# Patient Record
Sex: FEMALE | Race: WHITE | NOT HISPANIC OR LATINO | Employment: OTHER | URBAN - METROPOLITAN AREA
[De-identification: names, ages, dates, MRNs, and addresses within clinical notes are randomized per-mention and may not be internally consistent; named-entity substitution may affect disease eponyms.]

---

## 2017-02-20 RX ORDER — ROSUVASTATIN CALCIUM 20 MG/1
20 TABLET, COATED ORAL DAILY
Qty: 90 TABLET | Refills: 1 | Status: SHIPPED | OUTPATIENT
Start: 2017-02-20 | End: 2017-05-30 | Stop reason: SDUPTHER

## 2017-02-21 ENCOUNTER — ANESTHESIA (OUTPATIENT)
Dept: GASTROENTEROLOGY | Facility: HOSPITAL | Age: 77
End: 2017-02-21

## 2017-02-21 ENCOUNTER — ANESTHESIA EVENT (OUTPATIENT)
Dept: GASTROENTEROLOGY | Facility: HOSPITAL | Age: 77
End: 2017-02-21

## 2017-02-21 PROCEDURE — 25010000002 PROPOFOL 10 MG/ML EMULSION: Performed by: ANESTHESIOLOGY

## 2017-02-21 RX ORDER — PROPOFOL 10 MG/ML
VIAL (ML) INTRAVENOUS AS NEEDED
Status: DISCONTINUED | OUTPATIENT
Start: 2017-02-21 | End: 2017-02-21 | Stop reason: SURG

## 2017-02-21 RX ORDER — LIDOCAINE HYDROCHLORIDE 20 MG/ML
INJECTION, SOLUTION INFILTRATION; PERINEURAL AS NEEDED
Status: DISCONTINUED | OUTPATIENT
Start: 2017-02-21 | End: 2017-02-21 | Stop reason: SURG

## 2017-02-21 RX ADMIN — LIDOCAINE HYDROCHLORIDE 100 MG: 20 INJECTION, SOLUTION INFILTRATION; PERINEURAL at 11:29

## 2017-02-21 RX ADMIN — PROPOFOL 200 MG: 10 INJECTION, EMULSION INTRAVENOUS at 11:29

## 2017-02-21 NOTE — ANESTHESIA POSTPROCEDURE EVALUATION
Patient: Cesilia Lutz    Procedure Summary     Date Anesthesia Start Anesthesia Stop Room / Location    02/21/17 1121 1157  [facility]       Procedure Diagnosis Surgeon Provider    COLONOSCOPY TO TRANSVERSE COLON WITH COLD SNARE POLYPECTOMY (N/A ) Encounter for screening for malignant neoplasm of colon  (Encounter for screening for malignant neoplasm of colon [Z12.11]) MD Daysi Doshi MD          Anesthesia Type: MAC  Last vitals  /70 (02/21/17 1213)    Temp      Pulse 59 (02/21/17 1213)   Resp 12 (02/21/17 1213)    SpO2 94 % (02/21/17 1213)      Post Anesthesia Care and Evaluation    Patient location during evaluation: PACU  Patient participation: complete - patient participated  Level of consciousness: awake and alert  Pain score: 0  Pain management: adequate  Airway patency: patent  Anesthetic complications: No anesthetic complications  PONV Status: none  Cardiovascular status: acceptable  Respiratory status: acceptable  Hydration status: acceptable

## 2017-02-21 NOTE — ANESTHESIA PREPROCEDURE EVALUATION
Anesthesia Evaluation     Patient summary reviewed and Nursing notes reviewed   NPO Status: > 8 hours   Airway   Mallampati: II  TM distance: >3 FB  Neck ROM: limited  possible difficult intubation  Dental - normal exam     Pulmonary - normal exam   Cardiovascular - normal exam    Patient on routine beta blocker and Beta blocker not taken-may be given intraoperatively    (+) hypertension well controlled,       Neuro/Psych  GI/Hepatic/Renal/Endo      Musculoskeletal     Abdominal  - normal exam    Bowel sounds: normal.   Substance History      OB/GYN          Other                                    Anesthesia Plan    ASA 3     MAC     Anesthetic plan and risks discussed with patient.

## 2017-03-09 ENCOUNTER — OFFICE VISIT (OUTPATIENT)
Dept: INTERNAL MEDICINE | Facility: CLINIC | Age: 77
End: 2017-03-09

## 2017-03-09 VITALS
BODY MASS INDEX: 29.84 KG/M2 | TEMPERATURE: 97.3 F | RESPIRATION RATE: 16 BRPM | SYSTOLIC BLOOD PRESSURE: 120 MMHG | HEIGHT: 59 IN | WEIGHT: 148 LBS | DIASTOLIC BLOOD PRESSURE: 70 MMHG | HEART RATE: 68 BPM

## 2017-03-09 DIAGNOSIS — I49.3 PVC'S (PREMATURE VENTRICULAR CONTRACTIONS): ICD-10-CM

## 2017-03-09 DIAGNOSIS — I10 ESSENTIAL HYPERTENSION: Primary | ICD-10-CM

## 2017-03-09 DIAGNOSIS — E78.5 HYPERLIPIDEMIA, UNSPECIFIED HYPERLIPIDEMIA TYPE: ICD-10-CM

## 2017-03-09 DIAGNOSIS — R73.02 IMPAIRED GLUCOSE TOLERANCE: ICD-10-CM

## 2017-03-09 LAB
CHOLEST SERPL-MCNC: 185 MG/DL (ref 0–200)
HBA1C MFR BLD: 6 % (ref 4.8–5.6)
HDLC SERPL-MCNC: 48 MG/DL (ref 40–60)
LDLC SERPL CALC-MCNC: 114 MG/DL (ref 0–100)
TRIGL SERPL-MCNC: 117 MG/DL (ref 0–150)
VLDLC SERPL CALC-MCNC: 23.4 MG/DL (ref 5–40)

## 2017-03-09 PROCEDURE — 99214 OFFICE O/P EST MOD 30 MIN: CPT | Performed by: INTERNAL MEDICINE

## 2017-03-09 PROCEDURE — 93000 ELECTROCARDIOGRAM COMPLETE: CPT | Performed by: INTERNAL MEDICINE

## 2017-03-09 NOTE — PROGRESS NOTES
Subjective   Cesilia Lutz is a 76 y.o. female.     Chief Complaint   Patient presents with   • Hypertension   • Hyperlipidemia         HPI Comments: In for recheck of chronic IGT.    Hypertension   This is a chronic problem. The current episode started more than 1 year ago. The problem is unchanged. The problem is controlled. Pertinent negatives include no chest pain, headaches, orthopnea, palpitations, peripheral edema, PND or shortness of breath. There are no associated agents to hypertension. Past treatments include beta blockers and diuretics. The current treatment provides significant improvement. There are no compliance problems.  There is no history of angina, kidney disease, CAD/MI, CVA or heart failure.   Hyperlipidemia   This is a chronic problem. The current episode started more than 1 year ago. The problem is controlled. Recent lipid tests were reviewed and are normal. Factors aggravating her hyperlipidemia include thiazides and beta blockers. Pertinent negatives include no chest pain or shortness of breath. Current antihyperlipidemic treatment includes statins. The current treatment provides significant improvement of lipids. There are no compliance problems.  There are no known risk factors for coronary artery disease.        The following portions of the patient's history were reviewed and updated as appropriate: allergies, current medications, past social history and problem list.    Outpatient Prescriptions Marked as Taking for the 3/9/17 encounter (Office Visit) with Quinn Cabrera MD   Medication Sig Dispense Refill   • KLOR-CON 10 MEQ CR tablet TAKE 2 TABLETS DAILY 180 tablet 0   • metoprolol succinate XL (TOPROL-XL) 50 MG 24 hr tablet TAKE 1 TABLET DAILY 90 tablet 1   • rosuvastatin (CRESTOR) 20 MG tablet Take 1 tablet by mouth Daily. 90 tablet 1   • triamterene-hydrochlorothiazide (DYAZIDE) 37.5-25 MG per capsule TAKE 1 CAPSULE EVERY MORNING 90 capsule 0       Review of Systems    Constitutional: Negative for chills, fatigue and fever.   Respiratory: Negative for cough, shortness of breath and wheezing.    Cardiovascular: Negative for chest pain, palpitations, orthopnea, leg swelling and PND.   Gastrointestinal: Negative for abdominal pain, constipation, diarrhea, nausea and vomiting.   Neurological: Negative for headaches.       Objective   Vitals:    03/09/17 0919   BP: 120/70   Pulse: 68   Resp: 16   Temp: 97.3 °F (36.3 °C)      Last Weight    03/09/17 0919   Weight: 148 lb (67.1 kg)    [unfilled]  Body mass index is 29.89 kg/(m^2).      Physical Exam   Constitutional: She appears well-developed and well-nourished. No distress.   HENT:   Head: Normocephalic and atraumatic.   Neck: Carotid bruit is not present. No thyromegaly present.   Cardiovascular: Normal rate, normal heart sounds and intact distal pulses.  An irregular rhythm present. Frequent extrasystoles are present. Exam reveals no gallop.    No murmur heard.  Bigeminy on exam   Pulmonary/Chest: Effort normal and breath sounds normal. No respiratory distress. She has no wheezes. She has no rales.   Abdominal: Soft. Bowel sounds are normal. She exhibits mass. There is no tenderness. There is no guarding.       Large mass superior and to the left of the umbilicus that is firm and measures about 6-8 cm in diameter.  She states this was previously investigated and found to be a hernia.         Problem List Items Addressed This Visit        Cardiovascular and Mediastinum    Hypertension - Primary    Hyperlipidemia    Relevant Orders    Lipid Panel       Endocrine    Impaired glucose tolerance    Relevant Orders    Hemoglobin A1c      Other Visit Diagnoses     PVC's (premature ventricular contractions)        Relevant Orders    ECG 12 Lead        Assessment/Plan   In for recheck of hypertension, hyperlipidemia, and IGT.  Annual lab work done May 2016.  Gets glucose, A1c, and lipids every 4 months.  Feels great.  Tolerating  medications well.  Very active.  Walks regularly.  Getting ready to retire for the second time from her job at the [employer].  No changes are made today.  She is in bigeminy today.  We will check an EKG.      ECG 12 Lead  Date/Time: 3/9/2017 1:10 PM  Performed by: FRANCO SILVA  Authorized by: FRANCO SILVA   Comparison: compared with previous ECG from 10/23/2012  Similar to previous ECG  Rhythm: sinus rhythm  Rate: normal  Conduction: conduction normal  ST Segments: ST segments normal  T Waves: T waves normal  QRS axis: normal  Other: no other findings  Clinical impression: normal ECG  Comments: Normal EKG.  Unchanged from prior EKG dated 10/23/12                   Dragon disclaimer:   Much of this encounter note is an electronic transcription/translation of spoken language to printed text. The electronic translation of spoken language may permit erroneous, or at times, nonsensical words or phrases to be inadvertently transcribed; Although I have reviewed the note for such errors, some may still exist.

## 2017-03-21 RX ORDER — POTASSIUM CHLORIDE 750 MG/1
TABLET, EXTENDED RELEASE ORAL
Qty: 180 TABLET | Refills: 1 | Status: SHIPPED | OUTPATIENT
Start: 2017-03-21 | End: 2017-10-25 | Stop reason: SDUPTHER

## 2017-03-21 RX ORDER — TRIAMTERENE AND HYDROCHLOROTHIAZIDE 37.5; 25 MG/1; MG/1
CAPSULE ORAL
Qty: 90 CAPSULE | Refills: 1 | Status: SHIPPED | OUTPATIENT
Start: 2017-03-21 | End: 2017-09-28 | Stop reason: SDUPTHER

## 2017-05-30 RX ORDER — ROSUVASTATIN CALCIUM 20 MG/1
20 TABLET, COATED ORAL DAILY
Qty: 90 TABLET | Refills: 0 | Status: SHIPPED | OUTPATIENT
Start: 2017-05-30 | End: 2017-08-21

## 2017-06-05 ENCOUNTER — TELEPHONE (OUTPATIENT)
Dept: INTERNAL MEDICINE | Facility: CLINIC | Age: 77
End: 2017-06-05

## 2017-06-05 RX ORDER — ROSUVASTATIN CALCIUM 20 MG/1
20 TABLET, COATED ORAL DAILY
Qty: 90 TABLET | Refills: 1 | Status: SHIPPED | OUTPATIENT
Start: 2017-06-05 | End: 2017-08-21 | Stop reason: SDUPTHER

## 2017-06-26 RX ORDER — METOPROLOL SUCCINATE 50 MG/1
TABLET, EXTENDED RELEASE ORAL
Qty: 90 TABLET | Refills: 0 | Status: SHIPPED | OUTPATIENT
Start: 2017-06-26 | End: 2017-10-12 | Stop reason: SDUPTHER

## 2017-08-21 ENCOUNTER — OFFICE VISIT (OUTPATIENT)
Dept: INTERNAL MEDICINE | Facility: CLINIC | Age: 77
End: 2017-08-21

## 2017-08-21 VITALS
TEMPERATURE: 97.5 F | RESPIRATION RATE: 16 BRPM | BODY MASS INDEX: 29.56 KG/M2 | WEIGHT: 146.6 LBS | OXYGEN SATURATION: 93 % | SYSTOLIC BLOOD PRESSURE: 118 MMHG | HEIGHT: 59 IN | HEART RATE: 67 BPM | DIASTOLIC BLOOD PRESSURE: 78 MMHG

## 2017-08-21 DIAGNOSIS — Z23 NEED FOR VACCINATION: ICD-10-CM

## 2017-08-21 DIAGNOSIS — R73.02 IMPAIRED GLUCOSE TOLERANCE: Primary | ICD-10-CM

## 2017-08-21 DIAGNOSIS — E78.5 HYPERLIPIDEMIA, UNSPECIFIED HYPERLIPIDEMIA TYPE: ICD-10-CM

## 2017-08-21 DIAGNOSIS — Z79.899 MEDICATION MANAGEMENT: ICD-10-CM

## 2017-08-21 DIAGNOSIS — I10 ESSENTIAL HYPERTENSION: ICD-10-CM

## 2017-08-21 LAB
ALBUMIN SERPL-MCNC: 4.3 G/DL (ref 3.5–5.2)
ALBUMIN/GLOB SERPL: 1.8 G/DL
ALP SERPL-CCNC: 50 U/L (ref 39–117)
ALT SERPL-CCNC: 17 U/L (ref 1–33)
AST SERPL-CCNC: 17 U/L (ref 1–32)
BASOPHILS # BLD AUTO: 0.06 10*3/MM3 (ref 0–0.2)
BASOPHILS NFR BLD AUTO: 1 % (ref 0–1.5)
BILIRUB SERPL-MCNC: 1.1 MG/DL (ref 0.1–1.2)
BUN SERPL-MCNC: 19 MG/DL (ref 8–23)
BUN/CREAT SERPL: 20 (ref 7–25)
CALCIUM SERPL-MCNC: 9.8 MG/DL (ref 8.6–10.5)
CHLORIDE SERPL-SCNC: 102 MMOL/L (ref 98–107)
CHOLEST SERPL-MCNC: 168 MG/DL (ref 0–200)
CO2 SERPL-SCNC: 22.4 MMOL/L (ref 22–29)
CREAT SERPL-MCNC: 0.95 MG/DL (ref 0.57–1)
EOSINOPHIL # BLD AUTO: 0.32 10*3/MM3 (ref 0–0.7)
EOSINOPHIL NFR BLD AUTO: 5.4 % (ref 0.3–6.2)
ERYTHROCYTE [DISTWIDTH] IN BLOOD BY AUTOMATED COUNT: 13.5 % (ref 11.7–13)
GLOBULIN SER CALC-MCNC: 2.4 GM/DL
GLUCOSE BLDC GLUCOMTR-MCNC: 131 MG/DL (ref 70–130)
GLUCOSE SERPL-MCNC: 117 MG/DL (ref 65–99)
HBA1C MFR BLD: 6.3 % (ref 4.8–5.6)
HCT VFR BLD AUTO: 42.5 % (ref 35.6–45.5)
HDLC SERPL-MCNC: 42 MG/DL (ref 40–60)
HGB BLD-MCNC: 13.7 G/DL (ref 11.9–15.5)
IMM GRANULOCYTES # BLD: 0 10*3/MM3 (ref 0–0.03)
IMM GRANULOCYTES NFR BLD: 0 % (ref 0–0.5)
LDLC SERPL CALC-MCNC: 99 MG/DL (ref 0–100)
LYMPHOCYTES # BLD AUTO: 1.28 10*3/MM3 (ref 0.9–4.8)
LYMPHOCYTES NFR BLD AUTO: 21.7 % (ref 19.6–45.3)
MCH RBC QN AUTO: 31.9 PG (ref 26.9–32)
MCHC RBC AUTO-ENTMCNC: 32.2 G/DL (ref 32.4–36.3)
MCV RBC AUTO: 99.1 FL (ref 80.5–98.2)
MONOCYTES # BLD AUTO: 0.58 10*3/MM3 (ref 0.2–1.2)
MONOCYTES NFR BLD AUTO: 9.8 % (ref 5–12)
NEUTROPHILS # BLD AUTO: 3.67 10*3/MM3 (ref 1.9–8.1)
NEUTROPHILS NFR BLD AUTO: 62.1 % (ref 42.7–76)
PLATELET # BLD AUTO: 304 10*3/MM3 (ref 140–500)
POTASSIUM SERPL-SCNC: 4.3 MMOL/L (ref 3.5–5.2)
PROT SERPL-MCNC: 6.7 G/DL (ref 6–8.5)
RBC # BLD AUTO: 4.29 10*6/MM3 (ref 3.9–5.2)
SODIUM SERPL-SCNC: 139 MMOL/L (ref 136–145)
TRIGL SERPL-MCNC: 133 MG/DL (ref 0–150)
VLDLC SERPL CALC-MCNC: 26.6 MG/DL (ref 5–40)
WBC # BLD AUTO: 5.91 10*3/MM3 (ref 4.5–10.7)

## 2017-08-21 PROCEDURE — 99214 OFFICE O/P EST MOD 30 MIN: CPT | Performed by: INTERNAL MEDICINE

## 2017-08-21 PROCEDURE — G0009 ADMIN PNEUMOCOCCAL VACCINE: HCPCS | Performed by: INTERNAL MEDICINE

## 2017-08-21 PROCEDURE — 90670 PCV13 VACCINE IM: CPT | Performed by: INTERNAL MEDICINE

## 2017-08-21 PROCEDURE — G0439 PPPS, SUBSEQ VISIT: HCPCS | Performed by: INTERNAL MEDICINE

## 2017-08-21 PROCEDURE — 82962 GLUCOSE BLOOD TEST: CPT | Performed by: INTERNAL MEDICINE

## 2017-08-21 RX ORDER — ROSUVASTATIN CALCIUM 20 MG/1
20 TABLET, COATED ORAL DAILY
Qty: 90 TABLET | Refills: 1 | Status: SHIPPED | OUTPATIENT
Start: 2017-08-21 | End: 2018-06-20 | Stop reason: SDUPTHER

## 2017-08-21 NOTE — PROGRESS NOTES
QUICK REFERENCE INFORMATION:  The ABCs of the Annual Wellness Visit    Subsequent Medicare Wellness Visit    HEALTH RISK ASSESSMENT    1940    Recent Hospitalizations:  No hospitalization(s) within the last year..        Current Medical Providers:  Patient Care Team:  Quinn Cabrera MD as PCP - Internal Medicine (Internal Medicine)        Smoking Status:  History   Smoking Status   • Never Smoker   Smokeless Tobacco   • Not on file       Alcohol Consumption:  History   Alcohol Use No       Depression Screen:   PHQ-9 Depression Screening 8/21/2017   Little interest or pleasure in doing things 0   Feeling down, depressed, or hopeless 0   Trouble falling or staying asleep, or sleeping too much 0   Feeling tired or having little energy 0   Poor appetite or overeating 0   Feeling bad about yourself - or that you are a failure or have let yourself or your family down 0   Trouble concentrating on things, such as reading the newspaper or watching television 0   Moving or speaking so slowly that other people could have noticed. Or the opposite - being so fidgety or restless that you have been moving around a lot more than usual 0   Thoughts that you would be better off dead, or of hurting yourself in some way 0   PHQ-9 Total Score 0   If you checked off any problems, how difficult have these problems made it for you to do your work, take care of things at home, or get along with other people? Not difficult at all       Health Habits and Functional and Cognitive Screening:  Functional & Cognitive Status 8/21/2017   Do you have difficulty preparing food and eating? No   Do you have difficulty bathing yourself? No   Do you have difficulty getting dressed? No   Do you have difficulty using the toilet? No   Do you have difficulty moving around from place to place? No   In the past year have you fallen or experienced a near fall? No   Do you need help using the phone?  No   Are you deaf or do you have serious difficulty  hearing?  No   Do you need help with transportation? No   Do you need help shopping? No   Do you need help preparing meals?  No   Do you need help with housework?  No   Do you need help with laundry? No   Do you need help taking your medications? No   Do you need help managing money? No   Do you have difficulty concentrating, remembering or making decisions? No       Health Habits  Current Diet: Well Balanced Diet  Dental Exam: Up to date  Eye Exam: Up to date  Exercise (times per week): 1 times per week  Current Exercise Activities Include: Walking      Does the patient have evidence of cognitive impairment? No    Aspirin use counseling: Does not need ASA (and currently is not on it)      Recent Lab Results:  CMP:     Lipid Panel:  Lab Results   Component Value Date    TRIG 117 03/09/2017    HDL 48 03/09/2017    VLDL 23.4 03/09/2017     HbA1c:  Lab Results   Component Value Date    HGBA1C 6.00 (H) 03/09/2017       Visual Acuity:  No exam data present    Age-appropriate Screening Schedule:  Refer to the list below for future screening recommendations based on patient's age, sex and/or medical conditions. Orders for these recommended tests are listed in the plan section. The patient has been provided with a written plan.    Health Maintenance   Topic Date Due   • TDAP/TD VACCINES (1 - Tdap) 04/21/2008   • PNEUMOCOCCAL VACCINES (65+ LOW/MEDIUM RISK) (2 of 2 - PCV13) 05/03/2015   • INFLUENZA VACCINE  09/01/2017   • LIPID PANEL  03/09/2018   • MAMMOGRAM  05/01/2018   • PAP SMEAR  05/01/2019   • COLONOSCOPY  02/21/2027   • DXA SCAN  01/01/2029   • ZOSTER VACCINE  Excluded        Subjective   History of Present Illness    Cesilia Lutz is a 77 y.o. female who presents for an Subsequent Wellness Visit.    The following portions of the patient's history were reviewed and updated as appropriate: allergies, current medications, past family history, past medical history, past social history, past surgical history and problem  "list.    Outpatient Medications Prior to Visit   Medication Sig Dispense Refill   • KLOR-CON 10 MEQ CR tablet TAKE 2 TABLETS DAILY 180 tablet 1   • metoprolol succinate XL (TOPROL-XL) 50 MG 24 hr tablet TAKE 1 TABLET DAILY 90 tablet 0   • triamterene-hydrochlorothiazide (DYAZIDE) 37.5-25 MG per capsule TAKE 1 CAPSULE EVERY MORNING 90 capsule 1   • rosuvastatin (CRESTOR) 20 MG tablet Take 1 tablet by mouth Daily. 90 tablet 1   • rosuvastatin (CRESTOR) 20 MG tablet Take 1 tablet by mouth Daily for 90 days. 90 tablet 0     No facility-administered medications prior to visit.        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 Have asked the patient to send this to us to add to record.    Identification of Risk Factors:  Risk factors include: weight , unhealthy diet, inactivity and incontinence.    Review of Systems    Compared to one year ago, the patient feels her physical health is the same.  Compared to one year ago, the patient feels her mental health is the same.    Objective     Physical Exam    Vitals:    08/21/17 1103   BP: 118/78   BP Location: Left arm   Patient Position: Sitting   Cuff Size: Large Adult   Pulse: 67   Resp: 16   Temp: 97.5 °F (36.4 °C)   TempSrc: Oral   SpO2: 93%   Weight: 146 lb 9.6 oz (66.5 kg)   Height: 59\" (149.9 cm)   PainSc: 0-No pain       Body mass index is 29.61 kg/(m^2).  Discussed the patient's BMI with her. The BMI is in the acceptable range.    Assessment/Plan   Patient Self-Management and Personalized Health Advice  The patient has been provided with information about: diet, exercise, fall prevention and designing advance directives and preventive services including:   · Advance directive, Exercise counseling " provided, Nutrition counseling provided, Pneumococcal vaccine .    Visit Diagnoses:    ICD-10-CM ICD-9-CM   1. Impaired glucose tolerance R73.02 790.22   2. Essential hypertension I10 401.9   3. Hyperlipidemia, unspecified hyperlipidemia type E78.5 272.4   4. Medication management Z79.899 V58.69       Orders Placed This Encounter   Procedures   • Comprehensive Metabolic Panel   • Lipid Panel   • Hemoglobin A1c   • POCT Glucose   • CBC & Differential     Order Specific Question:   Manual Differential     Answer:   No       Outpatient Encounter Prescriptions as of 8/21/2017   Medication Sig Dispense Refill   • KLOR-CON 10 MEQ CR tablet TAKE 2 TABLETS DAILY 180 tablet 1   • metoprolol succinate XL (TOPROL-XL) 50 MG 24 hr tablet TAKE 1 TABLET DAILY 90 tablet 0   • rosuvastatin (CRESTOR) 20 MG tablet Take 1 tablet by mouth Daily. 90 tablet 1   • triamterene-hydrochlorothiazide (DYAZIDE) 37.5-25 MG per capsule TAKE 1 CAPSULE EVERY MORNING 90 capsule 1   • [DISCONTINUED] rosuvastatin (CRESTOR) 20 MG tablet Take 1 tablet by mouth Daily. 90 tablet 1   • [DISCONTINUED] rosuvastatin (CRESTOR) 20 MG tablet Take 1 tablet by mouth Daily for 90 days. 90 tablet 0     No facility-administered encounter medications on file as of 8/21/2017.        Reviewed use of high risk medication in the elderly: yes  Reviewed for potential of harmful drug interactions in the elderly: yes    Follow Up:  Return in about 4 months (around 12/21/2017) for Recheck htn, lipids, igt.     An After Visit Summary and PPPS with all of these plans were given to the patient.

## 2017-08-21 NOTE — PROGRESS NOTES
Subjective   Cesilia Lutz is a 77 y.o. female.     Chief Complaint   Patient presents with   • Hyperlipidemia     IGT   • Hypertension         HPI Comments: In for recheck of chronic IGT.    Hyperlipidemia   This is a chronic problem. The current episode started more than 1 year ago. The problem is controlled. Recent lipid tests were reviewed and are normal. Factors aggravating her hyperlipidemia include thiazides and beta blockers. Pertinent negatives include no chest pain or shortness of breath. Current antihyperlipidemic treatment includes statins. The current treatment provides significant improvement of lipids. There are no compliance problems.  There are no known risk factors for coronary artery disease.   Hypertension   This is a chronic problem. The current episode started more than 1 year ago. The problem is unchanged. The problem is controlled. Pertinent negatives include no chest pain, headaches, orthopnea, palpitations, peripheral edema, PND or shortness of breath. There are no associated agents to hypertension. Past treatments include beta blockers and diuretics. The current treatment provides significant improvement. There are no compliance problems.  There is no history of angina, kidney disease, CAD/MI, CVA or heart failure.        The following portions of the patient's history were reviewed and updated as appropriate: allergies, current medications, past social history and problem list.    Outpatient Prescriptions Marked as Taking for the 8/21/17 encounter (Office Visit) with Quinn Cabrera MD   Medication Sig Dispense Refill   • KLOR-CON 10 MEQ CR tablet TAKE 2 TABLETS DAILY 180 tablet 1   • metoprolol succinate XL (TOPROL-XL) 50 MG 24 hr tablet TAKE 1 TABLET DAILY 90 tablet 0   • rosuvastatin (CRESTOR) 20 MG tablet Take 1 tablet by mouth Daily. 90 tablet 1   • triamterene-hydrochlorothiazide (DYAZIDE) 37.5-25 MG per capsule TAKE 1 CAPSULE EVERY MORNING 90 capsule 1       Review of Systems    Constitutional: Negative for chills, fatigue and fever.   Respiratory: Negative for cough, shortness of breath and wheezing.    Cardiovascular: Negative for chest pain, palpitations, orthopnea, leg swelling and PND.   Gastrointestinal: Negative for abdominal pain, constipation, diarrhea, nausea and vomiting.   Neurological: Negative for headaches.       Objective   Vitals:    08/21/17 1103   BP: 118/78   Pulse: 67   Resp: 16   Temp: 97.5 °F (36.4 °C)   SpO2: 93%      Last Weight    08/21/17  1103   Weight: 146 lb 9.6 oz (66.5 kg)    [unfilled]  Body mass index is 29.61 kg/(m^2).      Physical Exam   Constitutional: She appears well-developed and well-nourished. No distress.   HENT:   Head: Normocephalic and atraumatic.   Neck: Carotid bruit is not present. No thyromegaly present.   Cardiovascular: Normal rate, normal heart sounds and intact distal pulses.  An irregular rhythm present. Frequent extrasystoles are present. Exam reveals no gallop.    No murmur heard.  Bigeminy on exam   Pulmonary/Chest: Effort normal and breath sounds normal. No respiratory distress. She has no wheezes. She has no rales.   Abdominal: Soft. Bowel sounds are normal. She exhibits mass. There is no tenderness. There is no guarding.       Large mass superior and to the left of the umbilicus that is firm and measures about 6-8 cm in diameter.  She states this was previously investigated and found to be a hernia.         Problem List Items Addressed This Visit        Cardiovascular and Mediastinum    Hypertension    Hyperlipidemia       Endocrine    Impaired glucose tolerance - Primary    Relevant Orders    POCT Glucose (Completed)        Assessment/Plan   In for recheck of hypertension, hyperlipidemia, and IGT.  Annual lab work done today August 2017.  Gets glucose, A1c, and lipids every 4 months.  Feels great.  Tolerating medications well.  Very active.  Walks regularly.  No changes are made today.  She is in bigeminy today.  Due for  Prevnar.  Due for annual wellness today.  NIDHI FARR in April 2018.  AWV today.         Dragon disclaimer:   Much of this encounter note is an electronic transcription/translation of spoken language to printed text. The electronic translation of spoken language may permit erroneous, or at times, nonsensical words or phrases to be inadvertently transcribed; Although I have reviewed the note for such errors, some may still exist.

## 2017-08-21 NOTE — PATIENT INSTRUCTIONS
Pneumococcal Conjugate Vaccine (PCV13)   1. Why get vaccinated?  Vaccination can protect both children and adults from pneumococcal disease.  Pneumococcal disease is caused by bacteria that can spread from person to person through close contact. It can cause ear infections, and it can also lead to more serious infections of the:  · Lungs (pneumonia),  · Blood (bacteremia), and  · Covering of the brain and spinal cord (meningitis).  Pneumococcal pneumonia is most common among adults. Pneumococcal meningitis can cause deafness and brain damage, and it kills about 1 child in 10 who get it.  Anyone can get pneumococcal disease, but children under 2 years of age and adults 65 years and older, people with certain medical conditions, and cigarette smokers are at the highest risk.  Before there was a vaccine, the [place] saw:  · more than 700 cases of meningitis,  · about 13,000 blood infections,  · about 5 million ear infections, and  · about 200 deaths  in children under 5 each year from pneumococcal disease. Since vaccine became available, severe pneumococcal disease in these children has fallen by 88%.  About 18,000 older adults die of pneumococcal disease each year in the [place].  Treatment of pneumococcal infections with penicillin and other drugs is not as effective as it used to be, because some strains of the disease have become resistant to these drugs. This makes prevention of the disease, through vaccination, even more important.  2. PCV13 vaccine  Pneumococcal conjugate vaccine (called PCV13) protects against 13 types of pneumococcal bacteria.  PCV13 is routinely given to children at 2, 4, 6, and 12-15 months of age. It is also recommended for children and adults 2 to 64 years of age with certain health conditions, and for all adults 65 years of age and older. Your doctor can give you details.  3. Some people should not get this vaccine  Anyone who has ever had a life-threatening allergic reaction  to a dose of this vaccine, to an earlier pneumococcal vaccine called PCV7, or to any vaccine containing diphtheria toxoid (for example, DTaP), should not get PCV13.  Anyone with a severe allergy to any component of PCV13 should not get the vaccine. Tell your doctor if the person being vaccinated has any severe allergies.  If the person scheduled for vaccination is not feeling well, your healthcare provider might decide to reschedule the shot on another day.  4. Risks of a vaccine reaction  With any medicine, including vaccines, there is a chance of reactions. These are usually mild and go away on their own, but serious reactions are also possible.  Problems reported following PCV13 varied by age and dose in the series. The most common problems reported among children were:  · About half became drowsy after the shot, had a temporary loss of appetite, or had redness or tenderness where the shot was given.  · About 1 out of 3 had swelling where the shot was given.  · About 1 out of 3 had a mild fever, and about 1 in 20 had a fever over 102.2°F.  · Up to about 8 out of 10 became fussy or irritable.  Adults have reported pain, redness, and swelling where the shot was given; also mild fever, fatigue, headache, chills, or muscle pain.  Young children who get PCV13 along with inactivated flu vaccine at the same time may be at increased risk for seizures caused by fever. Ask your doctor for more information.  Problems that could happen after any vaccine:  · People sometimes faint after a medical procedure, including vaccination. Sitting or lying down for about 15 minutes can help prevent fainting, and injuries caused by a fall. Tell your doctor if you feel dizzy, or have vision changes or ringing in the ears.  · Some older children and adults get severe pain in the shoulder and have difficulty moving the arm where a shot was given. This happens very rarely.  · Any medication can cause a severe allergic reaction. Such  reactions from a vaccine are very rare, estimated at about 1 in a million doses, and would happen within a few minutes to a few hours after the vaccination.  As with any medicine, there is a very small chance of a vaccine causing a serious injury or death.  The safety of vaccines is always being monitored. For more information, visit: www.cdc.gov/vaccinesafety/  5. What if there is a serious reaction?  What should I look for?  · Look for anything that concerns you, such as signs of a severe allergic reaction, very high fever, or unusual behavior.  Signs of a severe allergic reaction can include hives, swelling of the face and throat, difficulty breathing, a fast heartbeat, dizziness, and weakness-usually within a few minutes to a few hours after the vaccination.  What should I do?  · If you think it is a severe allergic reaction or other emergency that can't wait, call 9-1-1 or get the person to the nearest hospital. Otherwise, call your doctor.  Reactions should be reported to the Vaccine Adverse Event Reporting System (VAERS). Your doctor should file this report, or you can do it yourself through the VAERS web site at www.vaers.[facility].gov, or by calling 1-162.939.1873.  VAERS does not give medical advice.  6. The National Vaccine Injury Compensation Program  The National Vaccine Injury Compensation Program (VICP) is a federal program that was created to compensate people who may have been injured by certain vaccines.  Persons who believe they may have been injured by a vaccine can learn about the program and about filing a claim by calling 1-309.711.9637 or visiting the VICP website at www.hrsa.gov/vaccinecompensation. There is a time limit to file a claim for compensation.  7. How can I learn more?  · Ask your healthcare provider. He or she can give you the vaccine package insert or suggest other sources of information.  · Call your local or state health department.  · Contact the Centers for Disease Control and  Prevention (CDC):    Call 1-724.476.2929 (1-800-CDC-INFO) or    Visit CDC's website at www.cdc.gov/vaccines  Vaccine Information Statement  PCV13 Vaccine (11/05/2015)     This information is not intended to replace advice given to you by your health care provider. Make sure you discuss any questions you have with your health care provider.     Document Released: 10/15/2007 Document Revised: 01/08/2016 Document Reviewed: 11/12/2015  [employer] Interactive Patient Education ©2017 [employer]  Urinary Incontinence  Urinary incontinence is the involuntary loss of urine from your bladder.  CAUSES   There are many causes of urinary incontinence. They include:  · Medicines.  · Infections.  · Prostatic enlargement, leading to overflow of urine from your bladder.  · Surgery.  · Neurological diseases.  · Emotional factors.  SIGNS AND SYMPTOMS  Urinary Incontinence can be divided into four types:  1. Urge incontinence. Urge incontinence is the involuntary loss of urine before you have the opportunity to go to the bathroom. There is a sudden urge to void but not enough time to reach a bathroom.  2. Stress incontinence. Stress incontinence is the sudden loss of urine with any activity that forces urine to pass. It is commonly caused by anatomical changes to the pelvis and sphincter areas of your body.  3. Overflow incontinence. Overflow incontinence is the loss of urine from an obstructed opening to your bladder. This results in a backup of urine and a resultant buildup of pressure within the bladder. When the pressure within the bladder exceeds the closing pressure of the sphincter, the urine overflows, which causes incontinence, similar to water overflowing a dam.  4. Total incontinence. Total incontinence is the loss of urine as a result of the inability to store urine within your bladder.  DIAGNOSIS   Evaluating the cause of incontinence may require:  · A thorough and complete medical and obstetric history.  · A complete  physical exam.  · Laboratory tests such as a urine culture and sensitivities.  When additional tests are indicated, they can include:  · An ultrasound exam.  · Kidney and bladder X-rays.  · Cystoscopy. This is an exam of the bladder using a narrow scope.  · Urodynamic testing to test the nerve function to the bladder and sphincter areas.  TREATMENT   Treatment for urinary incontinence depends on the cause:  · For urge incontinence caused by a bacterial infection, antibiotics will be prescribed. If the urge incontinence is related to medicines you take, your health care provider may have you change the medicine.  · For stress incontinence, surgery to re-establish anatomical support to the bladder or sphincter, or both, will often correct the condition.  · For overflow incontinence caused by an enlarged prostate, an operation to open the channel through the enlarged prostate will allow the flow of urine out of the bladder. In women with fibroids, a hysterectomy may be recommended.  · For total incontinence, surgery on your urinary sphincter may help. An artificial urinary sphincter (an inflatable cuff placed around the urethra) may be required. In women who have developed a hole-like passage between their bladder and vagina (vesicovaginal fistula), surgery to close the fistula often is required.  HOME CARE INSTRUCTIONS  · Normal daily hygiene and the use of pads or adult diapers that are changed regularly will help prevent odors and skin damage.  · Avoid caffeine. It can overstimulate your bladder.  · Use the bathroom regularly. Try about every 2-3 hours to go to the bathroom, even if you do not feel the need to do so. Take time to empty your bladder completely. After urinating, wait a minute. Then try to urinate again.  · For causes involving nerve dysfunction, keep a log of the medicines you take and a journal of the times you go to the bathroom.  SEEK MEDICAL CARE IF:  · You experience worsening of pain instead of  improvement in pain after your procedure.  · Your incontinence becomes worse instead of better.  SEE IMMEDIATE MEDICAL CARE IF:  · You experience fever or shaking chills.  · You are unable to pass your urine.  · You have redness spreading into your groin or down into your thighs.  MAKE SURE YOU:  · ?Understand these instructions.    · Will watch your condition.  · Will get help right away if you are not doing well or get worse.     This information is not intended to replace advice given to you by your health care provider. Make sure you discuss any questions you have with your health care provider.     Document Released: 01/25/2006 Document Revised: 01/08/2016 Document Reviewed: 05/27/2014  [email or web address] Interactive Patient Education ©2017 [employer] Inc.  Fat and Cholesterol Restricted Diet  High levels of fat and cholesterol in your blood may lead to various health problems, such as diseases of the heart, blood vessels, gallbladder, liver, and pancreas. Fats are concentrated sources of energy that come in various forms. Certain types of fat, including saturated fat, may be harmful in excess. Cholesterol is a substance needed by your body in small amounts. Your body makes all the cholesterol it needs. Excess cholesterol comes from the food you eat.  When you have high levels of cholesterol and saturated fat in your blood, health problems can develop because the excess fat and cholesterol will gather along the walls of your blood vessels, causing them to narrow. Choosing the right foods will help you control your intake of fat and cholesterol. This will help keep the levels of these substances in your blood within normal limits and reduce your risk of disease.  WHAT IS MY PLAN?  Your health care provider recommends that you:  · Get no more than __________ % of the total calories in your daily diet from fat.  · Limit your intake of saturated fat to less than ______% of your total calories each day.  · Limit the amount of  "cholesterol in your diet to less than _________mg per day.  WHAT TYPES OF FAT SHOULD I CHOOSE?  · Choose healthy fats more often. Choose monounsaturated and polyunsaturated fats, such as olive and canola oil, flaxseeds, walnuts, almonds, and seeds.  · Eat more omega-3 fats. Good choices include salmon, mackerel, sardines, tuna, flaxseed oil, and ground flaxseeds. Aim to eat fish at least two times a week.  · Limit saturated fats. Saturated fats are primarily found in animal products, such as meats, butter, and cream. Plant sources of saturated fats include palm oil, palm kernel oil, and coconut oil.  · Avoid foods with partially hydrogenated oils in them. These contain trans fats. Examples of foods that contain trans fats are stick margarine, some tub margarines, cookies, crackers, and other baked goods.  WHAT GENERAL GUIDELINES DO I NEED TO FOLLOW?  These guidelines for healthy eating will help you control your intake of fat and cholesterol:  · Check food labels carefully to identify foods with trans fats or high amounts of saturated fat.  · Fill one half of your plate with vegetables and green salads.  · Fill one fourth of your plate with whole grains. Look for the word \"whole\" as the first word in the ingredient list.  · Fill one fourth of your plate with lean protein foods.  · Limit fruit to two servings a day. Choose fruit instead of juice.  · Eat more foods that contain soluble fiber. Examples of foods that contain this type of fiber are apples, broccoli, carrots, beans, peas, and barley. Aim to get 20-30 g of fiber per day.  · Eat more home-cooked food and less restaurant, buffet, and fast food.  · Limit or avoid alcohol.  · Limit foods high in starch and sugar.  · Limit fried foods.  · Cook foods using methods other than frying. Baking, boiling, grilling, and broiling are all great options.  · Lose weight if you are overweight. Losing just 5-10% of your initial body weight can help your overall health and " prevent diseases such as diabetes and heart disease.  WHAT FOODS CAN I EAT?  Grains  Whole grains, such as whole wheat or whole grain breads, crackers, cereals, and pasta. Unsweetened oatmeal, bulgur, barley, quinoa, or brown rice. Corn or whole wheat flour tortillas.  Vegetables  Fresh or frozen vegetables (raw, steamed, roasted, or grilled). Green salads.  Fruits  All fresh, canned (in natural juice), or frozen fruits.  Meat and Other Protein Products  Ground beef (85% or leaner), grass-fed beef, or beef trimmed of fat. Skinless chicken or turkey. Ground chicken or turkey. Pork trimmed of fat. All fish and seafood. Eggs. Dried beans, peas, or lentils. Unsalted nuts or seeds. Unsalted canned or dry beans.  Dairy  Low-fat dairy products, such as skim or 1% milk, 2% or reduced-fat cheeses, low-fat ricotta or cottage cheese, or plain low-fat yogurt.  Fats and Oils  Tub margarines without trans fats. Light or reduced-fat mayonnaise and salad dressings. Avocado. Olive, canola, sesame, or safflower oils. Natural peanut or almond butter (choose ones without added sugar and oil).  The items listed above may not be a complete list of recommended foods or beverages. Contact your dietitian for more options.  WHAT FOODS ARE NOT RECOMMENDED?  Grains  White bread. White pasta. White rice. Cornbread. Bagels, pastries, and croissants. Crackers that contain trans fat.  Vegetables  White potatoes. Corn. Creamed or fried vegetables. Vegetables in a cheese sauce.  Fruits  Dried fruits. Canned fruit in light or heavy syrup. Fruit juice.  Meat and Other Protein Products  Fatty cuts of meat. Ribs, chicken wings, kennedy, sausage, bologna, salami, chitterlings, fatback, hot dogs, bratwurst, and packaged luncheon meats. Liver and organ meats.  Dairy  Whole or 2% milk, cream, half-and-half, and cream cheese. Whole milk cheeses. Whole-fat or sweetened yogurt. Full-fat cheeses. Nondairy creamers and whipped toppings. Processed cheese, cheese  spreads, or cheese curds.  Sweets and Desserts  Corn syrup, sugars, honey, and molasses. Candy. Jam and jelly. Syrup. Sweetened cereals. Cookies, pies, cakes, donuts, muffins, and ice cream.  Fats and Oils  Butter, stick margarine, lard, shortening, ghee, or kennedy fat. Coconut, palm kernel, or palm oils.  Beverages  Alcohol. Sweetened drinks (such as sodas, lemonade, and fruit drinks or punches).  The items listed above may not be a complete list of foods and beverages to avoid. Contact your dietitian for more information.     This information is not intended to replace advice given to you by your health care provider. Make sure you discuss any questions you have with your health care provider.     Document Released: 12/18/2006 Document Revised: 01/08/2016 Document Reviewed: 03/18/2015  [employer] Interactive Patient Education ©2017 [employer]  Fall Prevention in the Home   Falls can cause injuries and can affect people from all age groups. There are many simple things that you can do to make your home safe and to help prevent falls.  WHAT CAN I DO ON THE OUTSIDE OF MY HOME?  · Regularly repair the edges of walkways and driveways and fix any cracks.  · Remove high doorway thresholds.  · Trim any shrubbery on the main path into your home.  · Use bright outdoor lighting.  · Clear walkways of debris and clutter, including tools and rocks.  · Regularly check that handrails are securely fastened and in good repair. Both sides of any steps should have handrails.  · Install guardrails along the edges of any raised decks or porches.  · Have leaves, snow, and ice cleared regularly.  · Use sand or salt on walkways during winter months.  · In the garage, clean up any spills right away, including grease or oil spills.  WHAT CAN I DO IN THE BATHROOM?  · Use night lights.  · Install grab bars by the toilet and in the tub and shower. Do not use towel bars as grab bars.  · Use non-skid mats or decals on the floor of the tub or  shower.  · If you need to sit down while you are in the shower, use a plastic, non-slip stool.  · Keep the floor dry. Immediately clean up any water that spills on the floor.  · Remove soap buildup in the tub or shower on a regular basis.  · Attach bath mats securely with double-sided non-slip rug tape.  · Remove throw rugs and other tripping hazards from the floor.  WHAT CAN I DO IN THE BEDROOM?  · Use night lights.  · Make sure that a bedside light is easy to reach.  · Do not use oversized bedding that drapes onto the floor.  · Have a firm chair that has side arms to use for getting dressed.  · Remove throw rugs and other tripping hazards from the floor.  WHAT CAN I DO IN THE KITCHEN?   · Clean up any spills right away.  · Avoid walking on wet floors.  · Place frequently used items in easy-to-reach places.  · If you need to reach for something above you, use a sturdy step stool that has a grab bar.  · Keep electrical cables out of the way.  · Do not use floor polish or wax that makes floors slippery. If you have to use wax, make sure that it is non-skid floor wax.  · Remove throw rugs and other tripping hazards from the floor.  WHAT CAN I DO IN THE STAIRWAYS?  · Do not leave any items on the stairs.  · Make sure that there are handrails on both sides of the stairs. Fix handrails that are broken or loose. Make sure that handrails are as long as the stairways.  · Check any carpeting to make sure that it is firmly attached to the stairs. Fix any carpet that is loose or worn.  · Avoid having throw rugs at the top or bottom of stairways, or secure the rugs with carpet tape to prevent them from moving.  · Make sure that you have a light switch at the top of the stairs and the bottom of the stairs. If you do not have them, have them installed.  WHAT ARE SOME OTHER FALL PREVENTION TIPS?  · Wear closed-toe shoes that fit well and support your feet. Wear shoes that have rubber soles or low heels.  · When you use a  stepladder, make sure that it is completely opened and that the sides are firmly locked. Have someone hold the ladder while you are using it. Do not climb a closed stepladder.  · Add color or contrast paint or tape to grab bars and handrails in your home. Place contrasting color strips on the first and last steps.  · Use mobility aids as needed, such as canes, walkers, scooters, and crutches.  · Turn on lights if it is dark. Replace any light bulbs that burn out.  · Set up furniture so that there are clear paths. Keep the furniture in the same spot.  · Fix any uneven floor surfaces.  · Choose a carpet design that does not hide the edge of steps of a stairway.  · Be aware of any and all pets.  · Review your medicines with your healthcare provider. Some medicines can cause dizziness or changes in blood pressure, which increase your risk of falling.  Talk with your health care provider about other ways that you can decrease your risk of falls. This may include working with a physical therapist or  to improve your strength, balance, and endurance.     This information is not intended to replace advice given to you by your health care provider. Make sure you discuss any questions you have with your health care provider.     Document Released: 12/08/2003 Document Revised: 04/10/2017 Document Reviewed: 01/22/2016  [employer] Interactive Patient Education ©2017 [employer]  Exercising to Stay Healthy  Exercising regularly is important. It has many health benefits, such as:  · Improving your overall fitness, flexibility, and endurance.  · Increasing your bone density.  · Helping with weight control.  · Decreasing your body fat.  · Increasing your muscle strength.  · Reducing stress and tension.  · Improving your overall health.  In order to become healthy and stay healthy, it is recommended that you do moderate-intensity and vigorous-intensity exercise. You can tell that you are exercising at a moderate intensity if  you have a higher heart rate and faster breathing, but you are still able to hold a conversation. You can tell that you are exercising at a vigorous intensity if you are breathing much harder and faster and cannot hold a conversation while exercising.  HOW OFTEN SHOULD I EXERCISE?  Choose an activity that you enjoy and set realistic goals. Your health care provider can help you to make an activity plan that works for you. Exercise regularly as directed by your health care provider. This may include:   · Doing resistance training twice each week, such as:    Push-ups.    Sit-ups.    Lifting weights.    Using resistance bands.  · Doing a given intensity of exercise for a given amount of time. Choose from these options:    150 minutes of moderate-intensity exercise every week.    75 minutes of vigorous-intensity exercise every week.    A mix of moderate-intensity and vigorous-intensity exercise every week.  Children, pregnant women, people who are out of shape, people who are overweight, and older adults may need to consult a health care provider for individual recommendations. If you have any sort of medical condition, be sure to consult your health care provider before starting a new exercise program.   WHAT ARE SOME EXERCISE IDEAS?  Some moderate-intensity exercise ideas include:   · Walking at a rate of 1 mile in 15 minutes.  · Biking.  · Hiking.  · Golfing.  · Dancing.  Some vigorous-intensity exercise ideas include:   · Walking at a rate of at least 4.5 miles per hour.  · Jogging or running at a rate of 5 miles per hour.  · Biking at a rate of at least 10 miles per hour.  · Lap swimming.  · Roller-skating or in-line skating.  · Cross-country skiing.  · Vigorous competitive sports, such as football, basketball, and soccer.  · Jumping rope.  · Aerobic dancing.  WHAT ARE SOME EVERYDAY ACTIVITIES THAT CAN HELP ME TO GET EXERCISE?  · Yard work, such as:    Pushing a .    Raking and bagging leaves.  · Washing  and waxing your car.  · Pushing a stroller.  · Shoveling snow.  · Gardening.  · Washing windows or floors.  HOW CAN I BE MORE ACTIVE IN MY DAY-TO-DAY ACTIVITIES?  · Use the stairs instead of the elevator.  · Take a walk during your lunch break.  · If you drive, park your car farther away from work or school.  · If you take public transportation, get off one stop early and walk the rest of the way.  · Make all of your phone calls while standing up and walking around.  · Get up, stretch, and walk around every 30 minutes throughout the day.  WHAT GUIDELINES SHOULD I FOLLOW WHILE EXERCISING?  · Do not exercise so much that you hurt yourself, feel dizzy, or get very short of breath.  · Consult your health care provider before starting a new exercise program.  · Wear comfortable clothes and shoes with good support.  · Drink plenty of water while you exercise to prevent dehydration or heat stroke. Body water is lost during exercise and must be replaced.  · Work out until you breathe faster and your heart beats faster.     This information is not intended to replace advice given to you by your health care provider. Make sure you discuss any questions you have with your health care provider.     Document Released: 01/20/2012 Document Revised: 01/08/2016 Document Reviewed: 05/21/2015  [employer] Interactive Patient Education ©2017 [employer]  Aspirin and Your Heart   Aspirin is a medicine that affects the way blood clots. Aspirin can be used to help reduce the risk of blood clots, heart attacks, and other heart-related problems.   SHOULD I TAKE ASPIRIN?  Your health care provider will help you determine whether it is safe and beneficial for you to take aspirin daily. Taking aspirin daily may be beneficial if you:  · Have had a heart attack or chest pain.  · Have undergone open heart surgery such as coronary artery bypass surgery (CABG).  · Have had coronary angioplasty.  · Have experienced a stroke or transient ischemic  attack (TIA).  · Have peripheral vascular disease (PVD).  · Have chronic heart rhythm problems such as atrial fibrillation.  ARE THERE ANY RISKS OF TAKING ASPIRIN DAILY?  Daily use of aspirin can increase your risk of side effects. Some of these include:  · Bleeding. Bleeding problems can be minor or serious. An example of a minor problem is a cut that does not stop bleeding. An example of a more serious problem is stomach bleeding or bleeding into the brain. Your risk of bleeding is increased if you are also taking non-steroidal anti-inflammatory medicine (NSAIDs).  · Increased bruising.  · Upset stomach.  · An allergic reaction. People who have nasal polyps have an increased risk of developing an aspirin allergy.  WHAT ARE SOME GUIDELINES I SHOULD FOLLOW WHEN TAKING ASPIRIN?   · Take aspirin only as directed by your health care provider. Make sure you understand how much you should take and what form you should take. The two forms of aspirin are:    Non-enteric-coated. This type of aspirin does not have a coating and is absorbed quickly. Non-enteric-coated aspirin is usually recommended for people with chest pain. This type of aspirin also comes in a chewable form.    Enteric-coated. This type of aspirin has a special coating that releases the medicine very slowly. Enteric-coated aspirin causes less stomach upset than non-enteric-coated aspirin. This type of aspirin should not be chewed or crushed.  · Drink alcohol in moderation. Drinking alcohol increases your risk of bleeding.  WHEN SHOULD I SEEK MEDICAL CARE?   · You have unusual bleeding or bruising.  · You have stomach pain.  · You have an allergic reaction. Symptoms of an allergic reaction include:    Hives.    Itchy skin.    Swelling of the lips, tongue, or face.  · You have ringing in your ears.  WHEN SHOULD I SEEK IMMEDIATE MEDICAL CARE?   · Your bowel movements are bloody, dark red, or black in color.  · You vomit or cough up blood.  · You have blood in  your urine.  · You cough, wheeze, or feel short of breath.  If you have any of the following symptoms, this is an emergency. Do not wait to see if the pain will go away. Get medical help at once. Call your local emergency services (911 in the [place] Do not drive yourself to the hospital.  · You have severe chest pain, especially if the pain is crushing or pressure-like and spreads to the arms, back, neck, or jaw.   · You have stroke-like symptoms, such as:      Loss of vision.      Difficulty talking.      Numbness or weakness on one side of your body.      Numbness or weakness in your arm or leg.      Not thinking clearly or feeling confused.       This information is not intended to replace advice given to you by your health care provider. Make sure you discuss any questions you have with your health care provider.     Document Released: 11/30/2009 Document Revised: 01/08/2016 Document Reviewed: 03/25/2015  [employer] Interactive Patient Education ©2017 [employer]  Advance Directive  Advance directives are the legal documents that allow you to make choices about your health care and medical treatment if you cannot speak for yourself. Advance directives are a way for you to communicate your wishes to family, friends, and health care providers. The specified people can then convey your decisions about end-of-life care to avoid confusion if you should become unable to communicate.  Ideally, the process of discussing and writing advance directives should happen over time rather than making decisions all at once. Advance directives can be modified as your situation changes, and you can change your mind at any time, even after you have signed the advance directives. Each state has its own laws regarding advance directives.  You may want to check with your health care provider, , or state representative about the law in your state. Below are some examples of advance directives.  HEALTH CARE PROXY AND DURABLE  POWER OF  FOR HEALTH CARE  A health care proxy is a person (agent) appointed to make medical decisions for you if you cannot. Generally, people choose someone they know well and trust to represent their preferences when they can no longer do so. You should be sure to ask this person for agreement to act as your agent. An agent may have to exercise judgment in the event of a medical decision for which your wishes are not known.  A durable power of  for health care is a legal document that names your health care proxy. Depending on the laws in your state, after the document is written, it may also need to be:  · Signed.  · Notarized.  · Dated.  · Copied.  · Witnessed.  · Incorporated into your medical record.  You may also want to appoint someone to manage your financial affairs if you cannot. This is called a durable power of  for finances. It is a separate legal document from the durable power of  for health care. You may choose the same person or someone different from your health care proxy to act as your agent in financial matters.  LIVING WILL  A living will is a set of instructions documenting your wishes about medical care when you cannot care for yourself. It is used if you become:  · Terminally ill.  · Incapacitated.  · Unable to communicate.  · Unable to make decisions.  Items to consider in your living will include:  · The use or non-use of life-sustaining equipment, such as dialysis machines and breathing machines (ventilators).  · A do not resuscitate (DNR) order, which is the instruction not to use cardiopulmonary resuscitation (CPR) if breathing or heartbeat stops.  · Tube feeding.  · Withholding of food and fluids.  · Comfort (palliative) care when the goal becomes comfort rather than a cure.  · Organ and tissue donation.  A living will does not give instructions about distribution of your money and property if you should pass away. It is advisable to seek the expert  advice of a  in drawing up a will regarding your possessions. Decisions about taxes, beneficiaries, and asset distribution will be legally binding. This process can relieve your family and friends of any burdens surrounding disputes or questions that may come up about the allocation of your assets.  DO NOT RESUSCITATE (DNR)  A do not resuscitate (DNR) order is a request to not have CPR in the event that your heart stops beating or you stop breathing. Unless given other instructions, a health care provider will try to help any patient whose heart has stopped or who has stopped breathing.      This information is not intended to replace advice given to you by your health care provider. Make sure you discuss any questions you have with your health care provider.     Document Released: 03/26/2009 Document Revised: 04/10/2017 Document Reviewed: 05/07/2014  [employer] Interactive Patient Education ©2017 [employer]

## 2017-08-23 ENCOUNTER — APPOINTMENT (OUTPATIENT)
Dept: WOMENS IMAGING | Facility: HOSPITAL | Age: 77
End: 2017-08-23

## 2017-08-23 PROCEDURE — 77063 BREAST TOMOSYNTHESIS BI: CPT | Performed by: RADIOLOGY

## 2017-08-23 PROCEDURE — G0202 SCR MAMMO BI INCL CAD: HCPCS | Performed by: RADIOLOGY

## 2017-09-28 RX ORDER — TRIAMTERENE AND HYDROCHLOROTHIAZIDE 37.5; 25 MG/1; MG/1
CAPSULE ORAL
Qty: 90 CAPSULE | Refills: 1 | Status: SHIPPED | OUTPATIENT
Start: 2017-09-28 | End: 2018-04-09 | Stop reason: SDUPTHER

## 2017-10-12 RX ORDER — METOPROLOL SUCCINATE 50 MG/1
TABLET, EXTENDED RELEASE ORAL
Qty: 90 TABLET | Refills: 0 | Status: SHIPPED | OUTPATIENT
Start: 2017-10-12 | End: 2018-02-07 | Stop reason: SDUPTHER

## 2017-10-25 RX ORDER — POTASSIUM CHLORIDE 750 MG/1
TABLET, EXTENDED RELEASE ORAL
Qty: 180 TABLET | Refills: 1 | Status: SHIPPED | OUTPATIENT
Start: 2017-10-25 | End: 2018-05-20 | Stop reason: SDUPTHER

## 2018-02-07 RX ORDER — METOPROLOL SUCCINATE 50 MG/1
TABLET, EXTENDED RELEASE ORAL
Qty: 90 TABLET | Refills: 0 | Status: SHIPPED | OUTPATIENT
Start: 2018-02-07 | End: 2018-05-20 | Stop reason: SDUPTHER

## 2018-02-13 ENCOUNTER — OFFICE VISIT (OUTPATIENT)
Dept: INTERNAL MEDICINE | Facility: CLINIC | Age: 78
End: 2018-02-13

## 2018-02-13 VITALS
SYSTOLIC BLOOD PRESSURE: 122 MMHG | BODY MASS INDEX: 29.56 KG/M2 | HEIGHT: 59 IN | OXYGEN SATURATION: 92 % | RESPIRATION RATE: 16 BRPM | TEMPERATURE: 97.5 F | WEIGHT: 146.6 LBS | DIASTOLIC BLOOD PRESSURE: 62 MMHG | HEART RATE: 62 BPM

## 2018-02-13 DIAGNOSIS — E78.5 HYPERLIPIDEMIA, UNSPECIFIED HYPERLIPIDEMIA TYPE: ICD-10-CM

## 2018-02-13 DIAGNOSIS — R73.02 IGT (IMPAIRED GLUCOSE TOLERANCE): Primary | ICD-10-CM

## 2018-02-13 DIAGNOSIS — R73.02 IMPAIRED GLUCOSE TOLERANCE: ICD-10-CM

## 2018-02-13 DIAGNOSIS — I10 ESSENTIAL HYPERTENSION: ICD-10-CM

## 2018-02-13 DIAGNOSIS — I49.3 PVC (PREMATURE VENTRICULAR CONTRACTION): ICD-10-CM

## 2018-02-13 LAB
CHOLEST SERPL-MCNC: 187 MG/DL (ref 0–200)
GLUCOSE BLDC GLUCOMTR-MCNC: 104 MG/DL (ref 70–130)
HBA1C MFR BLD: 5.76 % (ref 4.8–5.6)
HDLC SERPL-MCNC: 47 MG/DL (ref 40–60)
LDLC SERPL CALC-MCNC: 114 MG/DL (ref 0–100)
TRIGL SERPL-MCNC: 130 MG/DL (ref 0–150)
VLDLC SERPL CALC-MCNC: 26 MG/DL (ref 5–40)

## 2018-02-13 PROCEDURE — 99214 OFFICE O/P EST MOD 30 MIN: CPT | Performed by: INTERNAL MEDICINE

## 2018-02-13 PROCEDURE — 82962 GLUCOSE BLOOD TEST: CPT | Performed by: INTERNAL MEDICINE

## 2018-02-13 RX ORDER — MEDROXYPROGESTERONE ACETATE 2.5 MG/1
2.5 TABLET ORAL DAILY
COMMUNITY
Start: 2018-01-10 | End: 2022-01-01 | Stop reason: HOSPADM

## 2018-02-13 RX ORDER — ESTRADIOL 0.04 MG/D
1 PATCH TRANSDERMAL WEEKLY
COMMUNITY
Start: 2018-01-26 | End: 2020-01-24

## 2018-02-13 NOTE — PROGRESS NOTES
Subjective   Cesilia Lutz is a 77 y.o. female.     Chief Complaint   Patient presents with   • Hypertension   • Hyperlipidemia         HPI Comments: In for recheck of chronic IGT.    Hypertension   This is a chronic problem. The current episode started more than 1 year ago. The problem is unchanged. The problem is controlled. Pertinent negatives include no chest pain, headaches, orthopnea, palpitations, peripheral edema, PND or shortness of breath. There are no associated agents to hypertension. Past treatments include beta blockers and diuretics. The current treatment provides significant improvement. There are no compliance problems.  There is no history of angina, kidney disease, CAD/MI, CVA or heart failure.   Hyperlipidemia   This is a chronic problem. The current episode started more than 1 year ago. The problem is controlled. Recent lipid tests were reviewed and are normal. Factors aggravating her hyperlipidemia include thiazides and beta blockers. Pertinent negatives include no chest pain or shortness of breath. Current antihyperlipidemic treatment includes statins. The current treatment provides significant improvement of lipids. There are no compliance problems.  There are no known risk factors for coronary artery disease.        The following portions of the patient's history were reviewed and updated as appropriate: allergies, current medications, past social history and problem list.    Outpatient Prescriptions Marked as Taking for the 2/13/18 encounter (Office Visit) with Quinn Cabrera MD   Medication Sig Dispense Refill   • CLIMARA 0.0375 MG/24HR      • medroxyPROGESTERone (PROVERA) 2.5 MG tablet      • metoprolol succinate XL (TOPROL-XL) 50 MG 24 hr tablet TAKE 1 TABLET DAILY 90 tablet 0   • potassium chloride (K-DUR,KLOR-CON) 10 MEQ CR tablet TAKE 2 TABLETS DAILY 180 tablet 1   • rosuvastatin (CRESTOR) 20 MG tablet Take 1 tablet by mouth Daily. 90 tablet 1   • triamterene-hydrochlorothiazide  (DYAZIDE) 37.5-25 MG per capsule TAKE 1 CAPSULE EVERY MORNING 90 capsule 1       Review of Systems   Constitutional: Negative for chills, fatigue and fever.   Respiratory: Negative for cough, shortness of breath and wheezing.    Cardiovascular: Negative for chest pain, palpitations, orthopnea, leg swelling and PND.   Gastrointestinal: Negative for abdominal pain, constipation, diarrhea, nausea and vomiting.   Neurological: Negative for headaches.       Objective   Vitals:    02/13/18 1308   BP: 122/62   Pulse: 62   Resp: 16   Temp: 97.5 °F (36.4 °C)   SpO2: 92%      Last Weight    02/13/18  1308   Weight: 66.5 kg (146 lb 9.6 oz)    [unfilled]  Body mass index is 29.59 kg/(m^2).      Physical Exam   Constitutional: She appears well-developed and well-nourished. No distress.   HENT:   Head: Normocephalic and atraumatic.   Neck: Carotid bruit is not present. No thyromegaly present.   Cardiovascular: Normal rate, normal heart sounds and intact distal pulses.  An irregular rhythm present. Frequent extrasystoles are present. Exam reveals no gallop.    No murmur heard.  Bigeminy on exam   Pulmonary/Chest: Effort normal and breath sounds normal. No respiratory distress. She has no wheezes. She has no rales.   Abdominal: Soft. Bowel sounds are normal. She exhibits mass. There is no tenderness. There is no guarding.       Large mass superior and to the left of the umbilicus that is firm and measures about 6-8 cm in diameter.  She states this was previously investigated and found to be a hernia.         Problem List Items Addressed This Visit        Cardiovascular and Mediastinum    Hypertension    PVC (premature ventricular contraction)    Hyperlipidemia       Endocrine    Impaired glucose tolerance      Other Visit Diagnoses     IGT (impaired glucose tolerance)    -  Primary    Relevant Orders    POC Glucose        Assessment/Plan   In for recheck of hypertension, hyperlipidemia, and IGT.  Annual lab work done August  2017.  Gets glucose, A1c, and lipids every 4 months.  Feels great.  Tolerating medications well.  Very active.  Walks regularly.  No changes are made today.  NIDHI AP in April 2018.           Dragon disclaimer:   Much of this encounter note is an electronic transcription/translation of spoken language to printed text. The electronic translation of spoken language may permit erroneous, or at times, nonsensical words or phrases to be inadvertently transcribed; Although I have reviewed the note for such errors, some may still exist.

## 2018-04-09 RX ORDER — TRIAMTERENE AND HYDROCHLOROTHIAZIDE 37.5; 25 MG/1; MG/1
CAPSULE ORAL
Qty: 90 CAPSULE | Refills: 1 | Status: SHIPPED | OUTPATIENT
Start: 2018-04-09 | End: 2018-10-09 | Stop reason: SDUPTHER

## 2018-05-21 RX ORDER — METOPROLOL SUCCINATE 50 MG/1
TABLET, EXTENDED RELEASE ORAL
Qty: 90 TABLET | Refills: 1 | Status: SHIPPED | OUTPATIENT
Start: 2018-05-21 | End: 2018-12-05 | Stop reason: SDUPTHER

## 2018-05-21 RX ORDER — POTASSIUM CHLORIDE 750 MG/1
TABLET, EXTENDED RELEASE ORAL
Qty: 180 TABLET | Refills: 1 | Status: SHIPPED | OUTPATIENT
Start: 2018-05-21 | End: 2019-08-12 | Stop reason: SDUPTHER

## 2018-06-20 RX ORDER — ROSUVASTATIN CALCIUM 20 MG/1
20 TABLET, COATED ORAL DAILY
Qty: 90 TABLET | Refills: 0 | Status: SHIPPED | OUTPATIENT
Start: 2018-06-20 | End: 2019-02-14 | Stop reason: SDUPTHER

## 2018-06-22 RX ORDER — ROSUVASTATIN CALCIUM 20 MG/1
TABLET, COATED ORAL
Qty: 30 TABLET | Refills: 5 | Status: SHIPPED | OUTPATIENT
Start: 2018-06-22 | End: 2018-07-13 | Stop reason: SDUPTHER

## 2018-07-13 ENCOUNTER — OFFICE VISIT (OUTPATIENT)
Dept: INTERNAL MEDICINE | Facility: CLINIC | Age: 78
End: 2018-07-13

## 2018-07-13 VITALS
HEART RATE: 69 BPM | RESPIRATION RATE: 16 BRPM | BODY MASS INDEX: 30.92 KG/M2 | DIASTOLIC BLOOD PRESSURE: 70 MMHG | SYSTOLIC BLOOD PRESSURE: 138 MMHG | TEMPERATURE: 98.1 F | WEIGHT: 153.4 LBS | OXYGEN SATURATION: 96 % | HEIGHT: 59 IN

## 2018-07-13 DIAGNOSIS — I10 ESSENTIAL HYPERTENSION: Primary | ICD-10-CM

## 2018-07-13 DIAGNOSIS — I49.3 PVC (PREMATURE VENTRICULAR CONTRACTION): ICD-10-CM

## 2018-07-13 DIAGNOSIS — E78.5 HYPERLIPIDEMIA, UNSPECIFIED HYPERLIPIDEMIA TYPE: ICD-10-CM

## 2018-07-13 DIAGNOSIS — R73.02 IMPAIRED GLUCOSE TOLERANCE: ICD-10-CM

## 2018-07-13 DIAGNOSIS — Z79.899 MEDICATION MANAGEMENT: ICD-10-CM

## 2018-07-13 LAB
ALBUMIN SERPL-MCNC: 4.3 G/DL (ref 3.5–5.2)
ALBUMIN/GLOB SERPL: 1.8 G/DL
ALP SERPL-CCNC: 53 U/L (ref 39–117)
ALT SERPL-CCNC: 16 U/L (ref 1–33)
AST SERPL-CCNC: 20 U/L (ref 1–32)
BASOPHILS # BLD AUTO: 0.05 10*3/MM3 (ref 0–0.2)
BASOPHILS NFR BLD AUTO: 0.7 % (ref 0–1.5)
BILIRUB SERPL-MCNC: 1.3 MG/DL (ref 0.1–1.2)
BUN SERPL-MCNC: 21 MG/DL (ref 8–23)
BUN/CREAT SERPL: 21.6 (ref 7–25)
CALCIUM SERPL-MCNC: 9.9 MG/DL (ref 8.6–10.5)
CHLORIDE SERPL-SCNC: 101 MMOL/L (ref 98–107)
CHOLEST SERPL-MCNC: 157 MG/DL (ref 0–200)
CO2 SERPL-SCNC: 23.9 MMOL/L (ref 22–29)
CREAT SERPL-MCNC: 0.97 MG/DL (ref 0.57–1)
EOSINOPHIL # BLD AUTO: 0.4 10*3/MM3 (ref 0–0.7)
EOSINOPHIL NFR BLD AUTO: 5.8 % (ref 0.3–6.2)
ERYTHROCYTE [DISTWIDTH] IN BLOOD BY AUTOMATED COUNT: 13.3 % (ref 11.7–13)
GLOBULIN SER CALC-MCNC: 2.4 GM/DL
GLUCOSE SERPL-MCNC: 110 MG/DL (ref 65–99)
HBA1C MFR BLD: 6.14 % (ref 4.8–5.6)
HCT VFR BLD AUTO: 41.2 % (ref 35.6–45.5)
HDLC SERPL-MCNC: 40 MG/DL (ref 40–60)
HGB BLD-MCNC: 13.3 G/DL (ref 11.9–15.5)
IMM GRANULOCYTES # BLD: 0 10*3/MM3 (ref 0–0.03)
IMM GRANULOCYTES NFR BLD: 0 % (ref 0–0.5)
LDLC SERPL CALC-MCNC: 93 MG/DL (ref 0–100)
LYMPHOCYTES # BLD AUTO: 2.03 10*3/MM3 (ref 0.9–4.8)
LYMPHOCYTES NFR BLD AUTO: 29.4 % (ref 19.6–45.3)
MCH RBC QN AUTO: 32 PG (ref 26.9–32)
MCHC RBC AUTO-ENTMCNC: 32.3 G/DL (ref 32.4–36.3)
MCV RBC AUTO: 99 FL (ref 80.5–98.2)
MONOCYTES # BLD AUTO: 0.52 10*3/MM3 (ref 0.2–1.2)
MONOCYTES NFR BLD AUTO: 7.5 % (ref 5–12)
NEUTROPHILS # BLD AUTO: 3.9 10*3/MM3 (ref 1.9–8.1)
NEUTROPHILS NFR BLD AUTO: 56.6 % (ref 42.7–76)
PLATELET # BLD AUTO: 283 10*3/MM3 (ref 140–500)
POTASSIUM SERPL-SCNC: 3.8 MMOL/L (ref 3.5–5.2)
PROT SERPL-MCNC: 6.7 G/DL (ref 6–8.5)
RBC # BLD AUTO: 4.16 10*6/MM3 (ref 3.9–5.2)
SODIUM SERPL-SCNC: 142 MMOL/L (ref 136–145)
TRIGL SERPL-MCNC: 122 MG/DL (ref 0–150)
VLDLC SERPL CALC-MCNC: 24.4 MG/DL (ref 5–40)
WBC # BLD AUTO: 6.9 10*3/MM3 (ref 4.5–10.7)

## 2018-07-13 PROCEDURE — 90715 TDAP VACCINE 7 YRS/> IM: CPT | Performed by: INTERNAL MEDICINE

## 2018-07-13 PROCEDURE — 99214 OFFICE O/P EST MOD 30 MIN: CPT | Performed by: INTERNAL MEDICINE

## 2018-07-13 PROCEDURE — 90471 IMMUNIZATION ADMIN: CPT | Performed by: INTERNAL MEDICINE

## 2018-07-13 NOTE — PROGRESS NOTES
Subjective   Cesilia Lutz is a 78 y.o. female.     Chief Complaint   Patient presents with   • Hypertension   • Hyperglycemia   • Hyperlipidemia         In for recheck of chronic IGT.      Hypertension   This is a chronic problem. The current episode started more than 1 year ago. The problem is unchanged. The problem is controlled. Pertinent negatives include no chest pain, headaches, orthopnea, palpitations, peripheral edema, PND or shortness of breath. There are no associated agents to hypertension. Current antihypertension treatment includes beta blockers and diuretics. The current treatment provides significant improvement. There are no compliance problems.  There is no history of angina, kidney disease, CAD/MI, CVA or heart failure.   Hyperglycemia   Pertinent negatives include no abdominal pain, chest pain, chills, coughing, fatigue, fever, headaches, nausea or vomiting.   Hyperlipidemia   This is a chronic problem. The current episode started more than 1 year ago. The problem is controlled. Recent lipid tests were reviewed and are normal. Factors aggravating her hyperlipidemia include thiazides and beta blockers. Pertinent negatives include no chest pain or shortness of breath. Current antihyperlipidemic treatment includes statins. The current treatment provides significant improvement of lipids. There are no compliance problems.  There are no known risk factors for coronary artery disease.        The following portions of the patient's history were reviewed and updated as appropriate: allergies, current medications, past social history and problem list.    Outpatient Prescriptions Marked as Taking for the 7/13/18 encounter (Office Visit) with Quinn Cabrera MD   Medication Sig Dispense Refill   • CLIMARA 0.0375 MG/24HR Place 1 patch on the skin 1 (One) Time Per Week.     • medroxyPROGESTERone (PROVERA) 2.5 MG tablet Take 2.5 mg by mouth Daily.     • metoprolol succinate XL (TOPROL-XL) 50 MG 24 hr tablet  TAKE 1 TABLET DAILY 90 tablet 1   • potassium chloride (K-DUR,KLOR-CON) 10 MEQ CR tablet TAKE 2 TABLETS DAILY 180 tablet 1   • rosuvastatin (CRESTOR) 20 MG tablet Take 1 tablet by mouth Daily. 90 tablet 0   • triamterene-hydrochlorothiazide (DYAZIDE) 37.5-25 MG per capsule TAKE 1 CAPSULE EVERY MORNING 90 capsule 1       Review of Systems   Constitutional: Negative for chills, fatigue and fever.   Respiratory: Negative for cough, shortness of breath and wheezing.    Cardiovascular: Negative for chest pain, palpitations, orthopnea, leg swelling and PND.   Gastrointestinal: Negative for abdominal pain, constipation, diarrhea, nausea and vomiting.   Neurological: Negative for headaches.       Objective   Vitals:    07/13/18 1304   BP: 138/70   Pulse: 69   Resp: 16   Temp: 98.1 °F (36.7 °C)   SpO2: 96%      1    07/13/18  1304   Weight: 69.6 kg (153 lb 6.4 oz)    [unfilled]  Body mass index is 30.97 kg/m².      Physical Exam   Constitutional: She appears well-developed and well-nourished. No distress.   HENT:   Head: Normocephalic and atraumatic.   Neck: Carotid bruit is not present. No thyromegaly present.   Cardiovascular: Normal rate, normal heart sounds and intact distal pulses.  An irregular rhythm present. Frequent extrasystoles are present. Exam reveals no gallop.    No murmur heard.  Bigeminy on exam   Pulmonary/Chest: Effort normal and breath sounds normal. No respiratory distress. She has no wheezes. She has no rales.   Abdominal: Soft. Bowel sounds are normal. She exhibits mass. There is no tenderness. There is no guarding.       Large mass superior and to the left of the umbilicus that is firm and measures about 6-8 cm in diameter.  She states this was previously investigated and found to be a hernia.         Problem List Items Addressed This Visit        Cardiovascular and Mediastinum    Hypertension - Primary    PVC (premature ventricular contraction)    Hyperlipidemia       Endocrine    Impaired  glucose tolerance        Assessment/Plan   In for recheck of hypertension, hyperlipidemia, and IGT.  Annual lab work due today July 2018.  Gets glucose, A1c, and lipids every 4 months.  Feels great.  Tolerating medications well.  Very active.  Walks regularly.  No changes are made today.  TD AP due today.          Dragon disclaimer:   Much of this encounter note is an electronic transcription/translation of spoken language to printed text. The electronic translation of spoken language may permit erroneous, or at times, nonsensical words or phrases to be inadvertently transcribed; Although I have reviewed the note for such errors, some may still exist.

## 2018-07-13 NOTE — PATIENT INSTRUCTIONS
Calorie Counting for Weight Loss  Calories are units of energy. Your body needs a certain amount of calories from food to keep you going throughout the day. When you eat more calories than your body needs, your body stores the extra calories as fat. When you eat fewer calories than your body needs, your body burns fat to get the energy it needs.  Calorie counting means keeping track of how many calories you eat and drink each day. Calorie counting can be helpful if you need to lose weight. If you make sure to eat fewer calories than your body needs, you should lose weight. Ask your health care provider what a healthy weight is for you.  For calorie counting to work, you will need to eat the right number of calories in a day in order to lose a healthy amount of weight per week. A dietitian can help you determine how many calories you need in a day and will give you suggestions on how to reach your calorie goal.  · A healthy amount of weight to lose per week is usually 1-2 lb (0.5-0.9 kg). This usually means that your daily calorie intake should be reduced by 500-750 calories.  · Eating 1,200 - 1,500 calories per day can help most women lose weight.  · Eating 1,500 - 1,800 calories per day can help most men lose weight.    What is my plan?  My goal is to have __________ calories per day.  If I have this many calories per day, I should lose around __________ pounds per week.  What do I need to know about calorie counting?  In order to meet your daily calorie goal, you will need to:  · Find out how many calories are in each food you would like to eat. Try to do this before you eat.  · Decide how much of the food you plan to eat.  · Write down what you ate and how many calories it had. Doing this is called keeping a food log.    To successfully lose weight, it is important to balance calorie counting with a healthy lifestyle that includes regular activity. Aim for 150 minutes of moderate exercise (such as walking) or 75  minutes of vigorous exercise (such as running) each week.  Where do I find calorie information?    The number of calories in a food can be found on a Nutrition Facts label. If a food does not have a Nutrition Facts label, try to look up the calories online or ask your dietitian for help.  Remember that calories are listed per serving. If you choose to have more than one serving of a food, you will have to multiply the calories per serving by the amount of servings you plan to eat. For example, the label on a package of bread might say that a serving size is 1 slice and that there are 90 calories in a serving. If you eat 1 slice, you will have eaten 90 calories. If you eat 2 slices, you will have eaten 180 calories.  How do I keep a food log?  Immediately after each meal, record the following information in your food log:  · What you ate. Don't forget to include toppings, sauces, and other extras on the food.  · How much you ate. This can be measured in cups, ounces, or number of items.  · How many calories each food and drink had.  · The total number of calories in the meal.    Keep your food log near you, such as in a small notebook in your pocket, or use a mobile zena or website. Some programs will calculate calories for you and show you how many calories you have left for the day to meet your goal.  What are some calorie counting tips?  · Use your calories on foods and drinks that will fill you up and not leave you hungry:  ? Some examples of foods that fill you up are nuts and nut butters, vegetables, lean proteins, and high-fiber foods like whole grains. High-fiber foods are foods with more than 5 g fiber per serving.  ? Drinks such as sodas, specialty coffee drinks, alcohol, and juices have a lot of calories, yet do not fill you up.  · Eat nutritious foods and avoid empty calories. Empty calories are calories you get from foods or beverages that do not have many vitamins or protein, such as candy, sweets, and  "soda. It is better to have a nutritious high-calorie food (such as an avocado) than a food with few nutrients (such as a bag of chips).  · Know how many calories are in the foods you eat most often. This will help you calculate calorie counts faster.  · Pay attention to calories in drinks. Low-calorie drinks include water and unsweetened drinks.  · Pay attention to nutrition labels for \"low fat\" or \"fat free\" foods. These foods sometimes have the same amount of calories or more calories than the full fat versions. They also often have added sugar, starch, or salt, to make up for flavor that was removed with the fat.  · Find a way of tracking calories that works for you. Get creative. Try different apps or programs if writing down calories does not work for you.  What are some portion control tips?  · Know how many calories are in a serving. This will help you know how many servings of a certain food you can have.  · Use a measuring cup to measure serving sizes. You could also try weighing out portions on a kitchen scale. With time, you will be able to estimate serving sizes for some foods.  · Take some time to put servings of different foods on your favorite plates, bowls, and cups so you know what a serving looks like.  · Try not to eat straight from a bag or box. Doing this can lead to overeating. Put the amount you would like to eat in a cup or on a plate to make sure you are eating the right portion.  · Use smaller plates, glasses, and bowls to prevent overeating.  · Try not to multitask (for example, watch TV or use your computer) while eating. If it is time to eat, sit down at a table and enjoy your food. This will help you to know when you are full. It will also help you to be aware of what you are eating and how much you are eating.  What are tips for following this plan?  Reading food labels  · Check the calorie count compared to the serving size. The serving size may be smaller than what you are used to " eating.  · Check the source of the calories. Make sure the food you are eating is high in vitamins and protein and low in saturated and trans fats.  Shopping  · Read nutrition labels while you shop. This will help you make healthy decisions before you decide to purchase your food.  · Make a grocery list and stick to it.  Cooking  · Try to cook your favorite foods in a healthier way. For example, try baking instead of frying.  · Use low-fat dairy products.  Meal planning  · Use more fruits and vegetables. Half of your plate should be fruits and vegetables.  · Include lean proteins like poultry and fish.  How do I count calories when eating out?  · Ask for smaller portion sizes.  · Consider sharing an entree and sides instead of getting your own entree.  · If you get your own entree, eat only half. Ask for a box at the beginning of your meal and put the rest of your entree in it so you are not tempted to eat it.  · If calories are listed on the menu, choose the lower calorie options.  · Choose dishes that include vegetables, fruits, whole grains, low-fat dairy products, and lean protein.  · Choose items that are boiled, broiled, grilled, or steamed. Stay away from items that are buttered, battered, fried, or served with cream sauce. Items labeled “crispy” are usually fried, unless stated otherwise.  · Choose water, low-fat milk, unsweetened iced tea, or other drinks without added sugar. If you want an alcoholic beverage, choose a lower calorie option such as a glass of wine or light beer.  · Ask for dressings, sauces, and syrups on the side. These are usually high in calories, so you should limit the amount you eat.  · If you want a salad, choose a garden salad and ask for grilled meats. Avoid extra toppings like kennedy, cheese, or fried items. Ask for the dressing on the side, or ask for olive oil and vinegar or lemon to use as dressing.  · Estimate how many servings of a food you are given. For example, a serving of  cooked rice is ½ cup or about the size of half a baseball. Knowing serving sizes will help you be aware of how much food you are eating at restaurants. The list below tells you how big or small some common portion sizes are based on everyday objects:  ? 1 oz--4 stacked dice.  ? 3 oz--1 deck of cards.  ? 1 tsp--1 die.  ? 1 Tbsp--½ a ping-pong ball.  ? 2 Tbsp--1 ping-pong ball.  ? ½ cup--½ baseball.  ? 1 cup--1 baseball.  Summary  · Calorie counting means keeping track of how many calories you eat and drink each day. If you eat fewer calories than your body needs, you should lose weight.  · A healthy amount of weight to lose per week is usually 1-2 lb (0.5-0.9 kg). This usually means reducing your daily calorie intake by 500-750 calories.  · The number of calories in a food can be found on a Nutrition Facts label. If a food does not have a Nutrition Facts label, try to look up the calories online or ask your dietitian for help.  · Use your calories on foods and drinks that will fill you up, and not on foods and drinks that will leave you hungry.  · Use smaller plates, glasses, and bowls to prevent overeating.  This information is not intended to replace advice given to you by your health care provider. Make sure you discuss any questions you have with your health care provider.  Document Released: 12/18/2006 Document Revised: 11/17/2017 Document Reviewed: 11/17/2017  [employer] Interactive Patient Education © 2018 [employer]  Exercising to Lose Weight  Exercising can help you to lose weight. In order to lose weight through exercise, you need to do vigorous-intensity exercise. You can tell that you are exercising with vigorous intensity if you are breathing very hard and fast and cannot hold a conversation while exercising.  Moderate-intensity exercise helps to maintain your current weight. You can tell that you are exercising at a moderate level if you have a higher heart rate and faster breathing, but you are still  able to hold a conversation.  How often should I exercise?  Choose an activity that you enjoy and set realistic goals. Your health care provider can help you to make an activity plan that works for you. Exercise regularly as directed by your health care provider. This may include:  · Doing resistance training twice each week, such as:  ? Push-ups.  ? Sit-ups.  ? Lifting weights.  ? Using resistance bands.  · Doing a given intensity of exercise for a given amount of time. Choose from these options:  ? 150 minutes of moderate-intensity exercise every week.  ? 75 minutes of vigorous-intensity exercise every week.  ? A mix of moderate-intensity and vigorous-intensity exercise every week.    Children, pregnant women, people who are out of shape, people who are overweight, and older adults may need to consult a health care provider for individual recommendations. If you have any sort of medical condition, be sure to consult your health care provider before starting a new exercise program.  What are some activities that can help me to lose weight?  · Walking at a rate of at least 4.5 miles an hour.  · Jogging or running at a rate of 5 miles per hour.  · Biking at a rate of at least 10 miles per hour.  · Lap swimming.  · Roller-skating or in-line skating.  · Cross-country skiing.  · Vigorous competitive sports, such as football, basketball, and soccer.  · Jumping rope.  · Aerobic dancing.  How can I be more active in my day-to-day activities?  · Use the stairs instead of the elevator.  · Take a walk during your lunch break.  · If you drive, park your car farther away from work or school.  · If you take public transportation, get off one stop early and walk the rest of the way.  · Make all of your phone calls while standing up and walking around.  · Get up, stretch, and walk around every 30 minutes throughout the day.  What guidelines should I follow while exercising?  · Do not exercise so much that you hurt yourself, feel  dizzy, or get very short of breath.  · Consult your health care provider prior to starting a new exercise program.  · Wear comfortable clothes and shoes with good support.  · Drink plenty of water while you exercise to prevent dehydration or heat stroke. Body water is lost during exercise and must be replaced.  · Work out until you breathe faster and your heart beats faster.  This information is not intended to replace advice given to you by your health care provider. Make sure you discuss any questions you have with your health care provider.  Document Released: 01/20/2012 Document Revised: 05/25/2017 Document Reviewed: 05/21/2015  [employer] Interactive Patient Education © 2018 [employer]  BMI for Adults  Body mass index (BMI) is a number that is calculated from a person's weight and height. In most adults, the number is used to find how much of an adult's weight is made up of fat. BMI is not as accurate as a direct measure of body fat.  How is BMI calculated?  BMI is calculated by dividing weight in kilograms by height in meters squared. It can also be calculated by dividing weight in pounds by height in inches squared, then multiplying the resulting number by 703. Charts are available to help you find your BMI quickly and easily without doing this calculation.  How is BMI interpreted?  Health care professionals use BMI charts to identify whether an adult is underweight, at a normal weight, or overweight based on the following guidelines:  · Underweight: BMI less than 18.5.  · Normal weight: BMI between 18.5 and 24.9.  · Overweight: BMI between 25 and 29.9.  · Obese: BMI of 30 and above.    BMI is usually interpreted the same for males and females.  Weight includes both fat and muscle, so someone with a muscular build, such as an athlete, may have a BMI that is higher than 24.9. In cases like these, BMI may not accurately depict body fat. To determine if excess body fat is the cause of a BMI of 25 or higher,  further assessments may need to be done by a health care provider.  Why is BMI a useful tool?  BMI is used to identify a possible weight problem that may be related to a medical problem or may increase the risk for medical problems. BMI can also be used to promote changes to reach a healthy weight.  This information is not intended to replace advice given to you by your health care provider. Make sure you discuss any questions you have with your health care provider.  Document Released: 2005 Document Revised: 2017 Document Reviewed: 05/15/2015  [employer] Interactive Patient Education © 2018 [employer]  Tdap Vaccine (Tetanus, Diphtheria and Pertussis): What You Need to Know  1. Why get vaccinated?  Tetanus, diphtheria and pertussis are very serious diseases. Tdap vaccine can protect us from these diseases. And, Tdap vaccine given to pregnant women can protect  babies against pertussis.  TETANUS (Lockjaw) is rare in the [place] today. It causes painful muscle tightening and stiffness, usually all over the body.  · It can lead to tightening of muscles in the head and neck so you can't open your mouth, swallow, or sometimes even breathe. Tetanus kills about 1 out of 10 people who are infected even after receiving the best medical care.    DIPHTHERIA is also rare in the [place] today. It can cause a thick coating to form in the back of the throat.  · It can lead to breathing problems, heart failure, paralysis, and death.    PERTUSSIS (Whooping Cough) causes severe coughing spells, which can cause difficulty breathing, vomiting and disturbed sleep.  · It can also lead to weight loss, incontinence, and rib fractures. Up to 2 in 100 adolescents and 5 in 100 adults with pertussis are hospitalized or have complications, which could include pneumonia or death.    These diseases are caused by bacteria. Diphtheria and pertussis are spread from person to person through secretions from coughing or  sneezing. Tetanus enters the body through cuts, scratches, or wounds.  Before vaccines, as many as 200,000 cases of diphtheria, 200,000 cases of pertussis, and hundreds of cases of tetanus, were reported in the [place] each year. Since vaccination began, reports of cases for tetanus and diphtheria have dropped by about 99% and for pertussis by about 80%.  2. Tdap vaccine  Tdap vaccine can protect adolescents and adults from tetanus, diphtheria, and pertussis. One dose of Tdap is routinely given at age 11 or 12. People who did not get Tdap at that age should get it as soon as possible.  Tdap is especially important for healthcare professionals and anyone having close contact with a baby younger than 12 months.  Pregnant women should get a dose of Tdap during every pregnancy, to protect the  from pertussis. Infants are most at risk for severe, life-threatening complications from pertussis.  Another vaccine, called Td, protects against tetanus and diphtheria, but not pertussis. A Td booster should be given every 10 years. Tdap may be given as one of these boosters if you have never gotten Tdap before. Tdap may also be given after a severe cut or burn to prevent tetanus infection.  Your doctor or the person giving you the vaccine can give you more information.  Tdap may safely be given at the same time as other vaccines.  3. Some people should not get this vaccine  · A person who has ever had a life-threatening allergic reaction after a previous dose of any diphtheria, tetanus or pertussis containing vaccine, OR has a severe allergy to any part of this vaccine, should not get Tdap vaccine. Tell the person giving the vaccine about any severe allergies.  · Anyone who had coma or long repeated seizures within 7 days after a childhood dose of DTP or DTaP, or a previous dose of Tdap, should not get Tdap, unless a cause other than the vaccine was found. They can still get Td.  · Talk to your doctor if  you:  ? have seizures or another nervous system problem,  ? had severe pain or swelling after any vaccine containing diphtheria, tetanus or pertussis,  ? ever had a condition called Guillain-Barré Syndrome (GBS),  ? aren't feeling well on the day the shot is scheduled.  4. Risks  With any medicine, including vaccines, there is a chance of side effects. These are usually mild and go away on their own. Serious reactions are also possible but are rare.  Most people who get Tdap vaccine do not have any problems with it.  Mild problems following Tdap:  (Did not interfere with activities)  · Pain where the shot was given (about 3 in 4 adolescents or 2 in 3 adults)  · Redness or swelling where the shot was given (about 1 person in 5)  · Mild fever of at least 100.4°F (up to about 1 in 25 adolescents or 1 in 100 adults)  · Headache (about 3 or 4 people in 10)  · Tiredness (about 1 person in 3 or 4)  · Nausea, vomiting, diarrhea, stomach ache (up to 1 in 4 adolescents or 1 in 10 adults)  · Chills, sore joints (about 1 person in 10)  · Body aches (about 1 person in 3 or 4)  · Rash, swollen glands (uncommon)    Moderate problems following Tdap:  (Interfered with activities, but did not require medical attention)  · Pain where the shot was given (up to 1 in 5 or 6)  · Redness or swelling where the shot was given (up to about 1 in 16 adolescents or 1 in 12 adults)  · Fever over 102°F (about 1 in 100 adolescents or 1 in 250 adults)  · Headache (about 1 in 7 adolescents or 1 in 10 adults)  · Nausea, vomiting, diarrhea, stomach ache (up to 1 or 3 people in 100)  · Swelling of the entire arm where the shot was given (up to about 1 in 500).    Severe problems following Tdap:  (Unable to perform usual activities; required medical attention)  · Swelling, severe pain, bleeding and redness in the arm where the shot was given (rare).    Problems that could happen after any vaccine:  · People sometimes faint after a medical procedure,  including vaccination. Sitting or lying down for about 15 minutes can help prevent fainting, and injuries caused by a fall. Tell your doctor if you feel dizzy, or have vision changes or ringing in the ears.  · Some people get severe pain in the shoulder and have difficulty moving the arm where a shot was given. This happens very rarely.  · Any medication can cause a severe allergic reaction. Such reactions from a vaccine are very rare, estimated at fewer than 1 in a million doses, and would happen within a few minutes to a few hours after the vaccination.  As with any medicine, there is a very remote chance of a vaccine causing a serious injury or death.  The safety of vaccines is always being monitored. For more information, visit: www.cdc.gov/vaccinesafety/  5. What if there is a serious problem?  What should I look for?  Look for anything that concerns you, such as signs of a severe allergic reaction, very high fever, or unusual behavior.  Signs of a severe allergic reaction can include hives, swelling of the face and throat, difficulty breathing, a fast heartbeat, dizziness, and weakness. These would usually start a few minutes to a few hours after the vaccination.  What should I do?  · If you think it is a severe allergic reaction or other emergency that can’t wait, call 9-1-1 or get the person to the nearest hospital. Otherwise, call your doctor.  · Afterward, the reaction should be reported to the Vaccine Adverse Event Reporting System (VAERS). Your doctor might file this report, or you can do it yourself through the VAERS web site at www.vaers.hhs.gov, or by calling 1-918.477.5451.  ? VAERS does not give medical advice.  6. The National Vaccine Injury Compensation Program  The National Vaccine Injury Compensation Program (VICP) is a federal program that was created to compensate people who may have been injured by certain vaccines.  Persons who believe they may have been injured by a vaccine can learn about  the program and about filing a claim by calling 1-120.328.9333 or visiting the VICP website at www.[facility].gov/vaccinecompensation. There is a time limit to file a claim for compensation.  7. How can I learn more?  · Ask your doctor. He or she can give you the vaccine package insert or suggest other sources of information.  · Call your local or state health department.  · Contact the Centers for Disease Control and Prevention (CDC):  ? Call 1-110.338.7503 (9-137-HWF-INFO) or  ? Visit CDC’s website at www.cdc.gov/vaccines  CDC Tdap Vaccine VIS (2/24/15)  This information is not intended to replace advice given to you by your health care provider. Make sure you discuss any questions you have with your health care provider.  Document Released: 2013 Document Revised: 2017 Document Reviewed: 2017  [employer] Interactive Patient Education © 2017 [employer]  Tdap Vaccine (Tetanus, Diphtheria and Pertussis): What You Need to Know  1. Why get vaccinated?  Tetanus, diphtheria and pertussis are very serious diseases. Tdap vaccine can protect us from these diseases. And, Tdap vaccine given to pregnant women can protect  babies against pertussis.  TETANUS (Lockjaw) is rare in the [place] today. It causes painful muscle tightening and stiffness, usually all over the body.  · It can lead to tightening of muscles in the head and neck so you can't open your mouth, swallow, or sometimes even breathe. Tetanus kills about 1 out of 10 people who are infected even after receiving the best medical care.    DIPHTHERIA is also rare in the [place] today. It can cause a thick coating to form in the back of the throat.  · It can lead to breathing problems, heart failure, paralysis, and death.    PERTUSSIS (Whooping Cough) causes severe coughing spells, which can cause difficulty breathing, vomiting and disturbed sleep.  · It can also lead to weight loss, incontinence, and rib fractures. Up to 2 in 100  adolescents and 5 in 100 adults with pertussis are hospitalized or have complications, which could include pneumonia or death.    These diseases are caused by bacteria. Diphtheria and pertussis are spread from person to person through secretions from coughing or sneezing. Tetanus enters the body through cuts, scratches, or wounds.  Before vaccines, as many as 200,000 cases of diphtheria, 200,000 cases of pertussis, and hundreds of cases of tetanus, were reported in the [place] each year. Since vaccination began, reports of cases for tetanus and diphtheria have dropped by about 99% and for pertussis by about 80%.  2. Tdap vaccine  Tdap vaccine can protect adolescents and adults from tetanus, diphtheria, and pertussis. One dose of Tdap is routinely given at age 11 or 12. People who did not get Tdap at that age should get it as soon as possible.  Tdap is especially important for healthcare professionals and anyone having close contact with a baby younger than 12 months.  Pregnant women should get a dose of Tdap during every pregnancy, to protect the  from pertussis. Infants are most at risk for severe, life-threatening complications from pertussis.  Another vaccine, called Td, protects against tetanus and diphtheria, but not pertussis. A Td booster should be given every 10 years. Tdap may be given as one of these boosters if you have never gotten Tdap before. Tdap may also be given after a severe cut or burn to prevent tetanus infection.  Your doctor or the person giving you the vaccine can give you more information.  Tdap may safely be given at the same time as other vaccines.  3. Some people should not get this vaccine  · A person who has ever had a life-threatening allergic reaction after a previous dose of any diphtheria, tetanus or pertussis containing vaccine, OR has a severe allergy to any part of this vaccine, should not get Tdap vaccine. Tell the person giving the vaccine about any severe  allergies.  · Anyone who had coma or long repeated seizures within 7 days after a childhood dose of DTP or DTaP, or a previous dose of Tdap, should not get Tdap, unless a cause other than the vaccine was found. They can still get Td.  · Talk to your doctor if you:  ? have seizures or another nervous system problem,  ? had severe pain or swelling after any vaccine containing diphtheria, tetanus or pertussis,  ? ever had a condition called Guillain-Barré Syndrome (GBS),  ? aren't feeling well on the day the shot is scheduled.  4. Risks  With any medicine, including vaccines, there is a chance of side effects. These are usually mild and go away on their own. Serious reactions are also possible but are rare.  Most people who get Tdap vaccine do not have any problems with it.  Mild problems following Tdap:  (Did not interfere with activities)  · Pain where the shot was given (about 3 in 4 adolescents or 2 in 3 adults)  · Redness or swelling where the shot was given (about 1 person in 5)  · Mild fever of at least 100.4°F (up to about 1 in 25 adolescents or 1 in 100 adults)  · Headache (about 3 or 4 people in 10)  · Tiredness (about 1 person in 3 or 4)  · Nausea, vomiting, diarrhea, stomach ache (up to 1 in 4 adolescents or 1 in 10 adults)  · Chills, sore joints (about 1 person in 10)  · Body aches (about 1 person in 3 or 4)  · Rash, swollen glands (uncommon)    Moderate problems following Tdap:  (Interfered with activities, but did not require medical attention)  · Pain where the shot was given (up to 1 in 5 or 6)  · Redness or swelling where the shot was given (up to about 1 in 16 adolescents or 1 in 12 adults)  · Fever over 102°F (about 1 in 100 adolescents or 1 in 250 adults)  · Headache (about 1 in 7 adolescents or 1 in 10 adults)  · Nausea, vomiting, diarrhea, stomach ache (up to 1 or 3 people in 100)  · Swelling of the entire arm where the shot was given (up to about 1 in 500).    Severe problems following  Tdap:  (Unable to perform usual activities; required medical attention)  · Swelling, severe pain, bleeding and redness in the arm where the shot was given (rare).    Problems that could happen after any vaccine:  · People sometimes faint after a medical procedure, including vaccination. Sitting or lying down for about 15 minutes can help prevent fainting, and injuries caused by a fall. Tell your doctor if you feel dizzy, or have vision changes or ringing in the ears.  · Some people get severe pain in the shoulder and have difficulty moving the arm where a shot was given. This happens very rarely.  · Any medication can cause a severe allergic reaction. Such reactions from a vaccine are very rare, estimated at fewer than 1 in a million doses, and would happen within a few minutes to a few hours after the vaccination.  As with any medicine, there is a very remote chance of a vaccine causing a serious injury or death.  The safety of vaccines is always being monitored. For more information, visit: www.cdc.gov/vaccinesafety/  5. What if there is a serious problem?  What should I look for?  Look for anything that concerns you, such as signs of a severe allergic reaction, very high fever, or unusual behavior.  Signs of a severe allergic reaction can include hives, swelling of the face and throat, difficulty breathing, a fast heartbeat, dizziness, and weakness. These would usually start a few minutes to a few hours after the vaccination.  What should I do?  · If you think it is a severe allergic reaction or other emergency that can’t wait, call 9-1-1 or get the person to the nearest hospital. Otherwise, call your doctor.  · Afterward, the reaction should be reported to the Vaccine Adverse Event Reporting System (VAERS). Your doctor might file this report, or you can do it yourself through the VAERS web site at www.vaers.hhs.gov, or by calling 1-277.822.2476.  ? VAERS does not give medical advice.  6. The National Vaccine  Injury Compensation Program  The National Vaccine Injury Compensation Program (VICP) is a federal program that was created to compensate people who may have been injured by certain vaccines.  Persons who believe they may have been injured by a vaccine can learn about the program and about filing a claim by calling 1-394.665.6422 or visiting the VICP website at www.[facility].gov/vaccinecompensation. There is a time limit to file a claim for compensation.  7. How can I learn more?  · Ask your doctor. He or she can give you the vaccine package insert or suggest other sources of information.  · Call your local or state health department.  · Contact the Centers for Disease Control and Prevention (CDC):  ? Call 1-775.432.9628 (2-799-RSA-INFO) or  ? Visit CDC’s website at www.cdc.gov/vaccines  CDC Tdap Vaccine VIS (2/24/15)  This information is not intended to replace advice given to you by your health care provider. Make sure you discuss any questions you have with your health care provider.  Document Released: 06/18/2013 Document Revised: 09/07/2017 Document Reviewed: 09/07/2017  Elsevier Interactive Patient Education © 2017 [employer].

## 2018-10-09 RX ORDER — TRIAMTERENE AND HYDROCHLOROTHIAZIDE 37.5; 25 MG/1; MG/1
CAPSULE ORAL
Qty: 90 CAPSULE | Refills: 1 | Status: SHIPPED | OUTPATIENT
Start: 2018-10-09 | End: 2019-04-17 | Stop reason: SDUPTHER

## 2018-10-10 ENCOUNTER — APPOINTMENT (OUTPATIENT)
Dept: WOMENS IMAGING | Facility: HOSPITAL | Age: 78
End: 2018-10-10

## 2018-10-10 PROCEDURE — 77067 SCR MAMMO BI INCL CAD: CPT | Performed by: RADIOLOGY

## 2018-10-10 PROCEDURE — 77063 BREAST TOMOSYNTHESIS BI: CPT | Performed by: RADIOLOGY

## 2018-10-18 ENCOUNTER — APPOINTMENT (OUTPATIENT)
Dept: WOMENS IMAGING | Facility: HOSPITAL | Age: 78
End: 2018-10-18

## 2018-10-18 PROCEDURE — 77065 DX MAMMO INCL CAD UNI: CPT | Performed by: RADIOLOGY

## 2018-10-30 ENCOUNTER — OFFICE VISIT (OUTPATIENT)
Dept: SURGERY | Facility: CLINIC | Age: 78
End: 2018-10-30

## 2018-10-30 VITALS
HEART RATE: 65 BPM | DIASTOLIC BLOOD PRESSURE: 74 MMHG | BODY MASS INDEX: 30.84 KG/M2 | OXYGEN SATURATION: 98 % | WEIGHT: 153 LBS | SYSTOLIC BLOOD PRESSURE: 122 MMHG | HEIGHT: 59 IN

## 2018-10-30 DIAGNOSIS — K43.2 INCISIONAL HERNIA, WITHOUT OBSTRUCTION OR GANGRENE: Primary | ICD-10-CM

## 2018-10-30 PROCEDURE — 99203 OFFICE O/P NEW LOW 30 MIN: CPT | Performed by: SURGERY

## 2018-10-30 RX ORDER — ASPIRIN 81 MG/1
81 TABLET, CHEWABLE ORAL DAILY
COMMUNITY
End: 2019-07-01

## 2018-10-30 NOTE — PROGRESS NOTES
"Chief complaint: Abdominal mass     Patient is a 78 y.o. female referred by Xiomara Díaz M.D. for evaluation of abdominal mass.  Patient reports she noticed this mass several years ago but has gotten larger or over the last few years.  Patient reports when she is up and doing things it gets larger and can be uncomfortable but is not painful.  Patient denies nausea or vomiting, fever or chills.  Activities make it worse rest makes it better.    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   • Hyperlipidemia    • Hypertension    • IGT (impaired glucose tolerance)    • PVC (premature ventricular contraction)      Past Surgical History:   Procedure Laterality Date   • ANKLE ARTHROPLASTY Right    • CARPAL TUNNEL RELEASE     • CHOLECYSTECTOMY     • COLONOSCOPY N/A 2/21/2017    Procedure: COLONOSCOPY TO TRANSVERSE COLON WITH COLD SNARE POLYPECTOMY;  Surgeon: Carlos Payton MD;  Location: [facility] ENDOSCOPY;  Service:    • GANGLION CYST EXCISION Left     x 2     Family History   Problem Relation Age of Onset   • COPD Brother      Social History   Substance Use Topics   • Smoking status: Never Smoker   • Smokeless tobacco: Never Used   • Alcohol use No     Allergies   Allergen Reactions   • Fluvirin [Influenza Virus Vaccine Split] Anaphylaxis       (Not in a hospital admission)    Review of Systems   Gastrointestinal: Positive for abdominal distention, abdominal pain and constipation.   Musculoskeletal: Positive for arthralgias, back pain and joint swelling.   Hematological: Bruises/bleeds easily.   All other systems reviewed and are negative.  .  Vitals:    10/30/18 1119   BP: 122/74   Pulse: 65   SpO2: 98%   Weight: 69.4 kg (153 lb)   Height: 149.9 cm (59\")       Physical Exam  General/physcological:   Alert and oriented x3, in no acute distress  HEENT: Normal cephalic, atraumatic, PERRLA, EOMI, sclera anicteric, moist mucous membranes, neck " is supple, no JVD, no carotid bruits, no thyromegaly no adenopathy  Respiratory: CTA and percussion  CVA: RRR, normal S1-S2, no murmurs, no gallops or rubs  GI: Positive BS, soft, nondistended, nontender, no rebound, no guarding, patient has an incisional hernia that is nontender and nonreducible, no organomegaly and no masses  Musculoskeletal: Full range of motion, no clubbing, no cyanosis or edema  Neurovascular: Grossly intact  Debilities: none  Emotional behavior: appropriate     Assessment:  Non-reducible incisional hernia  Plan:  I advised patient that this should be repaired to prevent incarceration strangulation.  I have given her patient teaching pamphlet on hernias.  I have discussed the surgery with the patient in detail.  I have discussed the risks, benefits and alternatives.  I have discussed the risk of anesthesia, bleeding, infection, bowel injuries and recurrence.  At this time the patient wants to think about it and get back with my office.    Shyla Bartholomew MD  General, Minimally Invasive and Endoscopic Surgery  [facility]    [address], [address]  [place], [place], [address]  P: [phone]  F: [phone]    Cc:  Quinn Cabrera MD

## 2018-11-05 ENCOUNTER — APPOINTMENT (OUTPATIENT)
Dept: WOMENS IMAGING | Facility: HOSPITAL | Age: 78
End: 2018-11-05

## 2018-11-05 PROCEDURE — 19081 BX BREAST 1ST LESION STRTCTC: CPT | Performed by: RADIOLOGY

## 2018-12-05 RX ORDER — METOPROLOL SUCCINATE 50 MG/1
TABLET, EXTENDED RELEASE ORAL
Qty: 90 TABLET | Refills: 1 | Status: SHIPPED | OUTPATIENT
Start: 2018-12-05 | End: 2019-06-13 | Stop reason: SDUPTHER

## 2019-02-14 RX ORDER — ROSUVASTATIN CALCIUM 20 MG/1
TABLET, COATED ORAL
Qty: 90 TABLET | Refills: 0 | Status: SHIPPED | OUTPATIENT
Start: 2019-02-14 | End: 2019-06-25 | Stop reason: SDUPTHER

## 2019-04-10 ENCOUNTER — TRANSCRIBE ORDERS (OUTPATIENT)
Dept: ADMINISTRATIVE | Facility: HOSPITAL | Age: 79
End: 2019-04-10

## 2019-04-10 ENCOUNTER — LAB (OUTPATIENT)
Dept: LAB | Facility: HOSPITAL | Age: 79
End: 2019-04-10

## 2019-04-10 DIAGNOSIS — Z01.818 PRE-OP TESTING: ICD-10-CM

## 2019-04-10 DIAGNOSIS — Z01.818 PRE-OP TESTING: Primary | ICD-10-CM

## 2019-04-10 LAB
ALBUMIN SERPL-MCNC: 4.1 G/DL (ref 3.5–5.2)
ALBUMIN/GLOB SERPL: 1.2 G/DL
ALP SERPL-CCNC: 59 U/L (ref 39–117)
ALT SERPL W P-5'-P-CCNC: 16 U/L (ref 1–33)
ANION GAP SERPL CALCULATED.3IONS-SCNC: 14.5 MMOL/L
AST SERPL-CCNC: 19 U/L (ref 1–32)
BILIRUB SERPL-MCNC: 1.7 MG/DL (ref 0.1–1.2)
BUN BLD-MCNC: 17 MG/DL (ref 8–23)
BUN/CREAT SERPL: 17 (ref 7–25)
CALCIUM SPEC-SCNC: 10.2 MG/DL (ref 8.6–10.5)
CHLORIDE SERPL-SCNC: 99 MMOL/L (ref 98–107)
CO2 SERPL-SCNC: 26.5 MMOL/L (ref 22–29)
CREAT BLD-MCNC: 1 MG/DL (ref 0.57–1)
DEPRECATED RDW RBC AUTO: 43.2 FL (ref 37–54)
ERYTHROCYTE [DISTWIDTH] IN BLOOD BY AUTOMATED COUNT: 12.4 % (ref 12.3–15.4)
GFR SERPL CREATININE-BSD FRML MDRD: 53 ML/MIN/1.73
GLOBULIN UR ELPH-MCNC: 3.4 GM/DL
GLUCOSE BLD-MCNC: 134 MG/DL (ref 65–99)
HCT VFR BLD AUTO: 42.6 % (ref 34–46.6)
HGB BLD-MCNC: 14.7 G/DL (ref 12–15.9)
MCH RBC QN AUTO: 32.6 PG (ref 26.6–33)
MCHC RBC AUTO-ENTMCNC: 34.5 G/DL (ref 31.5–35.7)
MCV RBC AUTO: 94.5 FL (ref 79–97)
PLATELET # BLD AUTO: 328 10*3/MM3 (ref 140–450)
PMV BLD AUTO: 8.9 FL (ref 6–12)
POTASSIUM BLD-SCNC: 3.7 MMOL/L (ref 3.5–5.2)
PROT SERPL-MCNC: 7.5 G/DL (ref 6–8.5)
RBC # BLD AUTO: 4.51 10*6/MM3 (ref 3.77–5.28)
SODIUM BLD-SCNC: 140 MMOL/L (ref 136–145)
WBC NRBC COR # BLD: 7.62 10*3/MM3 (ref 3.4–10.8)

## 2019-04-10 PROCEDURE — 80053 COMPREHEN METABOLIC PANEL: CPT

## 2019-04-10 PROCEDURE — 36415 COLL VENOUS BLD VENIPUNCTURE: CPT

## 2019-04-10 PROCEDURE — 85027 COMPLETE CBC AUTOMATED: CPT

## 2019-04-15 ENCOUNTER — OUTSIDE FACILITY SERVICE (OUTPATIENT)
Dept: SURGERY | Facility: CLINIC | Age: 79
End: 2019-04-15

## 2019-04-15 PROCEDURE — 49561 PR REPAIR INCISIONAL HERNIA,STRANG: CPT | Performed by: SURGERY

## 2019-04-15 PROCEDURE — 49568 PR IMPLANT MESH HERNIA REPAIR/DEBRIDEMENT CLOSURE: CPT | Performed by: SURGERY

## 2019-04-17 RX ORDER — TRIAMTERENE AND HYDROCHLOROTHIAZIDE 37.5; 25 MG/1; MG/1
1 CAPSULE ORAL EVERY MORNING
Qty: 90 CAPSULE | Refills: 1 | Status: SHIPPED | OUTPATIENT
Start: 2019-04-17 | End: 2019-10-30 | Stop reason: SDUPTHER

## 2019-04-30 ENCOUNTER — OFFICE VISIT (OUTPATIENT)
Dept: SURGERY | Facility: CLINIC | Age: 79
End: 2019-04-30

## 2019-04-30 VITALS
HEIGHT: 59 IN | BODY MASS INDEX: 29.6 KG/M2 | SYSTOLIC BLOOD PRESSURE: 108 MMHG | HEART RATE: 82 BPM | DIASTOLIC BLOOD PRESSURE: 72 MMHG | WEIGHT: 146.8 LBS | OXYGEN SATURATION: 98 %

## 2019-04-30 DIAGNOSIS — Z09 FOLLOW UP: Primary | ICD-10-CM

## 2019-04-30 PROCEDURE — 99024 POSTOP FOLLOW-UP VISIT: CPT | Performed by: SURGERY

## 2019-04-30 NOTE — PROGRESS NOTES
"Chief complaint:  Post-op  Follow up    History of Present Illness    This is Cesilia Lutz 79 y.o. status post ventral hernia repair and is doing very well.  Patient denies fever, chills, nausea or vomiting.  Patient's pain is well-controlled.      The following portions of the patient's history were reviewed and updated as appropriate: allergies, current medications, past family history, past medical history, past social history, past surgical history and problem list.    Vitals:    04/30/19 1046   BP: 108/72   Pulse: 82   SpO2: 98%   Weight: 66.6 kg (146 lb 12.8 oz)   Height: 149.9 cm (59\")       Physical Exam  Gen.: Patient is alert and oriented ×3, no acute distress  HEENT: Normal cephalic, atraumatic, moist mucous membranes, anicteric  Incision is well-healed without evidence of infection.    Assessment/plan:    This is Cesilia Lutz 79 y.o. status post ventral hernia repair and is doing very well.  I have instructed the patient not lift greater than 10 pounds for total of 6 weeks from the time of surgery. I have instructed the patient follow-up as needed.    Shyla Bartholomew MD  General, Minimally Invasive and Endoscopic Surgery  [facility]    [address]   [address]    [address]  [address] [address]    P: [phone]  F: [phone]    Cc:  Quinn Cabrera MD  "

## 2019-05-14 ENCOUNTER — OFFICE VISIT (OUTPATIENT)
Dept: SURGERY | Facility: CLINIC | Age: 79
End: 2019-05-14

## 2019-05-14 VITALS
DIASTOLIC BLOOD PRESSURE: 80 MMHG | WEIGHT: 144.9 LBS | SYSTOLIC BLOOD PRESSURE: 132 MMHG | HEART RATE: 73 BPM | BODY MASS INDEX: 29.21 KG/M2 | OXYGEN SATURATION: 95 % | HEIGHT: 59 IN

## 2019-05-14 DIAGNOSIS — Z09 FOLLOW UP: Primary | ICD-10-CM

## 2019-05-14 PROCEDURE — 99024 POSTOP FOLLOW-UP VISIT: CPT | Performed by: SURGERY

## 2019-05-14 NOTE — PROGRESS NOTES
"Chief complaint:  Post-op  Follow up    History of Present Illness    This is Cesilia Lutz 79 y.o. status post ventral hernia repair with mesh and is doing very well.  Patient denies fever, chills, nausea or vomiting.  Patient's pain is well-controlled.      The following portions of the patient's history were reviewed and updated as appropriate: allergies, current medications, past family history, past medical history, past social history, past surgical history and problem list.    Vitals:    05/14/19 1135   BP: 132/80   Pulse: 73   SpO2: 95%   Weight: 65.7 kg (144 lb 14.4 oz)   Height: 149.9 cm (59\")       Physical Exam  Gen.: Patient is alert and oriented ×3, no acute distress  HEENT: Normal cephalic, atraumatic, moist mucous membranes, anicteric  Incision is well-healed without evidence of infection, herniation or seroma.    Assessment/plan:    This is Cesilia Lutz 79 y.o. status post ventral hernia repair with mesh and is doing very well.  I have instructed the patient not lift greater than 10 pounds for total of 6 weeks from the time of surgery. I have instructed the patient follow-up as needed.    Shyla Bartholomew MD  General, Minimally Invasive and Endoscopic Surgery  [facility]    [address]    [address]  [place], [place] [address] [place] [address]    P: [phone]  F: [phone]    Cc:  Quinn Cabrera MD  "

## 2019-06-13 RX ORDER — METOPROLOL SUCCINATE 50 MG/1
TABLET, EXTENDED RELEASE ORAL
Qty: 90 TABLET | Refills: 1 | Status: SHIPPED | OUTPATIENT
Start: 2019-06-13 | End: 2019-12-17 | Stop reason: SDUPTHER

## 2019-06-25 RX ORDER — ROSUVASTATIN CALCIUM 20 MG/1
TABLET, COATED ORAL
Qty: 90 TABLET | Refills: 1 | Status: SHIPPED | OUTPATIENT
Start: 2019-06-25 | End: 2020-01-02

## 2019-07-01 ENCOUNTER — OFFICE VISIT (OUTPATIENT)
Dept: INTERNAL MEDICINE | Facility: CLINIC | Age: 79
End: 2019-07-01

## 2019-07-01 VITALS
HEIGHT: 59 IN | SYSTOLIC BLOOD PRESSURE: 118 MMHG | TEMPERATURE: 98.6 F | OXYGEN SATURATION: 96 % | RESPIRATION RATE: 16 BRPM | DIASTOLIC BLOOD PRESSURE: 70 MMHG | BODY MASS INDEX: 30.84 KG/M2 | WEIGHT: 153 LBS | HEART RATE: 69 BPM

## 2019-07-01 DIAGNOSIS — I10 ESSENTIAL HYPERTENSION: Primary | ICD-10-CM

## 2019-07-01 DIAGNOSIS — Z79.899 MEDICATION MANAGEMENT: ICD-10-CM

## 2019-07-01 DIAGNOSIS — R73.02 IMPAIRED GLUCOSE TOLERANCE: ICD-10-CM

## 2019-07-01 DIAGNOSIS — E78.5 HYPERLIPIDEMIA, UNSPECIFIED HYPERLIPIDEMIA TYPE: ICD-10-CM

## 2019-07-01 PROCEDURE — G0439 PPPS, SUBSEQ VISIT: HCPCS | Performed by: INTERNAL MEDICINE

## 2019-07-01 PROCEDURE — 99214 OFFICE O/P EST MOD 30 MIN: CPT | Performed by: INTERNAL MEDICINE

## 2019-07-01 NOTE — PROGRESS NOTES
Subjective   Cesilia Lutz is a 79 y.o. female.     Chief Complaint   Patient presents with   • Hypertension   • Hyperlipidemia         In for recheck of chronic IGT.      Hypertension   This is a chronic problem. The current episode started more than 1 year ago. The problem is unchanged. The problem is controlled. Pertinent negatives include no chest pain, headaches, orthopnea, palpitations, peripheral edema, PND or shortness of breath. There are no associated agents to hypertension. Current antihypertension treatment includes beta blockers and diuretics. The current treatment provides significant improvement. There are no compliance problems.  There is no history of angina, kidney disease, CAD/MI, CVA or heart failure.   Hyperlipidemia   This is a chronic problem. The current episode started more than 1 year ago. The problem is controlled. Recent lipid tests were reviewed and are normal. Factors aggravating her hyperlipidemia include thiazides and beta blockers. Pertinent negatives include no chest pain or shortness of breath. Current antihyperlipidemic treatment includes statins. The current treatment provides significant improvement of lipids. There are no compliance problems.  There are no known risk factors for coronary artery disease.   Hyperglycemia   Pertinent negatives include no abdominal pain, chest pain, chills, coughing, fatigue, fever, headaches, nausea or vomiting.        The following portions of the patient's history were reviewed and updated as appropriate: allergies, current medications, past social history and problem list.    Outpatient Medications Marked as Taking for the 7/1/19 encounter (Office Visit) with Quinn Cabrera MD   Medication Sig Dispense Refill   • aspirin 81 MG chewable tablet Chew 81 mg Daily.     • CLIMARA 0.0375 MG/24HR Place 1 patch on the skin 1 (One) Time Per Week.     • Cyanocobalamin (VITAMIN B 12 PO) Take  by mouth.     • medroxyPROGESTERone (PROVERA) 2.5 MG tablet  Take 2.5 mg by mouth Daily.     • metoprolol succinate XL (TOPROL-XL) 50 MG 24 hr tablet TAKE 1 TABLET DAILY 90 tablet 1   • potassium chloride (K-DUR,KLOR-CON) 10 MEQ CR tablet TAKE 2 TABLETS DAILY 180 tablet 1   • rosuvastatin (CRESTOR) 20 MG tablet TAKE 1 TABLET BY MOUTH ONCE DAILY 90 tablet 1   • triamterene-hydrochlorothiazide (DYAZIDE) 37.5-25 MG per capsule Take 1 capsule by mouth Every Morning. 90 capsule 1       Review of Systems   Constitutional: Negative for chills, fatigue and fever.   Respiratory: Negative for cough, shortness of breath and wheezing.    Cardiovascular: Negative for chest pain, palpitations, orthopnea, leg swelling and PND.   Gastrointestinal: Negative for abdominal pain, constipation, diarrhea, nausea and vomiting.   Neurological: Negative for headaches.       Objective   Vitals:    07/01/19 1349   BP: 118/70   Pulse: 69   Resp: 16   Temp: 98.6 °F (37 °C)   SpO2: 96%          07/01/19  1349   Weight: 69.4 kg (153 lb)    [unfilled]  Body mass index is 30.9 kg/m².      Physical Exam   Constitutional: She appears well-developed and well-nourished. No distress.   HENT:   Head: Normocephalic and atraumatic.   Neck: Carotid bruit is not present. No thyromegaly present.   Cardiovascular: Normal rate and intact distal pulses. An irregular rhythm present.  No extrasystoles are present. Exam reveals no gallop.   Murmur heard.   Crescendo decrescendo systolic murmur is present with a grade of 2/6.  Aortic area   Pulmonary/Chest: Effort normal and breath sounds normal. No respiratory distress. She has no wheezes. She has no rales.   Abdominal: Soft. Bowel sounds are normal. She exhibits no mass. There is no tenderness. There is no guarding.         Problem List Items Addressed This Visit        Cardiovascular and Mediastinum    Hyperlipidemia    Hypertension - Primary       Endocrine    Impaired glucose tolerance        Assessment/Plan   In for recheck of hypertension, hyperlipidemia, and IGT.   Annual lab work due today July 2019.  Gets glucose, A1c, and lipids every 6 months.  Feels great.  Tolerating medications well.  Very active.  Walks regularly.  No changes are made today.  AWV today.         Dragon disclaimer:   Much of this encounter note is an electronic transcription/translation of spoken language to printed text. The electronic translation of spoken language may permit erroneous, or at times, nonsensical words or phrases to be inadvertently transcribed; Although I have reviewed the note for such errors, some may still exist.

## 2019-07-01 NOTE — PROGRESS NOTES
QUICK REFERENCE INFORMATION:  The ABCs of the Annual Wellness Visit    Subsequent Medicare Wellness Visit     HEALTH RISK ASSESSMENT    : 1940    Recent Hospitalizations:  No hospitalization(s) within the last year..  ccc      Current Medical Providers:  Patient Care Team:  Quinn Cabrera MD as PCP - General (Internal Medicine)  Quinn Cabrera MD as PCP - Internal Medicine (Internal Medicine)  Xiomara Díaz MD as Consulting Physician (Obstetrics and Gynecology)        Smoking Status:  Social History     Tobacco Use   Smoking Status Never Smoker   Smokeless Tobacco Never Used       Alcohol Consumption:  Social History     Substance and Sexual Activity   Alcohol Use No       Depression Screen:   PHQ-2/PHQ-9 Depression Screening 2019   Little interest or pleasure in doing things 0   Feeling down, depressed, or hopeless 0   Trouble falling or staying asleep, or sleeping too much 0   Feeling tired or having little energy 0   Poor appetite or overeating 1   Feeling bad about yourself - or that you are a failure or have let yourself or your family down 0   Trouble concentrating on things, such as reading the newspaper or watching television 0   Moving or speaking so slowly that other people could have noticed. Or the opposite - being so fidgety or restless that you have been moving around a lot more than usual 0   Thoughts that you would be better off dead, or of hurting yourself in some way 0   Total Score 1   If you checked off any problems, how difficult have these problems made it for you to do your work, take care of things at home, or get along with other people? Not difficult at all       Health Habits and Functional and Cognitive Screening:  Functional & Cognitive Status 2019   Do you have difficulty preparing food and eating? No   Do you have difficulty bathing yourself, getting dressed or grooming yourself? No   Do you have difficulty using the toilet? No   Do you have difficulty moving  around from place to place? No   Do you have trouble with steps or getting out of a bed or a chair? Yes   In the past year have you fallen or experienced a near fall? No   Current Diet Well Balanced Diet   Dental Exam Up to date   Eye Exam Up to date   Exercise (times per week) 2 times per week   Current Exercise Activities Include Walking   Do you need help using the phone?  No   Are you deaf or do you have serious difficulty hearing?  No   Do you need help with transportation? No   Do you need help shopping? No   Do you need help preparing meals?  No   Do you need help with housework?  No   Do you need help with laundry? No   Do you need help taking your medications? No   Do you need help managing money? No   Do you ever drive or ride in a car without wearing a seat belt? No   Have you felt unusual stress, anger or loneliness in the last month? No   Who do you live with? Alone   If you need help, do you have trouble finding someone available to you? No   Have you been bothered in the last four weeks by sexual problems? No   Do you have difficulty concentrating, remembering or making decisions? No           Does the patient have evidence of cognitive impairment? No    Asiprin use counseling: Taking ASA appropriately as indicated      Recent Lab Results:    Lab Results   Component Value Date     (H) 07/13/2018     Lab Results   Component Value Date    HGBA1C 6.14 (H) 07/13/2018     Lab Results   Component Value Date    TRIG 122 07/13/2018    HDL 40 07/13/2018    VLDL 24.4 07/13/2018           Age-appropriate Screening Schedule:  Refer to the list below for future screening recommendations based on patient's age, sex and/or medical conditions. Orders for these recommended tests are listed in the plan section. The patient has been provided with a written plan.    Health Maintenance   Topic Date Due   • PAP SMEAR  06/21/2019   • LIPID PANEL  07/13/2019   • MAMMOGRAM  07/30/2019   • COLONOSCOPY  02/21/2027   •  TDAP/TD VACCINES (2 - Td) 07/13/2028   • DXA SCAN  08/23/2032   • PNEUMOCOCCAL VACCINES (65+ LOW/MEDIUM RISK)  Completed   • INFLUENZA VACCINE  Discontinued   • ZOSTER VACCINE  Discontinued        Subjective   History of Present Illness    Cesilia Lutz is a 79 y.o. female who presents for an Annual Wellness Visit.    The following portions of the patient's history were reviewed and updated as appropriate: allergies, current medications, past family history, past medical history, past social history, past surgical history and problem list.    Outpatient Medications Prior to Visit   Medication Sig Dispense Refill   • aspirin 81 MG chewable tablet Chew 81 mg Daily.     • CLIMARA 0.0375 MG/24HR Place 1 patch on the skin 1 (One) Time Per Week.     • Cyanocobalamin (VITAMIN B 12 PO) Take  by mouth.     • medroxyPROGESTERone (PROVERA) 2.5 MG tablet Take 2.5 mg by mouth Daily.     • metoprolol succinate XL (TOPROL-XL) 50 MG 24 hr tablet TAKE 1 TABLET DAILY 90 tablet 1   • potassium chloride (K-DUR,KLOR-CON) 10 MEQ CR tablet TAKE 2 TABLETS DAILY 180 tablet 1   • rosuvastatin (CRESTOR) 20 MG tablet TAKE 1 TABLET BY MOUTH ONCE DAILY 90 tablet 1   • triamterene-hydrochlorothiazide (DYAZIDE) 37.5-25 MG per capsule Take 1 capsule by mouth Every Morning. 90 capsule 1     No facility-administered medications prior to visit.        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    Review of Systems    Compared to one year ago, the patient feels her physical health is the same.  Compared to one year ago, the patient feels  "her mental health is the same.    Objective     Physical Exam    Vitals:    07/01/19 1349   Resp: 16   Weight: 69.4 kg (153 lb)   Height: 149.9 cm (59\")       Patient's Body mass index is 30.9 kg/m². BMI is above normal parameters. Recommendations include: exercise counseling and nutrition counseling.      Assessment/Plan   Patient Self-Management and Personalized Health Advice  The patient has been provided with information about: diet, exercise, weight management, fall prevention and designing advance directives and preventive services including:   · Advance directive, Exercise counseling provided, Fall Risk assessment done, Nutrition counseling provided.    Visit Diagnoses:  No diagnosis found.    No orders of the defined types were placed in this encounter.      Outpatient Encounter Medications as of 7/1/2019   Medication Sig Dispense Refill   • aspirin 81 MG chewable tablet Chew 81 mg Daily.     • CLIMARA 0.0375 MG/24HR Place 1 patch on the skin 1 (One) Time Per Week.     • Cyanocobalamin (VITAMIN B 12 PO) Take  by mouth.     • medroxyPROGESTERone (PROVERA) 2.5 MG tablet Take 2.5 mg by mouth Daily.     • metoprolol succinate XL (TOPROL-XL) 50 MG 24 hr tablet TAKE 1 TABLET DAILY 90 tablet 1   • potassium chloride (K-DUR,KLOR-CON) 10 MEQ CR tablet TAKE 2 TABLETS DAILY 180 tablet 1   • rosuvastatin (CRESTOR) 20 MG tablet TAKE 1 TABLET BY MOUTH ONCE DAILY 90 tablet 1   • triamterene-hydrochlorothiazide (DYAZIDE) 37.5-25 MG per capsule Take 1 capsule by mouth Every Morning. 90 capsule 1     No facility-administered encounter medications on file as of 7/1/2019.        Reviewed use of high risk medication in the elderly: yes  Reviewed for potential of harmful drug interactions in the elderly: yes    Follow Up:  No Follow-up on file.     An After Visit Summary and PPPS with all of these plans were given to the patient.               "

## 2019-07-02 LAB
ALBUMIN SERPL-MCNC: 4.4 G/DL (ref 3.5–5.2)
ALBUMIN/GLOB SERPL: 2 G/DL
ALP SERPL-CCNC: 57 U/L (ref 39–117)
ALT SERPL-CCNC: 13 U/L (ref 1–33)
AST SERPL-CCNC: 18 U/L (ref 1–32)
BASOPHILS # BLD AUTO: 0.08 10*3/MM3 (ref 0–0.2)
BASOPHILS NFR BLD AUTO: 1.2 % (ref 0–1.5)
BILIRUB SERPL-MCNC: 1.3 MG/DL (ref 0.2–1.2)
BUN SERPL-MCNC: 17 MG/DL (ref 8–23)
BUN/CREAT SERPL: 18.1 (ref 7–25)
CALCIUM SERPL-MCNC: 9.6 MG/DL (ref 8.6–10.5)
CHLORIDE SERPL-SCNC: 103 MMOL/L (ref 98–107)
CHOLEST SERPL-MCNC: 179 MG/DL (ref 0–200)
CO2 SERPL-SCNC: 25.1 MMOL/L (ref 22–29)
CREAT SERPL-MCNC: 0.94 MG/DL (ref 0.57–1)
EOSINOPHIL # BLD AUTO: 0.31 10*3/MM3 (ref 0–0.4)
EOSINOPHIL NFR BLD AUTO: 4.8 % (ref 0.3–6.2)
ERYTHROCYTE [DISTWIDTH] IN BLOOD BY AUTOMATED COUNT: 13.3 % (ref 12.3–15.4)
GLOBULIN SER CALC-MCNC: 2.2 GM/DL
GLUCOSE SERPL-MCNC: 115 MG/DL (ref 65–99)
HBA1C MFR BLD: 6.6 % (ref 4.8–5.6)
HCT VFR BLD AUTO: 40.6 % (ref 34–46.6)
HDLC SERPL-MCNC: 38 MG/DL (ref 40–60)
HGB BLD-MCNC: 13 G/DL (ref 12–15.9)
IMM GRANULOCYTES # BLD AUTO: 0.01 10*3/MM3 (ref 0–0.05)
IMM GRANULOCYTES NFR BLD AUTO: 0.2 % (ref 0–0.5)
LDLC SERPL CALC-MCNC: 93 MG/DL (ref 0–100)
LYMPHOCYTES # BLD AUTO: 1.59 10*3/MM3 (ref 0.7–3.1)
LYMPHOCYTES NFR BLD AUTO: 24.5 % (ref 19.6–45.3)
MCH RBC QN AUTO: 31.7 PG (ref 26.6–33)
MCHC RBC AUTO-ENTMCNC: 32 G/DL (ref 31.5–35.7)
MCV RBC AUTO: 99 FL (ref 79–97)
MONOCYTES # BLD AUTO: 0.62 10*3/MM3 (ref 0.1–0.9)
MONOCYTES NFR BLD AUTO: 9.6 % (ref 5–12)
NEUTROPHILS # BLD AUTO: 3.87 10*3/MM3 (ref 1.7–7)
NEUTROPHILS NFR BLD AUTO: 59.7 % (ref 42.7–76)
NRBC BLD AUTO-RTO: 0 /100 WBC (ref 0–0.2)
PLATELET # BLD AUTO: 310 10*3/MM3 (ref 140–450)
POTASSIUM SERPL-SCNC: 3.6 MMOL/L (ref 3.5–5.2)
PROT SERPL-MCNC: 6.6 G/DL (ref 6–8.5)
RBC # BLD AUTO: 4.1 10*6/MM3 (ref 3.77–5.28)
SODIUM SERPL-SCNC: 140 MMOL/L (ref 136–145)
TRIGL SERPL-MCNC: 242 MG/DL (ref 0–150)
VLDLC SERPL CALC-MCNC: 48.4 MG/DL
WBC # BLD AUTO: 6.48 10*3/MM3 (ref 3.4–10.8)

## 2019-08-12 RX ORDER — POTASSIUM CHLORIDE 750 MG/1
TABLET, EXTENDED RELEASE ORAL
Qty: 180 TABLET | Refills: 1 | Status: SHIPPED | OUTPATIENT
Start: 2019-08-12 | End: 2020-08-11 | Stop reason: SDUPTHER

## 2019-10-15 ENCOUNTER — APPOINTMENT (OUTPATIENT)
Dept: WOMENS IMAGING | Facility: HOSPITAL | Age: 79
End: 2019-10-15

## 2019-10-15 PROCEDURE — 77063 BREAST TOMOSYNTHESIS BI: CPT | Performed by: RADIOLOGY

## 2019-10-15 PROCEDURE — 77067 SCR MAMMO BI INCL CAD: CPT | Performed by: RADIOLOGY

## 2019-10-30 RX ORDER — TRIAMTERENE AND HYDROCHLOROTHIAZIDE 37.5; 25 MG/1; MG/1
CAPSULE ORAL
Qty: 90 CAPSULE | Refills: 1 | Status: SHIPPED | OUTPATIENT
Start: 2019-10-30 | End: 2020-05-19 | Stop reason: SDUPTHER

## 2019-12-17 RX ORDER — METOPROLOL SUCCINATE 50 MG/1
TABLET, EXTENDED RELEASE ORAL
Qty: 90 TABLET | Refills: 4 | Status: SHIPPED | OUTPATIENT
Start: 2019-12-17 | End: 2020-04-03 | Stop reason: SDUPTHER

## 2020-01-02 RX ORDER — ROSUVASTATIN CALCIUM 20 MG/1
TABLET, COATED ORAL
Qty: 90 TABLET | Refills: 3 | Status: SHIPPED | OUTPATIENT
Start: 2020-01-02 | End: 2021-05-06

## 2020-01-24 ENCOUNTER — OFFICE VISIT (OUTPATIENT)
Dept: INTERNAL MEDICINE | Facility: CLINIC | Age: 80
End: 2020-01-24

## 2020-01-24 VITALS
HEART RATE: 79 BPM | RESPIRATION RATE: 16 BRPM | HEIGHT: 59 IN | TEMPERATURE: 98.4 F | OXYGEN SATURATION: 96 % | WEIGHT: 151 LBS | DIASTOLIC BLOOD PRESSURE: 68 MMHG | BODY MASS INDEX: 30.44 KG/M2 | SYSTOLIC BLOOD PRESSURE: 120 MMHG

## 2020-01-24 DIAGNOSIS — R73.02 IMPAIRED GLUCOSE TOLERANCE: ICD-10-CM

## 2020-01-24 DIAGNOSIS — I10 ESSENTIAL HYPERTENSION: Primary | ICD-10-CM

## 2020-01-24 DIAGNOSIS — E78.5 HYPERLIPIDEMIA, UNSPECIFIED HYPERLIPIDEMIA TYPE: ICD-10-CM

## 2020-01-24 PROCEDURE — 99214 OFFICE O/P EST MOD 30 MIN: CPT | Performed by: INTERNAL MEDICINE

## 2020-01-24 RX ORDER — ESTRADIOL 0.25 MG/.25G
GEL TOPICAL DAILY
COMMUNITY
End: 2022-01-01 | Stop reason: HOSPADM

## 2020-01-24 NOTE — PROGRESS NOTES
Subjective   Cesilia Lutz is a 79 y.o. female.     Chief Complaint   Patient presents with   • Hypertension   • Hyperlipidemia   • Hyperglycemia         In for recheck of chronic IGT.    Hypertension   This is a chronic problem. The current episode started more than 1 year ago. The problem is unchanged. The problem is controlled. Pertinent negatives include no chest pain, headaches, orthopnea, palpitations, peripheral edema, PND or shortness of breath. There are no associated agents to hypertension. Current antihypertension treatment includes beta blockers and diuretics. The current treatment provides significant improvement. There are no compliance problems.  There is no history of angina, kidney disease, CAD/MI, CVA or heart failure.   Hyperlipidemia   This is a chronic problem. The current episode started more than 1 year ago. The problem is controlled. Recent lipid tests were reviewed and are normal. Factors aggravating her hyperlipidemia include thiazides and beta blockers. Pertinent negatives include no chest pain or shortness of breath. Current antihyperlipidemic treatment includes statins. The current treatment provides significant improvement of lipids. There are no compliance problems.  There are no known risk factors for coronary artery disease.   Hyperglycemia   Pertinent negatives include no abdominal pain, chest pain, chills, coughing, fatigue, fever, headaches, nausea or vomiting.        The following portions of the patient's history were reviewed and updated as appropriate: allergies, current medications, past social history and problem list.    Outpatient Medications Marked as Taking for the 1/24/20 encounter (Office Visit) with Quinn Cabrera MD   Medication Sig Dispense Refill   • Cyanocobalamin (VITAMIN B 12 PO) Take 1 tablet by mouth Daily.     • estradiol 0.25 MG/0.25GM gel Place  on the skin as directed by provider Daily.     • medroxyPROGESTERone (PROVERA) 2.5 MG tablet Take 2.5 mg by  mouth Daily.     • metoprolol succinate XL (TOPROL-XL) 50 MG 24 hr tablet TAKE 1 TABLET DAILY 90 tablet 4   • potassium chloride (K-DUR,KLOR-CON) 10 MEQ CR tablet TAKE 2 TABLETS DAILY 180 tablet 1   • rosuvastatin (CRESTOR) 20 MG tablet TAKE 1 TABLET BY MOUTH ONCE DAILY 90 tablet 3   • triamterene-hydrochlorothiazide (DYAZIDE) 37.5-25 MG per capsule TAKE 1 CAPSULE BY MOUTH ONCE DAILY IN THE MORNING 90 capsule 1       Review of Systems   Constitutional: Negative for chills, fatigue and fever.   Respiratory: Negative for cough, shortness of breath and wheezing.    Cardiovascular: Negative for chest pain, palpitations, orthopnea, leg swelling and PND.   Gastrointestinal: Negative for abdominal pain, constipation, diarrhea, nausea and vomiting.   Neurological: Negative for headaches.       Objective   Vitals:    01/24/20 1240   BP: 120/68   Pulse: 79   Resp: 16   Temp: 98.4 °F (36.9 °C)   SpO2: 96%          01/24/20  1240   Weight: 68.5 kg (151 lb)    [unfilled]  Body mass index is 30.48 kg/m².      Physical Exam   Constitutional: She appears well-developed and well-nourished. No distress.   HENT:   Head: Normocephalic and atraumatic.   Neck: Carotid bruit is not present. No thyromegaly present.   Cardiovascular: Normal rate, regular rhythm and intact distal pulses.  No extrasystoles are present. Exam reveals no gallop.   Murmur heard.   Crescendo decrescendo systolic murmur is present with a grade of 2/6.  Aortic area   Pulmonary/Chest: Effort normal and breath sounds normal. No respiratory distress. She has no wheezes. She has no rales.   Abdominal: Soft. Bowel sounds are normal. She exhibits no mass. There is no tenderness. There is no guarding.         Problem List Items Addressed This Visit        Cardiovascular and Mediastinum    Hyperlipidemia    Hypertension - Primary       Endocrine    Impaired glucose tolerance        Assessment/Plan   In for recheck of hypertension, hyperlipidemia, and IGT.  Annual lab  work done July 2019.  Gets glucose, A1c, and lipids every 6 months.  Feels great.  Tolerating medications well.  Very active.  Walks regularly.  No changes are made today.  AWV July 2020.  She is 6.5 HPP today.         Dragon disclaimer:   Much of this encounter note is an electronic transcription/translation of spoken language to printed text. The electronic translation of spoken language may permit erroneous, or at times, nonsensical words or phrases to be inadvertently transcribed; Although I have reviewed the note for such errors, some may still exist.

## 2020-01-25 LAB
CHOLEST SERPL-MCNC: 203 MG/DL (ref 0–200)
GLUCOSE P FAST SERPL-MCNC: 102 MG/DL (ref 65–99)
HBA1C MFR BLD: 6.9 % (ref 4.8–5.6)
HDLC SERPL-MCNC: 38 MG/DL (ref 40–60)
LDLC SERPL CALC-MCNC: 107 MG/DL (ref 0–100)
TRIGL SERPL-MCNC: 292 MG/DL (ref 0–150)
VLDLC SERPL CALC-MCNC: 58.4 MG/DL

## 2020-04-03 RX ORDER — METOPROLOL SUCCINATE 50 MG/1
50 TABLET, EXTENDED RELEASE ORAL DAILY
Qty: 90 TABLET | Refills: 4 | Status: SHIPPED | OUTPATIENT
Start: 2020-04-03 | End: 2021-07-15

## 2020-05-19 ENCOUNTER — TELEPHONE (OUTPATIENT)
Dept: INTERNAL MEDICINE | Facility: CLINIC | Age: 80
End: 2020-05-19

## 2020-05-19 RX ORDER — TRIAMTERENE AND HYDROCHLOROTHIAZIDE 37.5; 25 MG/1; MG/1
1 CAPSULE ORAL EVERY MORNING
Qty: 90 CAPSULE | Refills: 1 | Status: SHIPPED | OUTPATIENT
Start: 2020-05-19 | End: 2020-12-11

## 2020-05-19 NOTE — TELEPHONE ENCOUNTER
Requesting refill for: Triamterene-Hctz 37.5-25 MG capsule, #90 to [facility] [phone], out of refills.

## 2020-08-10 ENCOUNTER — OFFICE VISIT (OUTPATIENT)
Dept: INTERNAL MEDICINE | Facility: CLINIC | Age: 80
End: 2020-08-10

## 2020-08-10 VITALS
RESPIRATION RATE: 16 BRPM | DIASTOLIC BLOOD PRESSURE: 70 MMHG | TEMPERATURE: 98.6 F | BODY MASS INDEX: 28.63 KG/M2 | HEIGHT: 59 IN | SYSTOLIC BLOOD PRESSURE: 130 MMHG | WEIGHT: 142 LBS | HEART RATE: 71 BPM | OXYGEN SATURATION: 98 %

## 2020-08-10 DIAGNOSIS — I10 ESSENTIAL HYPERTENSION: Primary | ICD-10-CM

## 2020-08-10 DIAGNOSIS — E78.5 HYPERLIPIDEMIA, UNSPECIFIED HYPERLIPIDEMIA TYPE: ICD-10-CM

## 2020-08-10 DIAGNOSIS — R73.02 IMPAIRED GLUCOSE TOLERANCE: ICD-10-CM

## 2020-08-10 DIAGNOSIS — Z79.899 MEDICATION MANAGEMENT: ICD-10-CM

## 2020-08-10 PROCEDURE — 99214 OFFICE O/P EST MOD 30 MIN: CPT | Performed by: INTERNAL MEDICINE

## 2020-08-10 PROCEDURE — G0439 PPPS, SUBSEQ VISIT: HCPCS | Performed by: INTERNAL MEDICINE

## 2020-08-10 NOTE — PROGRESS NOTES
The ABCs of the Annual Wellness Visit  Subsequent Medicare Wellness Visit    Chief Complaint   Patient presents with   • Hypertension   • Hyperlipidemia   • Hyperglycemia   • Medicare Wellness-subsequent       Subjective   History of Present Illness:  Cesilia Lutz is a 80 y.o. female who presents for a Subsequent Medicare Wellness Visit.    HEALTH RISK ASSESSMENT    Recent Hospitalizations:  No hospitalization(s) within the last year.    Current Medical Providers:  Patient Care Team:  Quinn Cabrera MD as PCP - General (Internal Medicine)  Quinn Cabrera MD as PCP - Internal Medicine (Internal Medicine)  Xiomara Díaz MD as Consulting Physician (Obstetrics and Gynecology)    Smoking Status:  Social History     Tobacco Use   Smoking Status Never Smoker   Smokeless Tobacco Never Used       Alcohol Consumption:  Social History     Substance and Sexual Activity   Alcohol Use No       Depression Screen:   PHQ-2/PHQ-9 Depression Screening 8/10/2020   Little interest or pleasure in doing things 0   Feeling down, depressed, or hopeless 0   Trouble falling or staying asleep, or sleeping too much 0   Feeling tired or having little energy 0   Poor appetite or overeating 0   Feeling bad about yourself - or that you are a failure or have let yourself or your family down 0   Trouble concentrating on things, such as reading the newspaper or watching television 0   Moving or speaking so slowly that other people could have noticed. Or the opposite - being so fidgety or restless that you have been moving around a lot more than usual 0   Thoughts that you would be better off dead, or of hurting yourself in some way 0   Total Score 0   If you checked off any problems, how difficult have these problems made it for you to do your work, take care of things at home, or get along with other people? Not difficult at all       Fall Risk Screen:  STEADI Fall Risk Assessment was completed, and patient is at MODERATE risk for falls.  Assessment completed on:8/10/2020    Health Habits and Functional and Cognitive Screening:  Functional & Cognitive Status 8/10/2020   Do you have difficulty preparing food and eating? No   Do you have difficulty bathing yourself, getting dressed or grooming yourself? No   Do you have difficulty using the toilet? No   Do you have difficulty moving around from place to place? No   Do you have trouble with steps or getting out of a bed or a chair? No   Current Diet Unhealthy Diet   Dental Exam Up to date   Eye Exam Up to date   Exercise (times per week) 0 times per week   Current Exercise Activities Include None   Do you need help using the phone?  No   Are you deaf or do you have serious difficulty hearing?  No   Do you need help with transportation? No   Do you need help shopping? No   Do you need help preparing meals?  No   Do you need help with housework?  No   Do you need help with laundry? No   Do you need help taking your medications? No   Do you need help managing money? No   Do you ever drive or ride in a car without wearing a seat belt? No   Have you felt unusual stress, anger or loneliness in the last month? No   Who do you live with? Alone   If you need help, do you have trouble finding someone available to you? No   Have you been bothered in the last four weeks by sexual problems? No   Do you have difficulty concentrating, remembering or making decisions? No         Does the patient have evidence of cognitive impairment? No    Asprin use counseling:Does not need ASA (and currently is not on it)    Age-appropriate Screening Schedule:  Refer to the list below for future screening recommendations based on patient's age, sex and/or medical conditions. Orders for these recommended tests are listed in the plan section. The patient has been provided with a written plan.    Health Maintenance   Topic Date Due   • LIPID PANEL  01/24/2021   • MAMMOGRAM  10/27/2021   • PAP SMEAR  12/01/2021   • COLONOSCOPY   02/21/2027   • TDAP/TD VACCINES (2 - Td) 07/13/2028   • DXA SCAN  08/23/2032   • INFLUENZA VACCINE  Discontinued   • ZOSTER VACCINE  Discontinued          The following portions of the patient's history were reviewed and updated as appropriate: allergies, current medications, past family history, past medical history, past social history, past surgical history and problem list.    Outpatient Medications Prior to Visit   Medication Sig Dispense Refill   • Cyanocobalamin (VITAMIN B 12 PO) Take 1 tablet by mouth Daily.     • estradiol 0.25 MG/0.25GM gel Place  on the skin as directed by provider Daily.     • medroxyPROGESTERone (PROVERA) 2.5 MG tablet Take 2.5 mg by mouth Daily.     • metoprolol succinate XL (TOPROL-XL) 50 MG 24 hr tablet Take 1 tablet by mouth Daily. 90 tablet 4   • potassium chloride (K-DUR,KLOR-CON) 10 MEQ CR tablet TAKE 2 TABLETS DAILY 180 tablet 1   • rosuvastatin (CRESTOR) 20 MG tablet TAKE 1 TABLET BY MOUTH ONCE DAILY 90 tablet 3   • triamterene-hydrochlorothiazide (DYAZIDE) 37.5-25 MG per capsule Take 1 capsule by mouth Every Morning. 90 capsule 1     No facility-administered medications prior to visit.        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 Patient does not have an advance directive, information provided.    Review of Systems    Compared to one year ago, the patient feels her physical health is the same.  Compared to one year ago, the patient feels her mental health is the same.    Reviewed chart for potential of high risk medication in the elderly: yes  Reviewed chart for potential of harmful drug interactions in the elderly:yes    Objective         Vitals:    08/10/20 1358   BP: 130/70   Pulse: 71   Resp: 16  "  Temp: 98.6 °F (37 °C)   TempSrc: Infrared   SpO2: 98%   Weight: 64.4 kg (142 lb)   Height: 149.9 cm (59.02\")   PainSc: 0-No pain       Body mass index is 28.66 kg/m².  Discussed the patient's BMI with her. The BMI is above average; BMI management plan is completed.    Physical Exam          Assessment/Plan   Medicare Risks and Personalized Health Plan  CMS Preventative Services Quick Reference  Advance Directive Discussion  Fall Risk  Inactivity/Sedentary  Obesity/Overweight     The above risks/problems have been discussed with the patient.  Pertinent information has been shared with the patient in the After Visit Summary.  Follow up plans and orders are seen below in the Assessment/Plan Section.    There are no diagnoses linked to this encounter.  Follow Up:  No follow-ups on file.     An After Visit Summary and PPPS were given to the patient.             "

## 2020-08-10 NOTE — PATIENT INSTRUCTIONS
Fall Prevention in the Home, Adult  Falls can cause injuries and can affect people from all age groups. There are many simple things that you can do to make your home safe and to help prevent falls. Ask for help when making these changes, if needed.  What actions can I take to prevent falls?  General instructions  · Use good lighting in all rooms. Replace any light bulbs that burn out.  · Turn on lights if it is dark. Use night-lights.  · Place frequently used items in easy-to-reach places. Lower the shelves around your home if necessary.  · Set up furniture so that there are clear paths around it. Avoid moving your furniture around.  · Remove throw rugs and other tripping hazards from the floor.  · Avoid walking on wet floors.  · Fix any uneven floor surfaces.  · Add color or contrast paint or tape to grab bars and handrails in your home. Place contrasting color strips on the first and last steps of stairways.  · When you use a stepladder, make sure that it is completely opened and that the sides are firmly locked. Have someone hold the ladder while you are using it. Do not climb a closed stepladder.  · Be aware of any and all pets.  What can I do in the bathroom?         · Keep the floor dry. Immediately clean up any water that spills onto the floor.  · Remove soap buildup in the tub or shower on a regular basis.  · Use non-skid mats or decals on the floor of the tub or shower.  · Attach bath mats securely with double-sided, non-slip rug tape.  · If you need to sit down while you are in the shower, use a plastic, non-slip stool.  · Install grab bars by the toilet and in the tub and shower. Do not use towel bars as grab bars.  What can I do in the bedroom?  · Make sure that a bedside light is easy to reach.  · Do not use oversized bedding that drapes onto the floor.  · Have a firm chair that has side arms to use for getting dressed.  What can I do in the kitchen?  · Clean up any spills right away.  · If you need to  reach for something above you, use a sturdy step stool that has a grab bar.  · Keep electrical cables out of the way.  · Do not use floor polish or wax that makes floors slippery. If you must use wax, make sure that it is non-skid floor wax.  What can I do in the stairways?  · Do not leave any items on the stairs.  · Make sure that you have a light switch at the top of the stairs and the bottom of the stairs. Have them installed if you do not have them.  · Make sure that there are handrails on both sides of the stairs. Fix handrails that are broken or loose. Make sure that handrails are as long as the stairways.  · Install non-slip stair treads on all stairs in your home.  · Avoid having throw rugs at the top or bottom of stairways, or secure the rugs with carpet tape to prevent them from moving.  · Choose a carpet design that does not hide the edge of steps on the stairway.  · Check any carpeting to make sure that it is firmly attached to the stairs. Fix any carpet that is loose or worn.  What can I do on the outside of my home?  · Use bright outdoor lighting.  · Regularly repair the edges of walkways and driveways and fix any cracks.  · Remove high doorway thresholds.  · Trim any shrubbery on the main path into your home.  · Regularly check that handrails are securely fastened and in good repair. Both sides of any steps should have handrails.  · Install guardrails along the edges of any raised decks or porches.  · Clear walkways of debris and clutter, including tools and rocks.  · Have leaves, snow, and ice cleared regularly.  · Use sand or salt on walkways during winter months.  · In the garage, clean up any spills right away, including grease or oil spills.  What other actions can I take?  · Wear closed-toe shoes that fit well and support your feet. Wear shoes that have rubber soles or low heels.  · Use mobility aids as needed, such as canes, walkers, scooters, and crutches.  · Review your medicines with your  health care provider. Some medicines can cause dizziness or changes in blood pressure, which increase your risk of falling.  Talk with your health care provider about other ways that you can decrease your risk of falls. This may include working with a physical therapist or  to improve your strength, balance, and endurance.  Where to find more information  · Centers for Disease Control and Prevention, [place]: https://www.cdc.gov  · National [place] [employer] Aging: https://bo5kojs.namita.nih.gov  Contact a health care provider if:  · You are afraid of falling at home.  · You feel weak, drowsy, or dizzy at home.  · You fall at home.  Summary  · There are many simple things that you can do to make your home safe and to help prevent falls.  · Ways to make your home safe include removing tripping hazards and installing grab bars in the bathroom.  · Ask for help when making these changes in your home.  This information is not intended to replace advice given to you by your health care provider. Make sure you discuss any questions you have with your health care provider.  Document Released: 12/08/2003 Document Revised: 11/30/2018 Document Reviewed: 08/02/2018  [employer] Patient Education © 2020 [employer]  Exercising to Stay Healthy  To become healthy and stay healthy, it is recommended that you do moderate-intensity and vigorous-intensity exercise. You can tell that you are exercising at a moderate intensity if your heart starts beating faster and you start breathing faster but can still hold a conversation. You can tell that you are exercising at a vigorous intensity if you are breathing much harder and faster and cannot hold a conversation while exercising.  Exercising regularly is important. It has many health benefits, such as:  · Improving overall fitness, flexibility, and endurance.  · Increasing bone density.  · Helping with weight control.  · Decreasing body fat.  · Increasing muscle strength.  · Reducing stress  and tension.  · Improving overall health.  How often should I exercise?  Choose an activity that you enjoy, and set realistic goals. Your health care provider can help you make an activity plan that works for you.  Exercise regularly as told by your health care provider. This may include:  · Doing strength training two times a week, such as:  ? Lifting weights.  ? Using resistance bands.  ? Push-ups.  ? Sit-ups.  ? Yoga.  · Doing a certain intensity of exercise for a given amount of time. Choose from these options:  ? A total of 150 minutes of moderate-intensity exercise every week.  ? A total of 75 minutes of vigorous-intensity exercise every week.  ? A mix of moderate-intensity and vigorous-intensity exercise every week.  Children, pregnant women, people who have not exercised regularly, people who are overweight, and older adults may need to talk with a health care provider about what activities are safe to do. If you have a medical condition, be sure to talk with your health care provider before you start a new exercise program.  What are some exercise ideas?  Moderate-intensity exercise ideas include:  · Walking 1 mile (1.6 km) in about 15 minutes.  · Biking.  · Hiking.  · Golfing.  · Dancing.  · Water aerobics.  Vigorous-intensity exercise ideas include:  · Walking 4.5 miles (7.2 km) or more in about 1 hour.  · Jogging or running 5 miles (8 km) in about 1 hour.  · Biking 10 miles (16.1 km) or more in about 1 hour.  · Lap swimming.  · Roller-skating or in-line skating.  · Cross-country skiing.  · Vigorous competitive sports, such as football, basketball, and soccer.  · Jumping rope.  · Aerobic dancing.  What are some everyday activities that can help me to get exercise?  · Yard work, such as:  ? Pushing a .  ? Raking and bagging leaves.  · Washing your car.  · Pushing a stroller.  · Shoveling snow.  · Gardening.  · Washing windows or floors.  How can I be more active in my day-to-day  activities?  · Use stairs instead of an elevator.  · Take a walk during your lunch break.  · If you drive, park your car farther away from your work or school.  · If you take public transportation, get off one stop early and walk the rest of the way.  · Stand up or walk around during all of your indoor phone calls.  · Get up, stretch, and walk around every 30 minutes throughout the day.  · Enjoy exercise with a friend. Support to continue exercising will help you keep a regular routine of activity.  What guidelines can I follow while exercising?  · Before you start a new exercise program, talk with your health care provider.  · Do not exercise so much that you hurt yourself, feel dizzy, or get very short of breath.  · Wear comfortable clothes and wear shoes with good support.  · Drink plenty of water while you exercise to prevent dehydration or heat stroke.  · Work out until your breathing and your heartbeat get faster.  Where to find more information  · [place] Department of Health and Human Services: www.hhs.gov  · Centers for Disease Control and Prevention (CDC): www.cdc.gov  Summary  · Exercising regularly is important. It will improve your overall fitness, flexibility, and endurance.  · Regular exercise also will improve your overall health. It can help you control your weight, reduce stress, and improve your bone density.  · Do not exercise so much that you hurt yourself, feel dizzy, or get very short of breath.  · Before you start a new exercise program, talk with your health care provider.  This information is not intended to replace advice given to you by your health care provider. Make sure you discuss any questions you have with your health care provider.  Document Released: 01/20/2012 Document Revised: 11/30/2018 Document Reviewed: 11/08/2018  [employer] Patient Education © 2020 [employer]  Calorie Counting for Weight Loss  Calories are units of energy. Your body needs a certain amount of calories from  food to keep you going throughout the day. When you eat more calories than your body needs, your body stores the extra calories as fat. When you eat fewer calories than your body needs, your body burns fat to get the energy it needs.  Calorie counting means keeping track of how many calories you eat and drink each day. Calorie counting can be helpful if you need to lose weight. If you make sure to eat fewer calories than your body needs, you should lose weight. Ask your health care provider what a healthy weight is for you.  For calorie counting to work, you will need to eat the right number of calories in a day in order to lose a healthy amount of weight per week. A dietitian can help you determine how many calories you need in a day and will give you suggestions on how to reach your calorie goal.  · A healthy amount of weight to lose per week is usually 1-2 lb (0.5-0.9 kg). This usually means that your daily calorie intake should be reduced by 500-750 calories.  · Eating 1,200 - 1,500 calories per day can help most women lose weight.  · Eating 1,500 - 1,800 calories per day can help most men lose weight.  What is my plan?  My goal is to have __________ calories per day.  If I have this many calories per day, I should lose around __________ pounds per week.  What do I need to know about calorie counting?  In order to meet your daily calorie goal, you will need to:  · Find out how many calories are in each food you would like to eat. Try to do this before you eat.  · Decide how much of the food you plan to eat.  · Write down what you ate and how many calories it had. Doing this is called keeping a food log.  To successfully lose weight, it is important to balance calorie counting with a healthy lifestyle that includes regular activity. Aim for 150 minutes of moderate exercise (such as walking) or 75 minutes of vigorous exercise (such as running) each week.  Where do I find calorie information?    The number of  calories in a food can be found on a Nutrition Facts label. If a food does not have a Nutrition Facts label, try to look up the calories online or ask your dietitian for help.  Remember that calories are listed per serving. If you choose to have more than one serving of a food, you will have to multiply the calories per serving by the amount of servings you plan to eat. For example, the label on a package of bread might say that a serving size is 1 slice and that there are 90 calories in a serving. If you eat 1 slice, you will have eaten 90 calories. If you eat 2 slices, you will have eaten 180 calories.  How do I keep a food log?  Immediately after each meal, record the following information in your food log:  · What you ate. Don't forget to include toppings, sauces, and other extras on the food.  · How much you ate. This can be measured in cups, ounces, or number of items.  · How many calories each food and drink had.  · The total number of calories in the meal.  Keep your food log near you, such as in a small notebook in your pocket, or use a mobile zena or website. Some programs will calculate calories for you and show you how many calories you have left for the day to meet your goal.  What are some calorie counting tips?    · Use your calories on foods and drinks that will fill you up and not leave you hungry:  ? Some examples of foods that fill you up are nuts and nut butters, vegetables, lean proteins, and high-fiber foods like whole grains. High-fiber foods are foods with more than 5 g fiber per serving.  ? Drinks such as sodas, specialty coffee drinks, alcohol, and juices have a lot of calories, yet do not fill you up.  · Eat nutritious foods and avoid empty calories. Empty calories are calories you get from foods or beverages that do not have many vitamins or protein, such as candy, sweets, and soda. It is better to have a nutritious high-calorie food (such as an avocado) than a food with few nutrients  "(such as a bag of chips).  · Know how many calories are in the foods you eat most often. This will help you calculate calorie counts faster.  · Pay attention to calories in drinks. Low-calorie drinks include water and unsweetened drinks.  · Pay attention to nutrition labels for \"low fat\" or \"fat free\" foods. These foods sometimes have the same amount of calories or more calories than the full fat versions. They also often have added sugar, starch, or salt, to make up for flavor that was removed with the fat.  · Find a way of tracking calories that works for you. Get creative. Try different apps or programs if writing down calories does not work for you.  What are some portion control tips?  · Know how many calories are in a serving. This will help you know how many servings of a certain food you can have.  · Use a measuring cup to measure serving sizes. You could also try weighing out portions on a kitchen scale. With time, you will be able to estimate serving sizes for some foods.  · Take some time to put servings of different foods on your favorite plates, bowls, and cups so you know what a serving looks like.  · Try not to eat straight from a bag or box. Doing this can lead to overeating. Put the amount you would like to eat in a cup or on a plate to make sure you are eating the right portion.  · Use smaller plates, glasses, and bowls to prevent overeating.  · Try not to multitask (for example, watch TV or use your computer) while eating. If it is time to eat, sit down at a table and enjoy your food. This will help you to know when you are full. It will also help you to be aware of what you are eating and how much you are eating.  What are tips for following this plan?  Reading food labels  · Check the calorie count compared to the serving size. The serving size may be smaller than what you are used to eating.  · Check the source of the calories. Make sure the food you are eating is high in vitamins and protein " "and low in saturated and trans fats.  Shopping  · Read nutrition labels while you shop. This will help you make healthy decisions before you decide to purchase your food.  · Make a grocery list and stick to it.  Cooking  · Try to cook your favorite foods in a healthier way. For example, try baking instead of frying.  · Use low-fat dairy products.  Meal planning  · Use more fruits and vegetables. Half of your plate should be fruits and vegetables.  · Include lean proteins like poultry and fish.  How do I count calories when eating out?  · Ask for smaller portion sizes.  · Consider sharing an entree and sides instead of getting your own entree.  · If you get your own entree, eat only half. Ask for a box at the beginning of your meal and put the rest of your entree in it so you are not tempted to eat it.  · If calories are listed on the menu, choose the lower calorie options.  · Choose dishes that include vegetables, fruits, whole grains, low-fat dairy products, and lean protein.  · Choose items that are boiled, broiled, grilled, or steamed. Stay away from items that are buttered, battered, fried, or served with cream sauce. Items labeled \"crispy\" are usually fried, unless stated otherwise.  · Choose water, low-fat milk, unsweetened iced tea, or other drinks without added sugar. If you want an alcoholic beverage, choose a lower calorie option such as a glass of wine or light beer.  · Ask for dressings, sauces, and syrups on the side. These are usually high in calories, so you should limit the amount you eat.  · If you want a salad, choose a garden salad and ask for grilled meats. Avoid extra toppings like kennedy, cheese, or fried items. Ask for the dressing on the side, or ask for olive oil and vinegar or lemon to use as dressing.  · Estimate how many servings of a food you are given. For example, a serving of cooked rice is ½ cup or about the size of half a baseball. Knowing serving sizes will help you be aware of how " much food you are eating at restaurants. The list below tells you how big or small some common portion sizes are based on everyday objects:  ? 1 oz--4 stacked dice.  ? 3 oz--1 deck of cards.  ? 1 tsp--1 die.  ? 1 Tbsp--½ a ping-pong ball.  ? 2 Tbsp--1 ping-pong ball.  ? ½ cup--½ baseball.  ? 1 cup--1 baseball.  Summary  · Calorie counting means keeping track of how many calories you eat and drink each day. If you eat fewer calories than your body needs, you should lose weight.  · A healthy amount of weight to lose per week is usually 1-2 lb (0.5-0.9 kg). This usually means reducing your daily calorie intake by 500-750 calories.  · The number of calories in a food can be found on a Nutrition Facts label. If a food does not have a Nutrition Facts label, try to look up the calories online or ask your dietitian for help.  · Use your calories on foods and drinks that will fill you up, and not on foods and drinks that will leave you hungry.  · Use smaller plates, glasses, and bowls to prevent overeating.  This information is not intended to replace advice given to you by your health care provider. Make sure you discuss any questions you have with your health care provider.  Document Released: 12/18/2006 Document Revised: 09/06/2019 Document Reviewed: 11/17/2017  [employer] Patient Education © 2020 [employer]  BMI for Adults    Body mass index (BMI) is a number that is calculated from a person's weight and height. BMI may help to estimate how much of a person's weight is composed of fat. BMI can help identify those who may be at higher risk for certain medical problems.  How is BMI used with adults?  BMI is used as a screening tool to identify possible weight problems. It is used to check whether a person is obese, overweight, healthy weight, or underweight.  How is BMI calculated?  BMI measures your weight and compares it to your height. This can be done either in English ([place] or metric measurements. Note that charts  "are available to help you find your BMI quickly and easily without having to do these calculations yourself.  To calculate your BMI in English ([place] measurements, your health care provider will:  1. Measure your weight in pounds (lb).  2. Multiply the number of pounds by 703.  ? For example, for a person who weighs 180 lb, multiply that number by 703, which equals 126,540.  3. Measure your height in inches (in). Then multiply that number by itself to get a measurement called \"inches squared.\"  ? For example, for a person who is 70 in tall, the \"inches squared\" measurement is 70 in x 70 in, which equals 4900 inches squared.  4. Divide the total from Step 2 (number of lb x 703) by the total from Step 3 (inches squared): 126,540 ÷ 4900 = 25.8. This is your BMI.  To calculate your BMI in metric measurements, your health care provider will:  1. Measure your weight in kilograms (kg).  2. Measure your height in meters (m). Then multiply that number by itself to get a measurement called \"meters squared.\"  ? For example, for a person who is 1.75 m tall, the \"meters squared\" measurement is 1.75 m x 1.75 m, which is equal to 3.1 meters squared.  3. Divide the number of kilograms (your weight) by the meters squared number. In this example: 70 ÷ 3.1 = 22.6. This is your BMI.  How is BMI interpreted?  To interpret your results, your health care provider will use BMI charts to identify whether you are underweight, normal weight, overweight, or obese. The following guidelines will be used:  · Underweight: BMI less than 18.5.  · Normal weight: BMI between 18.5 and 24.9.  · Overweight: BMI between 25 and 29.9.  · Obese: BMI of 30 and above.  Please note:  · Weight includes both fat and muscle, so someone with a muscular build, such as an athlete, may have a BMI that is higher than 24.9. In cases like these, BMI is not an accurate measure of body fat.  · To determine if excess body fat is the cause of a BMI of 25 or higher, further " assessments may need to be done by a health care provider.  · BMI is usually interpreted in the same way for men and women.  Why is BMI a useful tool?  BMI is useful in two ways:  · Identifying a weight problem that may be related to a medical condition, or that may increase the risk for medical problems.  · Promoting lifestyle and diet changes in order to reach a healthy weight.  Summary  · Body mass index (BMI) is a number that is calculated from a person's weight and height.  · BMI may help to estimate how much of a person's weight is composed of fat. BMI can help identify those who may be at higher risk for certain medical problems.  · BMI can be measured using English measurements or metric measurements.  · To interpret your results, your health care provider will use BMI charts to identify whether you are underweight, normal weight, overweight, or obese.  This information is not intended to replace advice given to you by your health care provider. Make sure you discuss any questions you have with your health care provider.  Document Released: 08/29/2005 Document Revised: 11/30/2018 Document Reviewed: 10/31/2018  [employer] Patient Education © 2020 [employer]  Advance Directive    Advance directives are legal documents that let you make choices ahead of time about your health care and medical treatment in case you become unable to communicate for yourself. Advance directives are a way for you to communicate your wishes to family, friends, and health care providers. This can help convey your decisions about end-of-life care if you become unable to communicate.  Discussing and writing advance directives should happen over time rather than all at once. Advance directives can be changed depending on your situation and what you want, even after you have signed the advance directives.  If you do not have an advance directive, some states assign family decision makers to act on your behalf based on how closely you  are related to them. Each state has its own laws regarding advance directives. You may want to check with your health care provider, , or state representative about the laws in your state. There are different types of advance directives, such as:  · Medical power of .  · Living will.  · Do not resuscitate (DNR) or do not attempt resuscitation (DNAR) order.  Health care proxy and medical power of   A health care proxy, also called a health care agent, is a person who is appointed to make medical decisions for you in cases in which you are unable to make the decisions yourself. Generally, people choose someone they know well and trust to represent their preferences. Make sure to ask this person for an agreement to act as your proxy. A proxy may have to exercise judgment in the event of a medical decision for which your wishes are not known.  A medical power of  is a legal document that names your health care proxy. Depending on the laws in your state, after the document is written, it may also need to be:  · Signed.  · Notarized.  · Dated.  · Copied.  · Witnessed.  · Incorporated into your medical record.  You may also want to appoint someone to manage your financial affairs in a situation in which you are unable to do so. This is called a durable power of  for finances. It is a separate legal document from the durable power of  for health care. You may choose the same person or someone different from your health care proxy to act as your agent in financial matters.  If you do not appoint a proxy, or if there is a concern that the proxy is not acting in your best interests, a court-appointed guardian may be designated to act on your behalf.  Living will  A living will is a set of instructions documenting your wishes about medical care when you cannot express them yourself. Health care providers should keep a copy of your living will in your medical record. You may want  to give a copy to family members or friends. To alert caregivers in case of an emergency, you can place a card in your wallet to let them know that you have a living will and where they can find it. A living will is used if you become:  · Terminally ill.  · Incapacitated.  · Unable to communicate or make decisions.  Items to consider in your living will include:  · The use or non-use of life-sustaining equipment, such as dialysis machines and breathing machines (ventilators).  · A DNR or DNAR order, which is the instruction not to use cardiopulmonary resuscitation (CPR) if breathing or heartbeat stops.  · The use or non-use of tube feeding.  · Withholding of food and fluids.  · Comfort (palliative) care when the goal becomes comfort rather than a cure.  · Organ and tissue donation.  A living will does not give instructions for distributing your money and property if you should pass away. It is recommended that you seek the advice of a  when writing a will. Decisions about taxes, beneficiaries, and asset distribution will be legally binding. This process can relieve your family and friends of any concerns surrounding disputes or questions that may come up about the distribution of your assets.  DNR or DNAR  A DNR or DNAR order is a request not to have CPR in the event that your heart stops beating or you stop breathing. If a DNR or DNAR order has not been made and shared, a health care provider will try to help any patient whose heart has stopped or who has stopped breathing. If you plan to have surgery, talk with your health care provider about how your DNR or DNAR order will be followed if problems occur.  Summary  · Advance directives are the legal documents that allow you to make choices ahead of time about your health care and medical treatment in case you become unable to communicate for yourself.  · The process of discussing and writing advance directives should happen over time. You can change the  advance directives, even after you have signed them.  · Advance directives include DNR or DNAR orders, living sanchez, and designating an agent as your medical power of .  This information is not intended to replace advice given to you by your health care provider. Make sure you discuss any questions you have with your health care provider.  Document Released: 03/26/2009 Document Revised: 01/22/2020 Document Reviewed: 11/06/2017  [employer] Patient Education © 2020 [employer]

## 2020-08-10 NOTE — PROGRESS NOTES
Subjective   Cesilia Lutz is a 80 y.o. female.     Chief Complaint   Patient presents with   • Hypertension   • Hyperlipidemia   • Hyperglycemia   • Medicare Wellness-subsequent         In for recheck of chronic IGT.    Hypertension   This is a chronic problem. The current episode started more than 1 year ago. The problem is unchanged. The problem is controlled. Pertinent negatives include no chest pain, headaches, orthopnea, palpitations, peripheral edema, PND or shortness of breath. There are no associated agents to hypertension. Current antihypertension treatment includes beta blockers and diuretics. The current treatment provides significant improvement. There are no compliance problems.  There is no history of angina, kidney disease, CAD/MI, CVA or heart failure.   Hyperlipidemia   This is a chronic problem. The current episode started more than 1 year ago. The problem is controlled. Recent lipid tests were reviewed and are normal. Factors aggravating her hyperlipidemia include thiazides and beta blockers. Pertinent negatives include no chest pain or shortness of breath. Current antihyperlipidemic treatment includes statins. The current treatment provides significant improvement of lipids. There are no compliance problems.  There are no known risk factors for coronary artery disease.   Hyperglycemia   Pertinent negatives include no abdominal pain, chest pain, chills, coughing, fatigue, fever, headaches, nausea or vomiting.        The following portions of the patient's history were reviewed and updated as appropriate: allergies, current medications, past social history and problem list.    Outpatient Medications Marked as Taking for the 8/10/20 encounter (Office Visit) with Quinn Cabrera MD   Medication Sig Dispense Refill   • Cyanocobalamin (VITAMIN B 12 PO) Take 1 tablet by mouth Daily.     • estradiol 0.25 MG/0.25GM gel Place  on the skin as directed by provider Daily.     • medroxyPROGESTERone (PROVERA)  2.5 MG tablet Take 2.5 mg by mouth Daily.     • metoprolol succinate XL (TOPROL-XL) 50 MG 24 hr tablet Take 1 tablet by mouth Daily. 90 tablet 4   • potassium chloride (K-DUR,KLOR-CON) 10 MEQ CR tablet TAKE 2 TABLETS DAILY 180 tablet 1   • rosuvastatin (CRESTOR) 20 MG tablet TAKE 1 TABLET BY MOUTH ONCE DAILY 90 tablet 3   • triamterene-hydrochlorothiazide (DYAZIDE) 37.5-25 MG per capsule Take 1 capsule by mouth Every Morning. 90 capsule 1       Review of Systems   Constitutional: Negative for chills, fatigue and fever.   Respiratory: Negative for cough, shortness of breath and wheezing.    Cardiovascular: Negative for chest pain, palpitations, orthopnea, leg swelling and PND.   Gastrointestinal: Negative for abdominal pain, constipation, diarrhea, nausea and vomiting.   Neurological: Negative for headaches.       Objective   Vitals:    08/10/20 1358   BP: 130/70   Pulse: 71   Resp: 16   Temp: 98.6 °F (37 °C)   SpO2: 98%          08/10/20  1358   Weight: 64.4 kg (142 lb)    [unfilled]  Body mass index is 28.66 kg/m².      Physical Exam   Constitutional: She appears well-developed and well-nourished. No distress.   HENT:   Head: Normocephalic and atraumatic.   Neck: Carotid bruit is not present. No thyromegaly present.   Cardiovascular: Normal rate, regular rhythm and intact distal pulses.  No extrasystoles are present. Exam reveals no gallop.   Murmur heard.   Crescendo decrescendo systolic murmur is present with a grade of 2/6.  Aortic area   Pulmonary/Chest: Effort normal and breath sounds normal. No respiratory distress. She has no wheezes. She has no rales.   Abdominal: Soft. Bowel sounds are normal. She exhibits no mass. There is no tenderness. There is no guarding.         Problem List Items Addressed This Visit        Cardiovascular and Mediastinum    Hyperlipidemia    Hypertension - Primary       Endocrine    Impaired glucose tolerance        Assessment/Plan   In for recheck of hypertension,  hyperlipidemia, and IGT.  Annual lab work today August 2020 including CBC, CMP, lipids, A1c, UA.  Gets glucose, A1c, and lipids every 6 months.  Feels great.  Tolerating medications well.  Very active.  Walks regularly.  No changes are made today.  AWV today August 2020.  She is 2.5H PP glass of milk.         Dragon disclaimer:   Much of this encounter note is an electronic transcription/translation of spoken language to printed text. The electronic translation of spoken language may permit erroneous, or at times, nonsensical words or phrases to be inadvertently transcribed; Although I have reviewed the note for such errors, some may still exist.

## 2020-08-11 LAB
ALBUMIN SERPL-MCNC: 4.1 G/DL (ref 3.6–4.6)
ALBUMIN/GLOB SERPL: 1.5 {RATIO} (ref 1.2–2.2)
ALP SERPL-CCNC: 62 IU/L (ref 39–117)
ALT SERPL-CCNC: 16 IU/L (ref 0–32)
AST SERPL-CCNC: 25 IU/L (ref 0–40)
BASOPHILS # BLD AUTO: 0.1 X10E3/UL (ref 0–0.2)
BASOPHILS NFR BLD AUTO: 1 %
BILIRUB SERPL-MCNC: 1.2 MG/DL (ref 0–1.2)
BUN SERPL-MCNC: 21 MG/DL (ref 8–27)
BUN/CREAT SERPL: 18 (ref 12–28)
CALCIUM SERPL-MCNC: 10.8 MG/DL (ref 8.7–10.3)
CHLORIDE SERPL-SCNC: 98 MMOL/L (ref 96–106)
CHOLEST SERPL-MCNC: 177 MG/DL (ref 100–199)
CHOLEST/HDLC SERPL: 4.5 RATIO (ref 0–4.4)
CO2 SERPL-SCNC: 25 MMOL/L (ref 20–29)
CREAT SERPL-MCNC: 1.14 MG/DL (ref 0.57–1)
EOSINOPHIL # BLD AUTO: 0.3 X10E3/UL (ref 0–0.4)
EOSINOPHIL NFR BLD AUTO: 3 %
ERYTHROCYTE [DISTWIDTH] IN BLOOD BY AUTOMATED COUNT: 12.4 % (ref 11.7–15.4)
GLOBULIN SER CALC-MCNC: 2.8 G/DL (ref 1.5–4.5)
GLUCOSE SERPL-MCNC: 133 MG/DL (ref 65–99)
HBA1C MFR BLD: 6.7 % (ref 4.8–5.6)
HCT VFR BLD AUTO: 40.8 % (ref 34–46.6)
HDLC SERPL-MCNC: 39 MG/DL
HGB BLD-MCNC: 13.8 G/DL (ref 11.1–15.9)
IMM GRANULOCYTES # BLD AUTO: 0 X10E3/UL (ref 0–0.1)
IMM GRANULOCYTES NFR BLD AUTO: 0 %
LDLC SERPL CALC-MCNC: 89 MG/DL (ref 0–99)
LYMPHOCYTES # BLD AUTO: 1.8 X10E3/UL (ref 0.7–3.1)
LYMPHOCYTES NFR BLD AUTO: 21 %
MCH RBC QN AUTO: 31.8 PG (ref 26.6–33)
MCHC RBC AUTO-ENTMCNC: 33.8 G/DL (ref 31.5–35.7)
MCV RBC AUTO: 94 FL (ref 79–97)
MONOCYTES # BLD AUTO: 0.6 X10E3/UL (ref 0.1–0.9)
MONOCYTES NFR BLD AUTO: 7 %
NEUTROPHILS # BLD AUTO: 5.8 X10E3/UL (ref 1.4–7)
NEUTROPHILS NFR BLD AUTO: 68 %
PLATELET # BLD AUTO: 332 X10E3/UL (ref 150–450)
POTASSIUM SERPL-SCNC: 3.4 MMOL/L (ref 3.5–5.2)
PROT SERPL-MCNC: 6.9 G/DL (ref 6–8.5)
RBC # BLD AUTO: 4.34 X10E6/UL (ref 3.77–5.28)
SODIUM SERPL-SCNC: 137 MMOL/L (ref 134–144)
TRIGL SERPL-MCNC: 246 MG/DL (ref 0–149)
VLDLC SERPL CALC-MCNC: 49 MG/DL (ref 5–40)
WBC # BLD AUTO: 8.6 X10E3/UL (ref 3.4–10.8)

## 2020-08-11 RX ORDER — POTASSIUM CHLORIDE 750 MG/1
20 TABLET, EXTENDED RELEASE ORAL DAILY
Qty: 180 TABLET | Refills: 1 | Status: SHIPPED | OUTPATIENT
Start: 2020-08-11 | End: 2021-07-15

## 2020-12-11 RX ORDER — TRIAMTERENE AND HYDROCHLOROTHIAZIDE 37.5; 25 MG/1; MG/1
CAPSULE ORAL
Qty: 90 CAPSULE | Refills: 1 | Status: SHIPPED | OUTPATIENT
Start: 2020-12-11 | End: 2021-09-28

## 2021-05-06 RX ORDER — ROSUVASTATIN CALCIUM 20 MG/1
TABLET, COATED ORAL
Qty: 90 TABLET | Refills: 1 | Status: SHIPPED | OUTPATIENT
Start: 2021-05-06

## 2021-07-02 ENCOUNTER — OFFICE VISIT (OUTPATIENT)
Dept: INTERNAL MEDICINE | Facility: CLINIC | Age: 81
End: 2021-07-02

## 2021-07-02 VITALS
TEMPERATURE: 96.6 F | HEART RATE: 78 BPM | BODY MASS INDEX: 27.42 KG/M2 | RESPIRATION RATE: 18 BRPM | SYSTOLIC BLOOD PRESSURE: 124 MMHG | OXYGEN SATURATION: 98 % | HEIGHT: 59 IN | WEIGHT: 136 LBS | DIASTOLIC BLOOD PRESSURE: 70 MMHG

## 2021-07-02 DIAGNOSIS — E78.5 HYPERLIPIDEMIA, UNSPECIFIED HYPERLIPIDEMIA TYPE: ICD-10-CM

## 2021-07-02 DIAGNOSIS — I10 ESSENTIAL HYPERTENSION: Primary | ICD-10-CM

## 2021-07-02 DIAGNOSIS — R73.02 IMPAIRED GLUCOSE TOLERANCE: ICD-10-CM

## 2021-07-02 PROCEDURE — 99214 OFFICE O/P EST MOD 30 MIN: CPT | Performed by: INTERNAL MEDICINE

## 2021-07-02 NOTE — PROGRESS NOTES
Subjective   Cesilia Lutz is a 81 y.o. female.     Chief Complaint   Patient presents with   • Hypertension   • Hyperlipidemia         In for recheck of chronic IGT.    Hypertension  This is a chronic problem. The current episode started more than 1 year ago. The problem is unchanged. The problem is controlled. Pertinent negatives include no chest pain, headaches, orthopnea, palpitations, peripheral edema, PND or shortness of breath. There are no associated agents to hypertension. Current antihypertension treatment includes beta blockers and diuretics. The current treatment provides significant improvement. There are no compliance problems.  There is no history of angina, kidney disease, CAD/MI, CVA or heart failure.   Hyperlipidemia  This is a chronic problem. The current episode started more than 1 year ago. The problem is controlled. Recent lipid tests were reviewed and are normal. Factors aggravating her hyperlipidemia include thiazides and beta blockers. Pertinent negatives include no chest pain or shortness of breath. Current antihyperlipidemic treatment includes statins. The current treatment provides significant improvement of lipids. There are no compliance problems.  There are no known risk factors for coronary artery disease.   Hyperglycemia  This is a chronic problem. The current episode started more than 1 year ago. Pertinent negatives include no abdominal pain, chest pain, coughing or headaches.        The following portions of the patient's history were reviewed and updated as appropriate: allergies, current medications, past social history and problem list.    Outpatient Medications Marked as Taking for the 7/2/21 encounter (Office Visit) with Quinn Cabrera MD   Medication Sig Dispense Refill   • Cyanocobalamin (VITAMIN B 12 PO) Take 1 tablet by mouth Daily.     • estradiol 0.25 MG/0.25GM gel Place  on the skin as directed by provider Daily.     • medroxyPROGESTERone (PROVERA) 2.5 MG tablet Take  2.5 mg by mouth Daily.     • metoprolol succinate XL (TOPROL-XL) 50 MG 24 hr tablet Take 1 tablet by mouth Daily. 90 tablet 4   • potassium chloride (K-DUR,KLOR-CON) 10 MEQ CR tablet Take 2 tablets by mouth Daily. 180 tablet 1   • rosuvastatin (CRESTOR) 20 MG tablet Take 1 tablet by mouth once daily 90 tablet 1   • triamterene-hydrochlorothiazide (DYAZIDE) 37.5-25 MG per capsule Take 1 capsule by mouth once daily in the morning 90 capsule 1       Review of Systems   Respiratory: Negative for cough, shortness of breath and wheezing.    Cardiovascular: Negative for chest pain, palpitations, orthopnea, leg swelling and PND.   Gastrointestinal: Negative for abdominal pain, constipation and diarrhea.   Neurological: Negative for headaches.       Objective   Vitals:    07/02/21 1036   BP: 124/70   Pulse: 78   Resp: 18   Temp: 96.6 °F (35.9 °C)   SpO2: 98%          07/02/21  1036   Weight: 61.7 kg (136 lb)    [unfilled]  Body mass index is 27.47 kg/m².      Physical Exam   Constitutional: She appears well-developed.   Neck: No thyromegaly present.   Cardiovascular: Normal rate and regular rhythm.  No extrasystoles are present. Exam reveals no gallop.   Murmur heard.   Crescendo decrescendo systolic murmur is present with a grade of 2/6.  Aortic area   Pulmonary/Chest: Effort normal and breath sounds normal. No respiratory distress. She has no wheezes. She has no rales.   Abdominal: Soft. Normal appearance and bowel sounds are normal. She exhibits no mass. There is no abdominal tenderness. There is no guarding.   Neurological: She is alert.         Problems Addressed this Visit        Cardiac and Vasculature    Hypertension - Primary    Hyperlipidemia    Relevant Orders    Lipid Panel With / Chol / HDL Ratio       Endocrine and Metabolic    Impaired glucose tolerance    Relevant Orders    Hemoglobin A1c    Glucose, Random      Diagnoses       Codes Comments    Essential hypertension    -  Primary ICD-10-CM:  I10  ICD-9-CM: 401.9     Hyperlipidemia, unspecified hyperlipidemia type     ICD-10-CM: E78.5  ICD-9-CM: 272.4     Impaired glucose tolerance     ICD-10-CM: R73.02  ICD-9-CM: 790.22         Assessment/Plan   In for recheck of hypertension, hyperlipidemia, and IGT.  Annual lab work today August 2020 including CBC, CMP, lipids, A1c, UA.  Gets glucose, A1c, and lipids every 6 months.  Next visit will be 3 months since she missed her last visit.  Annual preventive exam and annual wellness visit October 2021.  Feels great.  Tolerating medications well.  Very active.  Walks regularly.  No changes are made today.  She is fasting today.    The above information was reviewed again today 07/02/21.  It continues to be accurate as reflected above and is unchanged.  History, physical and review of systems all reviewed and are unchanged.  Medications were reviewed today and continue the current dosing.    PPE today includes face mask and eye shield.           Dragon disclaimer:   Much of this encounter note is an electronic transcription/translation of spoken language to printed text. The electronic translation of spoken language may permit erroneous, or at times, nonsensical words or phrases to be inadvertently transcribed; Although I have reviewed the note for such errors, some may still exist.

## 2021-07-03 LAB
CHOLEST SERPL-MCNC: 209 MG/DL (ref 0–200)
CHOLEST/HDLC SERPL: 5.5 {RATIO}
GLUCOSE SERPL-MCNC: 127 MG/DL (ref 65–99)
HBA1C MFR BLD: 6.3 % (ref 4.8–5.6)
HDLC SERPL-MCNC: 38 MG/DL (ref 40–60)
LDLC SERPL CALC-MCNC: 138 MG/DL (ref 0–100)
TRIGL SERPL-MCNC: 185 MG/DL (ref 0–150)
VLDLC SERPL CALC-MCNC: 33 MG/DL (ref 5–40)

## 2021-07-13 ENCOUNTER — IMMUNIZATION (OUTPATIENT)
Dept: VACCINE CLINIC | Facility: HOSPITAL | Age: 81
End: 2021-07-13

## 2021-07-13 PROCEDURE — 91300 HC SARSCOV02 VAC 30MCG/0.3ML IM: CPT | Performed by: INTERNAL MEDICINE

## 2021-07-13 PROCEDURE — 0001A: CPT | Performed by: INTERNAL MEDICINE

## 2021-07-15 RX ORDER — POTASSIUM CHLORIDE 750 MG/1
TABLET, EXTENDED RELEASE ORAL
Qty: 180 TABLET | Refills: 0 | Status: SHIPPED | OUTPATIENT
Start: 2021-07-15

## 2021-07-15 RX ORDER — METOPROLOL SUCCINATE 50 MG/1
TABLET, EXTENDED RELEASE ORAL
Qty: 90 TABLET | Refills: 0 | Status: SHIPPED | OUTPATIENT
Start: 2021-07-15

## 2021-08-03 ENCOUNTER — IMMUNIZATION (OUTPATIENT)
Dept: VACCINE CLINIC | Facility: HOSPITAL | Age: 81
End: 2021-08-03

## 2021-08-03 PROCEDURE — 0002A: CPT | Performed by: INTERNAL MEDICINE

## 2021-08-03 PROCEDURE — 91300 HC SARSCOV02 VAC 30MCG/0.3ML IM: CPT | Performed by: INTERNAL MEDICINE

## 2021-09-28 RX ORDER — TRIAMTERENE AND HYDROCHLOROTHIAZIDE 37.5; 25 MG/1; MG/1
CAPSULE ORAL
Qty: 90 CAPSULE | Refills: 0 | Status: SHIPPED | OUTPATIENT
Start: 2021-09-28

## 2022-01-01 ENCOUNTER — OFFICE VISIT (OUTPATIENT)
Dept: INTERNAL MEDICINE | Facility: CLINIC | Age: 82
End: 2022-01-01

## 2022-01-01 ENCOUNTER — PATIENT ROUNDING (BHMG ONLY) (OUTPATIENT)
Dept: INTERNAL MEDICINE | Facility: CLINIC | Age: 82
End: 2022-01-01

## 2022-01-01 ENCOUNTER — APPOINTMENT (OUTPATIENT)
Dept: MRI IMAGING | Facility: HOSPITAL | Age: 82
End: 2022-01-01

## 2022-01-01 ENCOUNTER — APPOINTMENT (OUTPATIENT)
Dept: CARDIOLOGY | Facility: HOSPITAL | Age: 82
End: 2022-01-01

## 2022-01-01 ENCOUNTER — TELEPHONE (OUTPATIENT)
Dept: INTERNAL MEDICINE | Facility: CLINIC | Age: 82
End: 2022-01-01

## 2022-01-01 ENCOUNTER — READMISSION MANAGEMENT (OUTPATIENT)
Dept: CALL CENTER | Facility: HOSPITAL | Age: 82
End: 2022-01-01

## 2022-01-01 ENCOUNTER — HOSPITAL ENCOUNTER (OUTPATIENT)
Dept: CARDIOLOGY | Facility: HOSPITAL | Age: 82
Discharge: HOME | End: 2022-10-14
Admitting: INTERNAL MEDICINE

## 2022-01-01 ENCOUNTER — APPOINTMENT (OUTPATIENT)
Dept: CT IMAGING | Facility: HOSPITAL | Age: 82
End: 2022-01-01

## 2022-01-01 ENCOUNTER — APPOINTMENT (OUTPATIENT)
Dept: GENERAL RADIOLOGY | Facility: HOSPITAL | Age: 82
End: 2022-01-01

## 2022-01-01 ENCOUNTER — HOSPITAL ENCOUNTER (INPATIENT)
Facility: HOSPITAL | Age: 82
LOS: 4 days | Discharge: NURSING, REHAB OR LONG-TERM CARE | End: 2022-11-11
Attending: EMERGENCY MEDICINE | Admitting: INTERNAL MEDICINE

## 2022-01-01 ENCOUNTER — APPOINTMENT (OUTPATIENT)
Dept: NEUROLOGY | Facility: HOSPITAL | Age: 82
End: 2022-01-01

## 2022-01-01 VITALS
TEMPERATURE: 97.2 F | RESPIRATION RATE: 18 BRPM | HEART RATE: 67 BPM | BODY MASS INDEX: 22.98 KG/M2 | HEIGHT: 59 IN | OXYGEN SATURATION: 94 % | WEIGHT: 113.98 LBS | SYSTOLIC BLOOD PRESSURE: 101 MMHG | DIASTOLIC BLOOD PRESSURE: 56 MMHG

## 2022-01-01 VITALS
DIASTOLIC BLOOD PRESSURE: 81 MMHG | HEIGHT: 59 IN | SYSTOLIC BLOOD PRESSURE: 170 MMHG | WEIGHT: 114 LBS | BODY MASS INDEX: 22.98 KG/M2

## 2022-01-01 VITALS
TEMPERATURE: 97.9 F | RESPIRATION RATE: 16 BRPM | OXYGEN SATURATION: 98 % | DIASTOLIC BLOOD PRESSURE: 64 MMHG | SYSTOLIC BLOOD PRESSURE: 110 MMHG | WEIGHT: 114 LBS | BODY MASS INDEX: 22.98 KG/M2 | HEIGHT: 59 IN | HEART RATE: 83 BPM

## 2022-01-01 VITALS
SYSTOLIC BLOOD PRESSURE: 120 MMHG | DIASTOLIC BLOOD PRESSURE: 82 MMHG | WEIGHT: 117.5 LBS | TEMPERATURE: 96.3 F | BODY MASS INDEX: 23.69 KG/M2 | OXYGEN SATURATION: 99 % | RESPIRATION RATE: 16 BRPM | HEIGHT: 59 IN | HEART RATE: 69 BPM

## 2022-01-01 DIAGNOSIS — Z12.31 SCREENING MAMMOGRAM, ENCOUNTER FOR: ICD-10-CM

## 2022-01-01 DIAGNOSIS — I35.8 HEART MURMUR, AORTIC: ICD-10-CM

## 2022-01-01 DIAGNOSIS — R73.02 IMPAIRED GLUCOSE TOLERANCE: ICD-10-CM

## 2022-01-01 DIAGNOSIS — E78.5 HYPERLIPIDEMIA, UNSPECIFIED HYPERLIPIDEMIA TYPE: ICD-10-CM

## 2022-01-01 DIAGNOSIS — R44.1 VISUAL HALLUCINATION: Primary | ICD-10-CM

## 2022-01-01 DIAGNOSIS — N39.0 ACUTE UTI: ICD-10-CM

## 2022-01-01 DIAGNOSIS — I10 PRIMARY HYPERTENSION: Primary | ICD-10-CM

## 2022-01-01 DIAGNOSIS — R93.0 ABNORMAL HEAD CT: ICD-10-CM

## 2022-01-01 DIAGNOSIS — Z79.899 MEDICATION MANAGEMENT: ICD-10-CM

## 2022-01-01 LAB
25(OH)D3 SERPL-MCNC: 18.2 NG/ML (ref 30–100)
ALBUMIN SERPL-MCNC: 3.8 G/DL (ref 3.5–5.2)
ALBUMIN SERPL-MCNC: 3.9 G/DL (ref 3.5–5.2)
ALBUMIN SERPL-MCNC: 4.1 G/DL (ref 3.5–5.2)
ALBUMIN SERPL-MCNC: 4.1 G/DL (ref 3.6–4.6)
ALBUMIN/GLOB SERPL: 1.4 G/DL
ALBUMIN/GLOB SERPL: 1.4 {RATIO} (ref 1.2–2.2)
ALBUMIN/GLOB SERPL: 1.5 G/DL
ALBUMIN/GLOB SERPL: 1.6 G/DL
ALP SERPL-CCNC: 108 U/L (ref 39–117)
ALP SERPL-CCNC: 87 U/L (ref 39–117)
ALP SERPL-CCNC: 89 U/L (ref 39–117)
ALP SERPL-CCNC: 98 IU/L (ref 44–121)
ALT SERPL W P-5'-P-CCNC: 13 U/L (ref 1–33)
ALT SERPL W P-5'-P-CCNC: 18 U/L (ref 1–33)
ALT SERPL W P-5'-P-CCNC: 19 U/L (ref 1–33)
ALT SERPL-CCNC: 12 IU/L (ref 0–32)
ANION GAP SERPL CALCULATED.3IONS-SCNC: 13.6 MMOL/L (ref 5–15)
ANION GAP SERPL CALCULATED.3IONS-SCNC: 16 MMOL/L (ref 5–15)
ANION GAP SERPL CALCULATED.3IONS-SCNC: 16.4 MMOL/L (ref 5–15)
AORTIC DIMENSIONLESS INDEX: 0.3 (DI)
AST SERPL-CCNC: 15 IU/L (ref 0–40)
AST SERPL-CCNC: 23 U/L (ref 1–32)
AST SERPL-CCNC: 35 U/L (ref 1–32)
AST SERPL-CCNC: 36 U/L (ref 1–32)
BACTERIA SPEC AEROBE CULT: NORMAL
BACTERIA UR QL AUTO: ABNORMAL /HPF
BASOPHILS # BLD AUTO: 0.04 10*3/MM3 (ref 0–0.2)
BASOPHILS # BLD AUTO: 0.07 10*3/MM3 (ref 0–0.2)
BASOPHILS # BLD AUTO: 0.08 10*3/MM3 (ref 0–0.2)
BASOPHILS # BLD AUTO: 0.1 X10E3/UL (ref 0–0.2)
BASOPHILS NFR BLD AUTO: 0.4 % (ref 0–1.5)
BASOPHILS NFR BLD AUTO: 0.8 % (ref 0–1.5)
BASOPHILS NFR BLD AUTO: 1 %
BASOPHILS NFR BLD AUTO: 1.3 % (ref 0–1.5)
BH CV ECHO MEAS - AO MAX PG: 32.5 MMHG
BH CV ECHO MEAS - AO MEAN PG: 17.9 MMHG
BH CV ECHO MEAS - AO ROOT DIAM: 2.48 CM
BH CV ECHO MEAS - AO V2 MAX: 285.3 CM/SEC
BH CV ECHO MEAS - AO V2 VTI: 58.8 CM
BH CV ECHO MEAS - AVA(I,D): 0.91 CM2
BH CV ECHO MEAS - EDV(CUBED): 62.7 ML
BH CV ECHO MEAS - EDV(MOD-SP2): 58 ML
BH CV ECHO MEAS - EDV(MOD-SP4): 55 ML
BH CV ECHO MEAS - EF(MOD-BP): 62 %
BH CV ECHO MEAS - EF(MOD-SP2): 63.8 %
BH CV ECHO MEAS - EF(MOD-SP4): 60 %
BH CV ECHO MEAS - ESV(CUBED): 6.5 ML
BH CV ECHO MEAS - ESV(MOD-SP2): 21 ML
BH CV ECHO MEAS - ESV(MOD-SP4): 22 ML
BH CV ECHO MEAS - FS: 53.1 %
BH CV ECHO MEAS - IVS/LVPW: 0.92 CM
BH CV ECHO MEAS - IVSD: 0.87 CM
BH CV ECHO MEAS - LAT PEAK E' VEL: 6.4 CM/SEC
BH CV ECHO MEAS - LV DIASTOLIC VOL/BSA (35-75): 37.9 CM2
BH CV ECHO MEAS - LV MASS(C)D: 109.8 GRAMS
BH CV ECHO MEAS - LV MAX PG: 3.5 MMHG
BH CV ECHO MEAS - LV MEAN PG: 1.79 MMHG
BH CV ECHO MEAS - LV SYSTOLIC VOL/BSA (12-30): 15.2 CM2
BH CV ECHO MEAS - LV V1 MAX: 93 CM/SEC
BH CV ECHO MEAS - LV V1 VTI: 20.5 CM
BH CV ECHO MEAS - LVIDD: 4 CM
BH CV ECHO MEAS - LVIDS: 1.86 CM
BH CV ECHO MEAS - LVOT AREA: 2.6 CM2
BH CV ECHO MEAS - LVOT DIAM: 1.82 CM
BH CV ECHO MEAS - LVPWD: 0.94 CM
BH CV ECHO MEAS - MED PEAK E' VEL: 4.9 CM/SEC
BH CV ECHO MEAS - MR MAX PG: 54.2 MMHG
BH CV ECHO MEAS - MR MAX VEL: 368 CM/SEC
BH CV ECHO MEAS - MV A DUR: 0.09 SEC
BH CV ECHO MEAS - MV A MAX VEL: 129.6 CM/SEC
BH CV ECHO MEAS - MV DEC SLOPE: 310.7 CM/SEC2
BH CV ECHO MEAS - MV DEC TIME: 0.3 MSEC
BH CV ECHO MEAS - MV E MAX VEL: 92.4 CM/SEC
BH CV ECHO MEAS - MV E/A: 0.71
BH CV ECHO MEAS - MV MAX PG: 6.6 MMHG
BH CV ECHO MEAS - MV MEAN PG: 2.25 MMHG
BH CV ECHO MEAS - MV P1/2T: 89.6 MSEC
BH CV ECHO MEAS - MV V2 VTI: 31.6 CM
BH CV ECHO MEAS - MVA(P1/2T): 2.46 CM2
BH CV ECHO MEAS - MVA(VTI): 1.7 CM2
BH CV ECHO MEAS - PA ACC TIME: 0.16 SEC
BH CV ECHO MEAS - PA PR(ACCEL): 5.3 MMHG
BH CV ECHO MEAS - PA V2 MAX: 93.5 CM/SEC
BH CV ECHO MEAS - PI END-D VEL: 70 CM/SEC
BH CV ECHO MEAS - PULM A REVS DUR: 0.09 SEC
BH CV ECHO MEAS - PULM A REVS VEL: 29.5 CM/SEC
BH CV ECHO MEAS - PULM DIAS VEL: 58 CM/SEC
BH CV ECHO MEAS - PULM S/D: 0.99
BH CV ECHO MEAS - PULM SYS VEL: 57.4 CM/SEC
BH CV ECHO MEAS - RAP SYSTOLE: 3 MMHG
BH CV ECHO MEAS - RVSP: 28.7 MMHG
BH CV ECHO MEAS - SI(MOD-SP2): 25.5 ML/M2
BH CV ECHO MEAS - SI(MOD-SP4): 22.7 ML/M2
BH CV ECHO MEAS - SV(LVOT): 53.5 ML
BH CV ECHO MEAS - SV(MOD-SP2): 37 ML
BH CV ECHO MEAS - SV(MOD-SP4): 33 ML
BH CV ECHO MEAS - TAPSE (>1.6): 2.3 CM
BH CV ECHO MEAS - TR MAX PG: 25.7 MMHG
BH CV ECHO MEAS - TR MAX VEL: 253.4 CM/SEC
BH CV ECHO MEASUREMENTS AVERAGE E/E' RATIO: 16.35
BH CV XLRA - RV BASE: 3.3 CM
BH CV XLRA - RV LENGTH: 6.1 CM
BH CV XLRA - RV MID: 2.7 CM
BH CV XLRA - TDI S': 11 CM/SEC
BH CV XLRA MEAS LEFT DIST CCA EDV: -14.7 CM/SEC
BH CV XLRA MEAS LEFT DIST CCA PSV: -59.2 CM/SEC
BH CV XLRA MEAS LEFT DIST ICA EDV: -22.4 CM/SEC
BH CV XLRA MEAS LEFT DIST ICA PSV: -66.7 CM/SEC
BH CV XLRA MEAS LEFT ICA/CCA RATIO: 1.13
BH CV XLRA MEAS LEFT MID ICA EDV: -18.1 CM/SEC
BH CV XLRA MEAS LEFT MID ICA PSV: -59.5 CM/SEC
BH CV XLRA MEAS LEFT PROX CCA EDV: 13.8 CM/SEC
BH CV XLRA MEAS LEFT PROX CCA PSV: 68.3 CM/SEC
BH CV XLRA MEAS LEFT PROX ECA EDV: -4.2 CM/SEC
BH CV XLRA MEAS LEFT PROX ECA PSV: -57.1 CM/SEC
BH CV XLRA MEAS LEFT PROX ICA EDV: 15.1 CM/SEC
BH CV XLRA MEAS LEFT PROX ICA PSV: 56 CM/SEC
BH CV XLRA MEAS LEFT PROX SCLA PSV: 147.3 CM/SEC
BH CV XLRA MEAS LEFT VERTEBRAL A EDV: -6.3 CM/SEC
BH CV XLRA MEAS LEFT VERTEBRAL A PSV: -39.2 CM/SEC
BH CV XLRA MEAS RIGHT DIST CCA EDV: 11.3 CM/SEC
BH CV XLRA MEAS RIGHT DIST CCA PSV: 49.5 CM/SEC
BH CV XLRA MEAS RIGHT DIST ICA EDV: -20.1 CM/SEC
BH CV XLRA MEAS RIGHT DIST ICA PSV: -80.6 CM/SEC
BH CV XLRA MEAS RIGHT ICA/CCA RATIO: 1.63
BH CV XLRA MEAS RIGHT MID ICA EDV: -19.2 CM/SEC
BH CV XLRA MEAS RIGHT MID ICA PSV: -65.9 CM/SEC
BH CV XLRA MEAS RIGHT PROX CCA EDV: -10 CM/SEC
BH CV XLRA MEAS RIGHT PROX CCA PSV: -47.7 CM/SEC
BH CV XLRA MEAS RIGHT PROX ECA EDV: -4 CM/SEC
BH CV XLRA MEAS RIGHT PROX ECA PSV: -72.4 CM/SEC
BH CV XLRA MEAS RIGHT PROX ICA EDV: 11 CM/SEC
BH CV XLRA MEAS RIGHT PROX ICA PSV: 49 CM/SEC
BH CV XLRA MEAS RIGHT PROX SCLA PSV: 70.1 CM/SEC
BH CV XLRA MEAS RIGHT VERTEBRAL A EDV: -14.8 CM/SEC
BH CV XLRA MEAS RIGHT VERTEBRAL A PSV: -73 CM/SEC
BILIRUB SERPL-MCNC: 0.7 MG/DL (ref 0–1.2)
BILIRUB SERPL-MCNC: 0.8 MG/DL (ref 0–1.2)
BILIRUB SERPL-MCNC: 1.2 MG/DL (ref 0–1.2)
BILIRUB SERPL-MCNC: 1.2 MG/DL (ref 0–1.2)
BILIRUB UR QL STRIP: NEGATIVE
BUN SERPL-MCNC: 20 MG/DL (ref 8–23)
BUN SERPL-MCNC: 23 MG/DL (ref 8–27)
BUN SERPL-MCNC: 28 MG/DL (ref 8–23)
BUN SERPL-MCNC: 29 MG/DL (ref 8–23)
BUN/CREAT SERPL: 22 (ref 7–25)
BUN/CREAT SERPL: 23 (ref 12–28)
BUN/CREAT SERPL: 29.2 (ref 7–25)
BUN/CREAT SERPL: 30.2 (ref 7–25)
CALCIUM SERPL-MCNC: 10.1 MG/DL (ref 8.7–10.3)
CALCIUM SPEC-SCNC: 10.7 MG/DL (ref 8.6–10.5)
CALCIUM SPEC-SCNC: 9.6 MG/DL (ref 8.6–10.5)
CALCIUM SPEC-SCNC: 9.7 MG/DL (ref 8.6–10.5)
CHLORIDE SERPL-SCNC: 103 MMOL/L (ref 96–106)
CHLORIDE SERPL-SCNC: 103 MMOL/L (ref 98–107)
CHLORIDE SERPL-SCNC: 106 MMOL/L (ref 98–107)
CHLORIDE SERPL-SCNC: 106 MMOL/L (ref 98–107)
CHOLEST SERPL-MCNC: 197 MG/DL (ref 100–199)
CHOLEST SERPL-MCNC: 198 MG/DL (ref 100–199)
CHOLEST SERPL-MCNC: 211 MG/DL (ref 0–200)
CHOLEST/HDLC SERPL: 3.7 RATIO (ref 0–4.4)
CHOLEST/HDLC SERPL: 5.5 RATIO (ref 0–4.4)
CK SERPL-CCNC: 608 U/L (ref 20–180)
CLARITY UR: ABNORMAL
CO2 SERPL-SCNC: 19 MMOL/L (ref 22–29)
CO2 SERPL-SCNC: 20 MMOL/L (ref 20–29)
CO2 SERPL-SCNC: 20.6 MMOL/L (ref 22–29)
CO2 SERPL-SCNC: 23.4 MMOL/L (ref 22–29)
COLOR UR: YELLOW
CORTIS SERPL-MCNC: 22.1 MCG/DL
CREAT SERPL-MCNC: 0.91 MG/DL (ref 0.57–1)
CREAT SERPL-MCNC: 0.96 MG/DL (ref 0.57–1)
CREAT SERPL-MCNC: 0.96 MG/DL (ref 0.57–1)
CREAT SERPL-MCNC: 0.98 MG/DL (ref 0.57–1)
DEPRECATED RDW RBC AUTO: 40.8 FL (ref 37–54)
DEPRECATED RDW RBC AUTO: 42 FL (ref 37–54)
DEPRECATED RDW RBC AUTO: 43.7 FL (ref 37–54)
EGFRCR SERPLBLD CKD-EPI 2021: 58.8 ML/MIN/1.73
EGFRCR SERPLBLD CKD-EPI 2021: 58.8 ML/MIN/1.73
EGFRCR SERPLBLD CKD-EPI 2021: 62.7 ML/MIN/1.73
EOSINOPHIL # BLD AUTO: 0.02 10*3/MM3 (ref 0–0.4)
EOSINOPHIL # BLD AUTO: 0.07 10*3/MM3 (ref 0–0.4)
EOSINOPHIL # BLD AUTO: 0.08 10*3/MM3 (ref 0–0.4)
EOSINOPHIL # BLD AUTO: 0.1 X10E3/UL (ref 0–0.4)
EOSINOPHIL NFR BLD AUTO: 0.2 % (ref 0.3–6.2)
EOSINOPHIL NFR BLD AUTO: 0.8 % (ref 0.3–6.2)
EOSINOPHIL NFR BLD AUTO: 1.3 % (ref 0.3–6.2)
EOSINOPHIL NFR BLD AUTO: 2 %
ERYTHROCYTE [DISTWIDTH] IN BLOOD BY AUTOMATED COUNT: 11.8 % (ref 11.7–15.4)
ERYTHROCYTE [DISTWIDTH] IN BLOOD BY AUTOMATED COUNT: 12.1 % (ref 12.3–15.4)
ERYTHROCYTE [DISTWIDTH] IN BLOOD BY AUTOMATED COUNT: 12.4 % (ref 12.3–15.4)
ERYTHROCYTE [DISTWIDTH] IN BLOOD BY AUTOMATED COUNT: 12.6 % (ref 12.3–15.4)
GLOBULIN SER CALC-MCNC: 2.9 G/DL (ref 1.5–4.5)
GLOBULIN UR ELPH-MCNC: 2.6 GM/DL
GLOBULIN UR ELPH-MCNC: 2.6 GM/DL
GLOBULIN UR ELPH-MCNC: 2.8 GM/DL
GLUCOSE BLDC GLUCOMTR-MCNC: 112 MG/DL (ref 70–130)
GLUCOSE BLDC GLUCOMTR-MCNC: 128 MG/DL (ref 70–130)
GLUCOSE SERPL-MCNC: 101 MG/DL (ref 65–99)
GLUCOSE SERPL-MCNC: 102 MG/DL (ref 65–99)
GLUCOSE SERPL-MCNC: 107 MG/DL (ref 65–99)
GLUCOSE SERPL-MCNC: 124 MG/DL (ref 65–99)
GLUCOSE SERPL-MCNC: 163 MG/DL (ref 65–99)
GLUCOSE UR STRIP-MCNC: NEGATIVE MG/DL
HBA1C MFR BLD: 5.8 % (ref 4.8–5.6)
HBA1C MFR BLD: 6.1 % (ref 4.8–5.6)
HBA1C MFR BLD: 6.4 % (ref 4.8–5.6)
HCT VFR BLD AUTO: 39.9 % (ref 34–46.6)
HCT VFR BLD AUTO: 40 % (ref 34–46.6)
HCT VFR BLD AUTO: 40 % (ref 34–46.6)
HCT VFR BLD AUTO: 41.5 % (ref 34–46.6)
HDLC SERPL-MCNC: 36 MG/DL
HDLC SERPL-MCNC: 42 MG/DL (ref 40–60)
HDLC SERPL-MCNC: 53 MG/DL
HGB BLD-MCNC: 13.2 G/DL (ref 11.1–15.9)
HGB BLD-MCNC: 13.5 G/DL (ref 12–15.9)
HGB BLD-MCNC: 13.6 G/DL (ref 12–15.9)
HGB BLD-MCNC: 13.6 G/DL (ref 12–15.9)
HGB UR QL STRIP.AUTO: ABNORMAL
HOLD SPECIMEN: NORMAL
HOLD SPECIMEN: NORMAL
HYALINE CASTS UR QL AUTO: ABNORMAL /LPF
IMM GRANULOCYTES # BLD AUTO: 0 X10E3/UL (ref 0–0.1)
IMM GRANULOCYTES # BLD AUTO: 0.02 10*3/MM3 (ref 0–0.05)
IMM GRANULOCYTES # BLD AUTO: 0.02 10*3/MM3 (ref 0–0.05)
IMM GRANULOCYTES # BLD AUTO: 0.04 10*3/MM3 (ref 0–0.05)
IMM GRANULOCYTES NFR BLD AUTO: 0 %
IMM GRANULOCYTES NFR BLD AUTO: 0.2 % (ref 0–0.5)
IMM GRANULOCYTES NFR BLD AUTO: 0.4 % (ref 0–0.5)
IMM GRANULOCYTES NFR BLD AUTO: 0.4 % (ref 0–0.5)
KETONES UR QL STRIP: ABNORMAL
LDLC SERPL CALC-MCNC: 128 MG/DL (ref 0–99)
LDLC SERPL CALC-MCNC: 135 MG/DL (ref 0–99)
LDLC SERPL CALC-MCNC: 145 MG/DL (ref 0–100)
LDLC/HDLC SERPL: 3.39 {RATIO}
LEFT ATRIUM VOLUME INDEX: 26.1 ML/M2
LEUKOCYTE ESTERASE UR QL STRIP.AUTO: ABNORMAL
LYMPHOCYTES # BLD AUTO: 0.8 10*3/MM3 (ref 0.7–3.1)
LYMPHOCYTES # BLD AUTO: 0.8 X10E3/UL (ref 0.7–3.1)
LYMPHOCYTES # BLD AUTO: 0.88 10*3/MM3 (ref 0.7–3.1)
LYMPHOCYTES # BLD AUTO: 1.18 10*3/MM3 (ref 0.7–3.1)
LYMPHOCYTES NFR BLD AUTO: 12 % (ref 19.6–45.3)
LYMPHOCYTES NFR BLD AUTO: 15 %
LYMPHOCYTES NFR BLD AUTO: 16.6 % (ref 19.6–45.3)
LYMPHOCYTES NFR BLD AUTO: 8.5 % (ref 19.6–45.3)
MAGNESIUM SERPL-MCNC: 1.9 MG/DL (ref 1.6–2.4)
MAGNESIUM SERPL-MCNC: 2 MG/DL (ref 1.6–2.4)
MAXIMAL PREDICTED HEART RATE: 137 BPM
MCH RBC QN AUTO: 30.6 PG (ref 26.6–33)
MCH RBC QN AUTO: 31.1 PG (ref 26.6–33)
MCH RBC QN AUTO: 31.1 PG (ref 26.6–33)
MCH RBC QN AUTO: 31.3 PG (ref 26.6–33)
MCHC RBC AUTO-ENTMCNC: 32.8 G/DL (ref 31.5–35.7)
MCHC RBC AUTO-ENTMCNC: 33 G/DL (ref 31.5–35.7)
MCHC RBC AUTO-ENTMCNC: 33.8 G/DL (ref 31.5–35.7)
MCHC RBC AUTO-ENTMCNC: 34 G/DL (ref 31.5–35.7)
MCV RBC AUTO: 91.5 FL (ref 79–97)
MCV RBC AUTO: 92.4 FL (ref 79–97)
MCV RBC AUTO: 93.5 FL (ref 79–97)
MCV RBC AUTO: 94 FL (ref 79–97)
MONOCYTES # BLD AUTO: 0.4 X10E3/UL (ref 0.1–0.9)
MONOCYTES # BLD AUTO: 0.47 10*3/MM3 (ref 0.1–0.9)
MONOCYTES # BLD AUTO: 0.93 10*3/MM3 (ref 0.1–0.9)
MONOCYTES # BLD AUTO: 1.19 10*3/MM3 (ref 0.1–0.9)
MONOCYTES NFR BLD AUTO: 12.7 % (ref 5–12)
MONOCYTES NFR BLD AUTO: 8.9 % (ref 5–12)
MONOCYTES NFR BLD AUTO: 9 %
MONOCYTES NFR BLD AUTO: 9.4 % (ref 5–12)
NEUTROPHILS # BLD AUTO: 3.8 X10E3/UL (ref 1.4–7)
NEUTROPHILS NFR BLD AUTO: 3.78 10*3/MM3 (ref 1.7–7)
NEUTROPHILS NFR BLD AUTO: 7.28 10*3/MM3 (ref 1.7–7)
NEUTROPHILS NFR BLD AUTO: 7.56 10*3/MM3 (ref 1.7–7)
NEUTROPHILS NFR BLD AUTO: 71.5 % (ref 42.7–76)
NEUTROPHILS NFR BLD AUTO: 73 %
NEUTROPHILS NFR BLD AUTO: 76.8 % (ref 42.7–76)
NEUTROPHILS NFR BLD AUTO: 77.8 % (ref 42.7–76)
NITRITE UR QL STRIP: NEGATIVE
NRBC BLD AUTO-RTO: 0 /100 WBC (ref 0–0.2)
PH UR STRIP.AUTO: 6 [PH] (ref 5–8)
PLATELET # BLD AUTO: 286 10*3/MM3 (ref 140–450)
PLATELET # BLD AUTO: 294 10*3/MM3 (ref 140–450)
PLATELET # BLD AUTO: 308 10*3/MM3 (ref 140–450)
PLATELET # BLD AUTO: 385 X10E3/UL (ref 150–450)
PMV BLD AUTO: 8.7 FL (ref 6–12)
PMV BLD AUTO: 9 FL (ref 6–12)
PMV BLD AUTO: 9.4 FL (ref 6–12)
POTASSIUM SERPL-SCNC: 3.5 MMOL/L (ref 3.5–5.2)
POTASSIUM SERPL-SCNC: 3.5 MMOL/L (ref 3.5–5.2)
POTASSIUM SERPL-SCNC: 4 MMOL/L (ref 3.5–5.2)
POTASSIUM SERPL-SCNC: 4.1 MMOL/L (ref 3.5–5.2)
PROT SERPL-MCNC: 6.4 G/DL (ref 6–8.5)
PROT SERPL-MCNC: 6.7 G/DL (ref 6–8.5)
PROT SERPL-MCNC: 6.7 G/DL (ref 6–8.5)
PROT SERPL-MCNC: 7 G/DL (ref 6–8.5)
PROT UR QL STRIP: ABNORMAL
QT INTERVAL: 359 MS
QT INTERVAL: 377 MS
QT INTERVAL: 478 MS
RBC # BLD AUTO: 4.25 X10E6/UL (ref 3.77–5.28)
RBC # BLD AUTO: 4.32 10*6/MM3 (ref 3.77–5.28)
RBC # BLD AUTO: 4.37 10*6/MM3 (ref 3.77–5.28)
RBC # BLD AUTO: 4.44 10*6/MM3 (ref 3.77–5.28)
RBC # UR STRIP: ABNORMAL /HPF
REF LAB TEST METHOD: ABNORMAL
SODIUM SERPL-SCNC: 140 MMOL/L (ref 136–145)
SODIUM SERPL-SCNC: 141 MMOL/L (ref 134–144)
SODIUM SERPL-SCNC: 141 MMOL/L (ref 136–145)
SODIUM SERPL-SCNC: 143 MMOL/L (ref 136–145)
SP GR UR STRIP: 1.02 (ref 1–1.03)
SQUAMOUS #/AREA URNS HPF: ABNORMAL /HPF
STRESS TARGET HR: 116 BPM
TRIGL SERPL-MCNC: 133 MG/DL (ref 0–150)
TRIGL SERPL-MCNC: 151 MG/DL (ref 0–149)
TRIGL SERPL-MCNC: 86 MG/DL (ref 0–149)
TROPONIN T SERPL-MCNC: 0.02 NG/ML (ref 0–0.03)
TSH SERPL DL<=0.05 MIU/L-ACNC: 1.76 UIU/ML (ref 0.27–4.2)
UROBILINOGEN UR QL STRIP: ABNORMAL
VIT B12 BLD-MCNC: 303 PG/ML (ref 211–946)
VLDLC SERPL CALC-MCNC: 16 MG/DL (ref 5–40)
VLDLC SERPL CALC-MCNC: 27 MG/DL (ref 5–40)
VLDLC SERPL-MCNC: 24 MG/DL (ref 5–40)
WBC # BLD AUTO: 5.1 X10E3/UL (ref 3.4–10.8)
WBC # UR STRIP: ABNORMAL /HPF
WBC NRBC COR # BLD: 5.29 10*3/MM3 (ref 3.4–10.8)
WBC NRBC COR # BLD: 9.37 10*3/MM3 (ref 3.4–10.8)
WBC NRBC COR # BLD: 9.85 10*3/MM3 (ref 3.4–10.8)
WHOLE BLOOD HOLD COAG: NORMAL
WHOLE BLOOD HOLD SPECIMEN: NORMAL

## 2022-01-01 PROCEDURE — 97162 PT EVAL MOD COMPLEX 30 MIN: CPT

## 2022-01-01 PROCEDURE — 25010000002 SODIUM CHLORIDE 0.9 % WITH KCL 20 MEQ 20-0.9 MEQ/L-% SOLUTION: Performed by: INTERNAL MEDICINE

## 2022-01-01 PROCEDURE — 82607 VITAMIN B-12: CPT | Performed by: HOSPITALIST

## 2022-01-01 PROCEDURE — G0378 HOSPITAL OBSERVATION PER HR: HCPCS

## 2022-01-01 PROCEDURE — 84443 ASSAY THYROID STIM HORMONE: CPT | Performed by: HOSPITALIST

## 2022-01-01 PROCEDURE — 82306 VITAMIN D 25 HYDROXY: CPT | Performed by: HOSPITALIST

## 2022-01-01 PROCEDURE — 99214 OFFICE O/P EST MOD 30 MIN: CPT | Performed by: INTERNAL MEDICINE

## 2022-01-01 PROCEDURE — 71045 X-RAY EXAM CHEST 1 VIEW: CPT

## 2022-01-01 PROCEDURE — 80053 COMPREHEN METABOLIC PANEL: CPT | Performed by: HOSPITALIST

## 2022-01-01 PROCEDURE — 36415 COLL VENOUS BLD VENIPUNCTURE: CPT

## 2022-01-01 PROCEDURE — 83735 ASSAY OF MAGNESIUM: CPT | Performed by: HOSPITALIST

## 2022-01-01 PROCEDURE — 99239 HOSP IP/OBS DSCHRG MGMT >30: CPT | Performed by: INTERNAL MEDICINE

## 2022-01-01 PROCEDURE — 97166 OT EVAL MOD COMPLEX 45 MIN: CPT

## 2022-01-01 PROCEDURE — 93226 XTRNL ECG REC<48 HR SCAN A/R: CPT

## 2022-01-01 PROCEDURE — 97535 SELF CARE MNGMENT TRAINING: CPT

## 2022-01-01 PROCEDURE — 1126F AMNT PAIN NOTED NONE PRSNT: CPT | Performed by: INTERNAL MEDICINE

## 2022-01-01 PROCEDURE — 70551 MRI BRAIN STEM W/O DYE: CPT

## 2022-01-01 PROCEDURE — 99220 PR INITIAL OBSERVATION CARE/DAY 70 MINUTES: CPT | Performed by: INTERNAL MEDICINE

## 2022-01-01 PROCEDURE — G0439 PPPS, SUBSEQ VISIT: HCPCS | Performed by: INTERNAL MEDICINE

## 2022-01-01 PROCEDURE — 99232 SBSQ HOSP IP/OBS MODERATE 35: CPT | Performed by: INTERNAL MEDICINE

## 2022-01-01 PROCEDURE — 99233 SBSQ HOSP IP/OBS HIGH 50: CPT | Performed by: PHYSICIAN ASSISTANT

## 2022-01-01 PROCEDURE — 80053 COMPREHEN METABOLIC PANEL: CPT

## 2022-01-01 PROCEDURE — 93005 ELECTROCARDIOGRAM TRACING: CPT | Performed by: HOSPITALIST

## 2022-01-01 PROCEDURE — 99233 SBSQ HOSP IP/OBS HIGH 50: CPT | Performed by: NURSE PRACTITIONER

## 2022-01-01 PROCEDURE — 99222 1ST HOSP IP/OBS MODERATE 55: CPT | Performed by: PSYCHIATRY & NEUROLOGY

## 2022-01-01 PROCEDURE — 70450 CT HEAD/BRAIN W/O DYE: CPT

## 2022-01-01 PROCEDURE — 80061 LIPID PANEL: CPT | Performed by: NURSE PRACTITIONER

## 2022-01-01 PROCEDURE — 93010 ELECTROCARDIOGRAM REPORT: CPT | Performed by: INTERNAL MEDICINE

## 2022-01-01 PROCEDURE — 99232 SBSQ HOSP IP/OBS MODERATE 35: CPT | Performed by: NURSE PRACTITIONER

## 2022-01-01 PROCEDURE — 99397 PER PM REEVAL EST PAT 65+ YR: CPT | Performed by: INTERNAL MEDICINE

## 2022-01-01 PROCEDURE — 93880 EXTRACRANIAL BILAT STUDY: CPT

## 2022-01-01 PROCEDURE — 85025 COMPLETE CBC W/AUTO DIFF WBC: CPT

## 2022-01-01 PROCEDURE — 25010000002 HALOPERIDOL LACTATE PER 5 MG: Performed by: INTERNAL MEDICINE

## 2022-01-01 PROCEDURE — 85025 COMPLETE CBC W/AUTO DIFF WBC: CPT | Performed by: HOSPITALIST

## 2022-01-01 PROCEDURE — 97530 THERAPEUTIC ACTIVITIES: CPT

## 2022-01-01 PROCEDURE — 25010000002 CEFTRIAXONE PER 250 MG: Performed by: INTERNAL MEDICINE

## 2022-01-01 PROCEDURE — 97110 THERAPEUTIC EXERCISES: CPT

## 2022-01-01 PROCEDURE — 83036 HEMOGLOBIN GLYCOSYLATED A1C: CPT | Performed by: NURSE PRACTITIONER

## 2022-01-01 PROCEDURE — 92610 EVALUATE SWALLOWING FUNCTION: CPT

## 2022-01-01 PROCEDURE — 82550 ASSAY OF CK (CPK): CPT | Performed by: HOSPITALIST

## 2022-01-01 PROCEDURE — 87086 URINE CULTURE/COLONY COUNT: CPT | Performed by: EMERGENCY MEDICINE

## 2022-01-01 PROCEDURE — 97116 GAIT TRAINING THERAPY: CPT

## 2022-01-01 PROCEDURE — 25010000002 CEFTRIAXONE PER 250 MG: Performed by: EMERGENCY MEDICINE

## 2022-01-01 PROCEDURE — 1170F FXNL STATUS ASSESSED: CPT | Performed by: INTERNAL MEDICINE

## 2022-01-01 PROCEDURE — 1160F RVW MEDS BY RX/DR IN RCRD: CPT | Performed by: INTERNAL MEDICINE

## 2022-01-01 PROCEDURE — 93227 XTRNL ECG REC<48 HR R&I: CPT | Performed by: INTERNAL MEDICINE

## 2022-01-01 PROCEDURE — 81001 URINALYSIS AUTO W/SCOPE: CPT | Performed by: EMERGENCY MEDICINE

## 2022-01-01 PROCEDURE — 95819 EEG AWAKE AND ASLEEP: CPT

## 2022-01-01 PROCEDURE — 93005 ELECTROCARDIOGRAM TRACING: CPT | Performed by: EMERGENCY MEDICINE

## 2022-01-01 PROCEDURE — 84484 ASSAY OF TROPONIN QUANT: CPT

## 2022-01-01 PROCEDURE — 93225 XTRNL ECG REC<48 HRS REC: CPT

## 2022-01-01 PROCEDURE — 93005 ELECTROCARDIOGRAM TRACING: CPT

## 2022-01-01 PROCEDURE — 93306 TTE W/DOPPLER COMPLETE: CPT

## 2022-01-01 PROCEDURE — 82962 GLUCOSE BLOOD TEST: CPT

## 2022-01-01 PROCEDURE — 82533 TOTAL CORTISOL: CPT | Performed by: HOSPITALIST

## 2022-01-01 PROCEDURE — 99285 EMERGENCY DEPT VISIT HI MDM: CPT

## 2022-01-01 PROCEDURE — 93306 TTE W/DOPPLER COMPLETE: CPT | Performed by: INTERNAL MEDICINE

## 2022-01-01 PROCEDURE — 83735 ASSAY OF MAGNESIUM: CPT

## 2022-01-01 PROCEDURE — 93005 ELECTROCARDIOGRAM TRACING: CPT | Performed by: INTERNAL MEDICINE

## 2022-01-01 RX ORDER — SODIUM CHLORIDE 0.9 % (FLUSH) 0.9 %
10 SYRINGE (ML) INJECTION AS NEEDED
Status: DISCONTINUED | OUTPATIENT
Start: 2022-01-01 | End: 2022-01-01 | Stop reason: HOSPADM

## 2022-01-01 RX ORDER — HALOPERIDOL 5 MG/ML
2 INJECTION INTRAMUSCULAR EVERY 6 HOURS PRN
Status: DISCONTINUED | OUTPATIENT
Start: 2022-01-01 | End: 2022-01-01 | Stop reason: HOSPADM

## 2022-01-01 RX ORDER — CHOLECALCIFEROL (VITAMIN D3) 125 MCG
1000 CAPSULE ORAL DAILY
Status: DISCONTINUED | OUTPATIENT
Start: 2022-01-01 | End: 2022-01-01 | Stop reason: HOSPADM

## 2022-01-01 RX ORDER — METOPROLOL SUCCINATE 50 MG/1
50 TABLET, EXTENDED RELEASE ORAL
Status: DISCONTINUED | OUTPATIENT
Start: 2022-01-01 | End: 2022-01-01

## 2022-01-01 RX ORDER — ACETAMINOPHEN 325 MG/1
650 TABLET ORAL EVERY 4 HOURS PRN
Status: DISCONTINUED | OUTPATIENT
Start: 2022-01-01 | End: 2022-01-01 | Stop reason: HOSPADM

## 2022-01-01 RX ORDER — HALOPERIDOL 5 MG/ML
1 INJECTION INTRAMUSCULAR ONCE AS NEEDED
Status: COMPLETED | OUTPATIENT
Start: 2022-01-01 | End: 2022-01-01

## 2022-01-01 RX ORDER — DONEPEZIL HYDROCHLORIDE 5 MG/1
5 TABLET, FILM COATED ORAL NIGHTLY
Status: DISCONTINUED | OUTPATIENT
Start: 2022-01-01 | End: 2022-01-01 | Stop reason: HOSPADM

## 2022-01-01 RX ORDER — HALOPERIDOL 5 MG/ML
1 INJECTION INTRAMUSCULAR ONCE
Status: DISCONTINUED | OUTPATIENT
Start: 2022-01-01 | End: 2022-01-01

## 2022-01-01 RX ORDER — ROSUVASTATIN CALCIUM 20 MG/1
20 TABLET, COATED ORAL NIGHTLY
Status: DISCONTINUED | OUTPATIENT
Start: 2022-01-01 | End: 2022-01-01 | Stop reason: HOSPADM

## 2022-01-01 RX ORDER — DONEPEZIL HYDROCHLORIDE 5 MG/1
5 TABLET, FILM COATED ORAL NIGHTLY
Qty: 90 TABLET | Refills: 3 | Status: SHIPPED | OUTPATIENT
Start: 2022-01-01

## 2022-01-01 RX ORDER — POTASSIUM CHLORIDE 750 MG/1
20 TABLET, FILM COATED, EXTENDED RELEASE ORAL DAILY
Status: DISCONTINUED | OUTPATIENT
Start: 2022-01-01 | End: 2022-01-01 | Stop reason: HOSPADM

## 2022-01-01 RX ORDER — SODIUM CHLORIDE 0.9 % (FLUSH) 0.9 %
10 SYRINGE (ML) INJECTION EVERY 12 HOURS SCHEDULED
Status: DISCONTINUED | OUTPATIENT
Start: 2022-01-01 | End: 2022-01-01 | Stop reason: HOSPADM

## 2022-01-01 RX ORDER — ASPIRIN 81 MG/1
81 TABLET, CHEWABLE ORAL DAILY
Status: DISCONTINUED | OUTPATIENT
Start: 2022-01-01 | End: 2022-01-01 | Stop reason: HOSPADM

## 2022-01-01 RX ORDER — ASPIRIN 81 MG/1
81 TABLET, CHEWABLE ORAL DAILY
Qty: 90 TABLET | Refills: 3 | Status: SHIPPED | OUTPATIENT
Start: 2022-01-01

## 2022-01-01 RX ORDER — UBIDECARENONE 75 MG
100 CAPSULE ORAL DAILY
Status: DISCONTINUED | OUTPATIENT
Start: 2022-01-01 | End: 2022-01-01

## 2022-01-01 RX ORDER — OLANZAPINE 10 MG/1
2.5 INJECTION, POWDER, LYOPHILIZED, FOR SOLUTION INTRAMUSCULAR ONCE
Status: COMPLETED | OUTPATIENT
Start: 2022-01-01 | End: 2022-01-01

## 2022-01-01 RX ORDER — HALOPERIDOL 1 MG/1
1 TABLET ORAL EVERY 8 HOURS SCHEDULED
Status: DISCONTINUED | OUTPATIENT
Start: 2022-01-01 | End: 2022-01-01 | Stop reason: HOSPADM

## 2022-01-01 RX ORDER — HALOPERIDOL 1 MG/1
1 TABLET ORAL EVERY 8 HOURS SCHEDULED
Qty: 90 TABLET | Refills: 2 | Status: SHIPPED | OUTPATIENT
Start: 2022-01-01

## 2022-01-01 RX ORDER — HALOPERIDOL 2 MG/1
2 TABLET ORAL NIGHTLY
Qty: 30 TABLET | Refills: 2 | Status: SHIPPED | OUTPATIENT
Start: 2022-01-01

## 2022-01-01 RX ORDER — CHOLECALCIFEROL (VITAMIN D3) 125 MCG
500 CAPSULE ORAL DAILY
Status: DISCONTINUED | OUTPATIENT
Start: 2022-01-01 | End: 2022-01-01

## 2022-01-01 RX ORDER — SODIUM CHLORIDE AND POTASSIUM CHLORIDE 150; 900 MG/100ML; MG/100ML
50 INJECTION, SOLUTION INTRAVENOUS CONTINUOUS
Status: DISCONTINUED | OUTPATIENT
Start: 2022-01-01 | End: 2022-01-01

## 2022-01-01 RX ORDER — HYDROCHLOROTHIAZIDE 12.5 MG/1
12.5 TABLET ORAL DAILY
Status: DISCONTINUED | OUTPATIENT
Start: 2022-01-01 | End: 2022-01-01

## 2022-01-01 RX ORDER — HALOPERIDOL 2 MG/1
2 TABLET ORAL NIGHTLY
Status: DISCONTINUED | OUTPATIENT
Start: 2022-01-01 | End: 2022-01-01 | Stop reason: HOSPADM

## 2022-01-01 RX ORDER — CHOLECALCIFEROL (VITAMIN D3) 125 MCG
5 CAPSULE ORAL NIGHTLY PRN
Status: DISCONTINUED | OUTPATIENT
Start: 2022-01-01 | End: 2022-01-01 | Stop reason: HOSPADM

## 2022-01-01 RX ADMIN — ROSUVASTATIN CALCIUM 20 MG: 20 TABLET, FILM COATED ORAL at 23:58

## 2022-01-01 RX ADMIN — ASPIRIN 81 MG: 81 TABLET, CHEWABLE ORAL at 09:50

## 2022-01-01 RX ADMIN — METOPROLOL TARTRATE 25 MG: 25 TABLET, FILM COATED ORAL at 09:34

## 2022-01-01 RX ADMIN — CEFTRIAXONE SODIUM 1 G: 1 INJECTION, POWDER, FOR SOLUTION INTRAMUSCULAR; INTRAVENOUS at 19:47

## 2022-01-01 RX ADMIN — Medication 10 ML: at 22:15

## 2022-01-01 RX ADMIN — Medication 100 MCG: at 09:53

## 2022-01-01 RX ADMIN — ROSUVASTATIN CALCIUM 20 MG: 20 TABLET, FILM COATED ORAL at 20:48

## 2022-01-01 RX ADMIN — METOPROLOL TARTRATE 25 MG: 25 TABLET, FILM COATED ORAL at 09:35

## 2022-01-01 RX ADMIN — CEFTRIAXONE SODIUM 1 G: 1 INJECTION, POWDER, FOR SOLUTION INTRAMUSCULAR; INTRAVENOUS at 19:36

## 2022-01-01 RX ADMIN — Medication 10 ML: at 09:49

## 2022-01-01 RX ADMIN — Medication 10 ML: at 19:54

## 2022-01-01 RX ADMIN — HYDROCHLOROTHIAZIDE 12.5 MG: 12.5 TABLET ORAL at 09:53

## 2022-01-01 RX ADMIN — POTASSIUM CHLORIDE 20 MEQ: 750 TABLET, EXTENDED RELEASE ORAL at 08:10

## 2022-01-01 RX ADMIN — Medication 1000 MCG: at 09:39

## 2022-01-01 RX ADMIN — ROSUVASTATIN CALCIUM 20 MG: 20 TABLET, FILM COATED ORAL at 19:49

## 2022-01-01 RX ADMIN — METOPROLOL TARTRATE 25 MG: 25 TABLET, FILM COATED ORAL at 09:36

## 2022-01-01 RX ADMIN — Medication 1000 MCG: at 09:34

## 2022-01-01 RX ADMIN — Medication 100 MCG: at 08:10

## 2022-01-01 RX ADMIN — ASPIRIN 81 MG: 81 TABLET, CHEWABLE ORAL at 09:34

## 2022-01-01 RX ADMIN — POTASSIUM CHLORIDE 20 MEQ: 750 TABLET, EXTENDED RELEASE ORAL at 09:35

## 2022-01-01 RX ADMIN — POTASSIUM CHLORIDE 20 MEQ: 750 TABLET, EXTENDED RELEASE ORAL at 09:32

## 2022-01-01 RX ADMIN — OLANZAPINE 2.5 MG: 10 INJECTION, POWDER, FOR SOLUTION INTRAMUSCULAR at 01:34

## 2022-01-01 RX ADMIN — Medication 100 MCG: at 09:36

## 2022-01-01 RX ADMIN — POTASSIUM CHLORIDE 20 MEQ: 750 TABLET, EXTENDED RELEASE ORAL at 09:52

## 2022-01-01 RX ADMIN — HALOPERIDOL LACTATE 1 MG: 5 INJECTION, SOLUTION INTRAMUSCULAR at 09:32

## 2022-01-01 RX ADMIN — POTASSIUM CHLORIDE 20 MEQ: 750 TABLET, EXTENDED RELEASE ORAL at 09:34

## 2022-01-01 RX ADMIN — Medication 10 ML: at 08:10

## 2022-01-01 RX ADMIN — HYDROCHLOROTHIAZIDE 12.5 MG: 12.5 TABLET ORAL at 08:10

## 2022-01-01 RX ADMIN — METOPROLOL TARTRATE 25 MG: 25 TABLET, FILM COATED ORAL at 09:32

## 2022-01-01 RX ADMIN — ROSUVASTATIN CALCIUM 20 MG: 20 TABLET, FILM COATED ORAL at 20:33

## 2022-01-01 RX ADMIN — METOPROLOL TARTRATE 25 MG: 25 TABLET, FILM COATED ORAL at 15:04

## 2022-01-01 RX ADMIN — Medication 10 ML: at 09:36

## 2022-01-01 RX ADMIN — ACETAMINOPHEN 650 MG: 325 TABLET, FILM COATED ORAL at 09:35

## 2022-01-01 RX ADMIN — Medication 5 MG: at 20:48

## 2022-01-01 RX ADMIN — Medication 10 ML: at 09:53

## 2022-01-01 RX ADMIN — Medication 10 ML: at 20:33

## 2022-01-01 RX ADMIN — DONEPEZIL HYDROCHLORIDE 5 MG: 5 TABLET, FILM COATED ORAL at 23:58

## 2022-01-01 RX ADMIN — HALOPERIDOL 2 MG: 2 TABLET ORAL at 23:58

## 2022-01-01 RX ADMIN — Medication 5 MG: at 20:33

## 2022-01-01 RX ADMIN — METOPROLOL SUCCINATE 50 MG: 50 TABLET, FILM COATED, EXTENDED RELEASE ORAL at 09:53

## 2022-01-01 RX ADMIN — ACETAMINOPHEN 650 MG: 325 TABLET, FILM COATED ORAL at 23:58

## 2022-01-01 RX ADMIN — Medication 1000 MCG: at 09:33

## 2022-01-01 RX ADMIN — CEFTRIAXONE SODIUM 1 G: 1 INJECTION, POWDER, FOR SOLUTION INTRAMUSCULAR; INTRAVENOUS at 20:33

## 2022-01-01 RX ADMIN — POTASSIUM CHLORIDE AND SODIUM CHLORIDE 50 ML/HR: 900; 150 INJECTION, SOLUTION INTRAVENOUS at 12:26

## 2022-01-01 RX ADMIN — POTASSIUM CHLORIDE AND SODIUM CHLORIDE 50 ML/HR: 900; 150 INJECTION, SOLUTION INTRAVENOUS at 11:53

## 2022-01-01 RX ADMIN — POTASSIUM CHLORIDE 20 MEQ: 750 TABLET, EXTENDED RELEASE ORAL at 09:36

## 2022-01-01 RX ADMIN — Medication 10 ML: at 21:00

## 2022-01-01 RX ADMIN — HALOPERIDOL 2 MG: 2 TABLET ORAL at 22:08

## 2022-02-18 NOTE — PROGRESS NOTES
Subjective   Cesilia Lutz is a 81 y.o. female.     Chief Complaint   Patient presents with   • Wellness Check   • Hypertension   • Hyperlipidemia         In for annual preventive exam today. Sleeps well and gets 10 hours of sleep at night. Exercises every other day. Energy is good. Diet is well-balanced.       The following portions of the patient's history were reviewed and updated as appropriate: allergies, current medications, past social history and problem list.    Outpatient Medications Marked as Taking for the 2/18/22 encounter (Office Visit) with Quinn Cabrera MD   Medication Sig Dispense Refill   • Cyanocobalamin (VITAMIN B 12 PO) Take 1 tablet by mouth Daily.     • estradiol 0.25 MG/0.25GM gel Place  on the skin as directed by provider Daily.     • medroxyPROGESTERone (PROVERA) 2.5 MG tablet Take 2.5 mg by mouth Daily.     • metoprolol succinate XL (TOPROL-XL) 50 MG 24 hr tablet Take 1 tablet by mouth once daily 90 tablet 0   • potassium chloride (K-DUR,KLOR-CON) 10 MEQ CR tablet Take 2 tablets by mouth once daily 180 tablet 0   • rosuvastatin (CRESTOR) 20 MG tablet Take 1 tablet by mouth once daily 90 tablet 1   • triamterene-hydrochlorothiazide (DYAZIDE) 37.5-25 MG per capsule Take 1 capsule by mouth once daily in the morning 90 capsule 0       Review of Systems   Constitutional: Positive for unexpected weight change. Negative for appetite change, chills, diaphoresis, fatigue and fever.   Respiratory: Negative for cough, chest tightness, shortness of breath and wheezing.    Cardiovascular: Negative for chest pain, palpitations and leg swelling.   Gastrointestinal: Negative for abdominal pain, anal bleeding, blood in stool, constipation ( occassional), diarrhea, nausea, rectal pain and vomiting.   Endocrine: Negative for cold intolerance, heat intolerance and polyuria.   Genitourinary: Negative for difficulty urinating, dysuria, flank pain, frequency, hematuria and urgency.   Musculoskeletal:  Positive for arthralgias ( right knee and right hand). Negative for back pain and myalgias.   Allergic/Immunologic: Negative for environmental allergies.   Neurological: Negative for dizziness, syncope, speech difficulty, weakness, light-headedness, numbness and headaches.   Hematological: Does not bruise/bleed easily.   Psychiatric/Behavioral: Negative for agitation, confusion, dysphoric mood and sleep disturbance. The patient is not nervous/anxious. Hyperactive: right knee.        Objective   Vitals:    02/18/22 1421   BP: 120/82   Pulse: 69   Resp: 16   Temp: 96.3 °F (35.7 °C)   SpO2: 99%      Wt Readings from Last 3 Encounters:   02/18/22 53.3 kg (117 lb 8 oz)   07/02/21 61.7 kg (136 lb)   08/10/20 64.4 kg (142 lb)    Body mass index is 23.72 kg/m².      Physical Exam  Vitals and nursing note reviewed.   Constitutional:       Appearance: Normal appearance. She is well-developed.   HENT:      Right Ear: External ear normal.      Left Ear: External ear normal.      Nose: Nose normal.   Eyes:      Conjunctiva/sclera: Conjunctivae normal.      Pupils: Pupils are equal, round, and reactive to light.   Neck:      Thyroid: No thyromegaly.      Vascular: No JVD.   Cardiovascular:      Rate and Rhythm: Normal rate and regular rhythm.      Heart sounds: Normal heart sounds. No murmur heard.  No gallop.    Pulmonary:      Effort: Pulmonary effort is normal. No respiratory distress.      Breath sounds: Normal breath sounds. No wheezing or rales.   Abdominal:      General: Bowel sounds are normal. There is no distension.      Palpations: Abdomen is soft. There is no mass.      Tenderness: There is no abdominal tenderness. There is no guarding.      Hernia: No hernia is present.   Musculoskeletal:         General: Normal range of motion.      Cervical back: Normal range of motion and neck supple.   Lymphadenopathy:      Cervical: No cervical adenopathy.   Skin:     General: Skin is warm and dry.   Neurological:      General:  No focal deficit present.      Mental Status: She is alert and oriented to person, place, and time.      Cranial Nerves: No cranial nerve deficit.      Coordination: Coordination normal.      Deep Tendon Reflexes: Reflexes normal.   Psychiatric:         Mood and Affect: Mood normal.         Behavior: Behavior normal.         Thought Content: Thought content normal.         Judgment: Judgment normal.           Problems Addressed this Visit        Cardiac and Vasculature    Hypertension - Primary    Hyperlipidemia    Relevant Orders    Lipid Panel With / Chol / HDL Ratio       Endocrine and Metabolic    Impaired glucose tolerance    Relevant Orders    Hemoglobin A1c      Other Visit Diagnoses     Medication management        Relevant Orders    CBC & Differential    Comprehensive Metabolic Panel    Screening mammogram, encounter for        Relevant Orders    Mammo Screening Digital Tomosynthesis Bilateral With CAD      Diagnoses       Codes Comments    Primary hypertension    -  Primary ICD-10-CM: I10  ICD-9-CM: 401.9     Hyperlipidemia, unspecified hyperlipidemia type     ICD-10-CM: E78.5  ICD-9-CM: 272.4     Impaired glucose tolerance     ICD-10-CM: R73.02  ICD-9-CM: 790.22     Medication management     ICD-10-CM: Z79.899  ICD-9-CM: V58.69     Screening mammogram, encounter for     ICD-10-CM: Z12.31  ICD-9-CM: V76.12         Assessment/Plan   In for annual preventive exam and recheck of hypertension, hyperlipidemia and IGT today February 2022.  Annual lab work today Feb. 2022 including CBC, CMP, lipids, A1c, UA.  Gets glucose, A1c, and lipids every 6 months.  Annual wellness visit today February 2022.  Feels great.  Tolerating medications well.  Very active.  Walks regularly. Right knee arthritis seems to be worsening. Getting ready to see orthopedist on this.  No changes are made today. Due for mammogram. She is 1H PP today.    Prevention counseling was performed today. The counseling performed was routine health  maintenance topics including BMI and exercise.    The above information was reviewed again today 02/18/22.  It continues to be accurate as reflected above and is unchanged.  History, physical and review of systems all reviewed and are unchanged.  Medications were reviewed today and continue the current dosing.    PPE today includes face mask and eye shield.             Dragon disclaimer:   Much of this encounter note is an electronic transcription/translation of spoken language to printed text. The electronic translation of spoken language may permit erroneous, or at times, nonsensical words or phrases to be inadvertently transcribed; Although I have reviewed the note for such errors, some may still exist.

## 2022-02-18 NOTE — PROGRESS NOTES
The ABCs of the Annual Wellness Visit  Subsequent Medicare Wellness Visit    Chief Complaint   Patient presents with   • Wellness Check   • Hypertension   • Hyperlipidemia      Subjective    History of Present Illness:  Cesilia Lutz is a 81 y.o. female who presents for a Subsequent Medicare Wellness Visit.    The following portions of the patient's history were reviewed and   updated as appropriate: allergies, current medications, past family history, past medical history, past social history, past surgical history and problem list.    Compared to one year ago, the patient feels her physical   health is the same.    Compared to one year ago, the patient feels her mental   health is the same.    Recent Hospitalizations:  She was not admitted to the hospital during the last year.       Current Medical Providers:  Patient Care Team:  Quinn Cabrera MD as PCP - General (Internal Medicine)  Quinn Cabrera MD as PCP - Internal Medicine (Internal Medicine)  Xiomara Díaz MD as Consulting Physician (Obstetrics and Gynecology)    Outpatient Medications Prior to Visit   Medication Sig Dispense Refill   • Cyanocobalamin (VITAMIN B 12 PO) Take 1 tablet by mouth Daily.     • estradiol 0.25 MG/0.25GM gel Place  on the skin as directed by provider Daily.     • medroxyPROGESTERone (PROVERA) 2.5 MG tablet Take 2.5 mg by mouth Daily.     • metoprolol succinate XL (TOPROL-XL) 50 MG 24 hr tablet Take 1 tablet by mouth once daily 90 tablet 0   • potassium chloride (K-DUR,KLOR-CON) 10 MEQ CR tablet Take 2 tablets by mouth once daily 180 tablet 0   • rosuvastatin (CRESTOR) 20 MG tablet Take 1 tablet by mouth once daily 90 tablet 1   • triamterene-hydrochlorothiazide (DYAZIDE) 37.5-25 MG per capsule Take 1 capsule by mouth once daily in the morning 90 capsule 0     No facility-administered medications prior to visit.       No opioid medication identified on active medication list. I have reviewed chart for other potential  high  "risk medication/s and harmful drug interactions in the elderly.          Aspirin is not on active medication list.  Aspirin use is not indicated based on review of current medical condition/s. Risk of harm outweighs potential benefits.  .    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  ACP discussion was declined by the patient. Patient does not have an advance directive, information provided.          Objective    Vitals:    02/18/22 1421   Pulse: 69   Resp: 16   Temp: 96.3 °F (35.7 °C)   TempSrc: Temporal   SpO2: 99%   Weight: 53.3 kg (117 lb 8 oz)   Height: 149.9 cm (59.02\")   PainSc: 0-No pain     BMI Readings from Last 1 Encounters:   02/18/22 23.72 kg/m²   BMI is within normal parameters. No follow-up required.    Does the patient have evidence of cognitive impairment? No    Physical Exam            HEALTH RISK ASSESSMENT    Smoking Status:  Social History     Tobacco Use   Smoking Status Never Smoker   Smokeless Tobacco Never Used     Alcohol Consumption:  Social History     Substance and Sexual Activity   Alcohol Use No     Fall Risk Screen:    [facility].    Depression Screening:  PHQ-2/PHQ-9 Depression Screening 2/18/2022   Little interest or pleasure in doing things 0   Feeling down, depressed, or hopeless 0   Trouble falling or staying asleep, or sleeping too much 0   Feeling tired or having little energy 0   Poor appetite or overeating 0   Feeling bad about yourself - or that you are a failure or have let yourself or your family down 0   Trouble concentrating on things, such as reading the newspaper or watching television 0   Moving or speaking so slowly that other people could have noticed. Or the opposite - being so " fidgety or restless that you have been moving around a lot more than usual 0   Thoughts that you would be better off dead, or of hurting yourself in some way 0   Total Score 0   If you checked off any problems, how difficult have these problems made it for you to do your work, take care of things at home, or get along with other people? Not difficult at all       Health Habits and Functional and Cognitive Screening:  Functional & Cognitive Status 2/18/2022   Do you have difficulty preparing food and eating? No   Do you have difficulty bathing yourself, getting dressed or grooming yourself? No   Do you have difficulty using the toilet? No   Do you have difficulty moving around from place to place? Yes   Do you have trouble with steps or getting out of a bed or a chair? Yes   Current Diet Well Balanced Diet   Dental Exam Up to date   Eye Exam Not up to date   Exercise (times per week) 0 times per week   Current Exercises Include No Regular Exercise   Current Exercise Activities Include -   Do you need help using the phone?  No   Are you deaf or do you have serious difficulty hearing?  No   Do you need help with transportation? No   Do you need help shopping? No   Do you need help preparing meals?  No   Do you need help with housework?  No   Do you need help with laundry? No   Do you need help taking your medications? No   Do you need help managing money? No   Do you ever drive or ride in a car without wearing a seat belt? No   Have you felt unusual stress, anger or loneliness in the last month? No   Who do you live with? Alone   If you need help, do you have trouble finding someone available to you? No   Have you been bothered in the last four weeks by sexual problems? No   Do you have difficulty concentrating, remembering or making decisions? No       Age-appropriate Screening Schedule:  Refer to the list below for future screening recommendations based on patient's age, sex and/or medical conditions. Orders for  these recommended tests are listed in the plan section. The patient has been provided with a written plan.    Health Maintenance   Topic Date Due   • MAMMOGRAM  10/27/2021   • PAP SMEAR  12/01/2021   • LIPID PANEL  07/02/2022   • TDAP/TD VACCINES (3 - Td or Tdap) 07/13/2028   • DXA SCAN  08/23/2032   • INFLUENZA VACCINE  Discontinued   • ZOSTER VACCINE  Discontinued              Assessment/Plan   CMS Preventative Services Quick Reference  Risk Factors Identified During Encounter  Chronic Pain   Dementia/Memory   Fall Risk-High or Moderate  The above risks/problems have been discussed with the patient.  Follow up actions/plans if indicated are seen below in the Assessment/Plan Section.  Pertinent information has been shared with the patient in the After Visit Summary.    There are no diagnoses linked to this encounter.    Follow Up:   No follow-ups on file.     An After Visit Summary and PPPS were made available to the patient.        I spent 15 minutes caring for Cesilia on this date of service. This time includes time spent by me in the following activities:preparing for the visit

## 2022-08-18 NOTE — PROGRESS NOTES
Subjective   Cesilia Lutz is a 82 y.o. female.     Chief Complaint   Patient presents with   • Annual Exam   • Hyperlipidemia   • Hypertension         In for recheck of chronic IGT.    Hyperlipidemia  This is a chronic problem. The current episode started more than 1 year ago. The problem is controlled. Recent lipid tests were reviewed and are normal. Pertinent negatives include no chest pain or shortness of breath.   Hypertension  This is a chronic problem. The current episode started more than 1 year ago. The problem is unchanged. The problem is controlled. Pertinent negatives include no chest pain, headaches, orthopnea, palpitations, peripheral edema, PND or shortness of breath.   Hyperglycemia  This is a chronic problem. The current episode started more than 1 year ago. Pertinent negatives include no abdominal pain, chest pain, coughing or headaches.        The following portions of the patient's history were reviewed and updated as appropriate: allergies, current medications, past social history and problem list.    Outpatient Medications Marked as Taking for the 8/18/22 encounter (Office Visit) with Quinn Cabrera MD   Medication Sig Dispense Refill   • Cyanocobalamin (VITAMIN B 12 PO) Take 1 tablet by mouth Daily.     • estradiol 0.25 MG/0.25GM gel Place  on the skin as directed by provider Daily.     • medroxyPROGESTERone (PROVERA) 2.5 MG tablet Take 2.5 mg by mouth Daily.     • metoprolol succinate XL (TOPROL-XL) 50 MG 24 hr tablet Take 1 tablet by mouth once daily 90 tablet 0   • potassium chloride (K-DUR,KLOR-CON) 10 MEQ CR tablet Take 2 tablets by mouth once daily 180 tablet 0   • rosuvastatin (CRESTOR) 20 MG tablet Take 1 tablet by mouth once daily 90 tablet 1   • triamterene-hydrochlorothiazide (DYAZIDE) 37.5-25 MG per capsule Take 1 capsule by mouth once daily in the morning 90 capsule 0       Review of Systems   Respiratory: Negative for cough, shortness of breath and wheezing.    Cardiovascular:  Negative for chest pain, palpitations, orthopnea, leg swelling and PND.   Gastrointestinal: Negative for abdominal pain, constipation and diarrhea.   Neurological: Negative for headaches.       Objective   Vitals:    08/18/22 1359   BP: 110/64   Pulse: 83   Resp: 16   Temp: 97.9 °F (36.6 °C)   SpO2: 98%          08/18/22  1359   Weight: 51.7 kg (114 lb)    [unfilled]  Body mass index is 23.03 kg/m².      Physical Exam   Constitutional: She appears well-developed.   Neck: No thyromegaly present.   Cardiovascular: Normal rate and regular rhythm.  No extrasystoles are present. Exam reveals no gallop.   Murmur heard.   Crescendo decrescendo systolic murmur is present with a grade of 2/6.  Aortic area   Pulmonary/Chest: Effort normal and breath sounds normal. No respiratory distress. She has no wheezes. She has no rales.   Abdominal: Soft. Normal appearance and bowel sounds are normal. She exhibits no mass. There is no abdominal tenderness. There is no guarding.   Neurological: She is alert.         Problems Addressed this Visit        Cardiac and Vasculature    Hypertension - Primary    Hyperlipidemia       Endocrine and Metabolic    Impaired glucose tolerance      Diagnoses       Codes Comments    Primary hypertension    -  Primary ICD-10-CM: I10  ICD-9-CM: 401.9     Hyperlipidemia, unspecified hyperlipidemia type     ICD-10-CM: E78.5  ICD-9-CM: 272.4     Impaired glucose tolerance     ICD-10-CM: R73.02  ICD-9-CM: 790.22         Assessment & Plan   In for recheck of hypertension, hyperlipidemia and IGT.  Annual lab work February 2022 including CBC, CMP, lipids, A1c, UA.  Gets glucose, A1c, and lipids every 3 months.  Annual preventive exam and annual wellness visit February 2023.  Feels great.  Tolerating medications well.  Very active.  Walks regularly.  No changes are made today.  Follow-up in 3 months.  She remains on metoprolol succinate 50 mg daily and Dyazide 1 daily for hypertension and those are reviewed  today.  She has an AAS murmur on exam and we will check a TTE to quantify that.  She is 2.5 HPP today.    The above information was reviewed again today 08/18/22.  It continues to be accurate as reflected above and is unchanged.  History, physical and review of systems all reviewed and are unchanged.  Medications were reviewed today and continue the current dosing.    PPE today includes face mask and eye shield.         Dragon disclaimer:   Much of this encounter note is an electronic transcription/translation of spoken language to printed text. The electronic translation of spoken language may permit erroneous, or at times, nonsensical words or phrases to be inadvertently transcribed; Although I have reviewed the note for such errors, some may still exist.

## 2022-10-14 PROBLEM — I35.0 NONRHEUMATIC AORTIC VALVE STENOSIS: Status: ACTIVE | Noted: 2022-01-01

## 2022-11-03 PROBLEM — R44.1 VISUAL HALLUCINATION: Status: ACTIVE | Noted: 2022-01-01

## 2022-11-03 PROBLEM — N39.0 ACUTE UTI: Status: ACTIVE | Noted: 2022-01-01

## 2022-11-03 NOTE — ED TRIAGE NOTES
Patient to ed from  with complaints of altered mental status and hallucinations. Patient daughter states that she called stating that there were people in her closet and that she saw a snake in her apt. Daughter states ems came out earlier but patient did not want to be seen.

## 2022-11-03 NOTE — ED PROVIDER NOTES
" EMERGENCY DEPARTMENT ENCOUNTER    Room Number:  P592/1  Date of encounter:  11/4/2022  PCP: Quinn Cabrera MD  Historian: Patient and patient's daughter at bedside      HPI:  Chief Complaint: Hallucinations and confusion  A complete HPI/ROS/PMH/PSH/SH/FH are unobtainable due to: Altered mental status    Context: Cesilia Lutz is a 82 y.o. female who presents to the ED c/o visual hallucinations today as well as being confused.  Patient's daughter provides most the history, and says that her mother does have some \"memory issues\" which is developed over the last few months, but otherwise lives alone independently and she checks in on her almost every day.  She said that she saw her yesterday and she appeared to be fine, but today she spoke with her twice and she talked about someone being in her house as well as mistaking an extension cord for a snake.    Patient has not fallen, she has not felt ill, but does appear weak according to the daughter and a little more confused than usual.      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   • Impaired glucose tolerance 10/11/2016   • Hyperlipidemia 10/11/2016   • Abdominal wall hernia 10/11/2016   • Nonrheumatic aortic valve stenosis 10/14/2022     Resolved Ambulatory Problems     Diagnosis Date Noted   • No Resolved Ambulatory Problems     Past Medical History:   Diagnosis Date   • 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   • IGT (impaired glucose tolerance)          PAST SURGICAL HISTORY  Past Surgical History:   Procedure Laterality Date   • ANKLE ARTHROPLASTY Right    • BREAST BIOPSY Left 01/2019    due to Calcifications- Normal   • CARPAL TUNNEL RELEASE     • CHOLECYSTECTOMY     • COLONOSCOPY N/A 2/21/2017    Procedure: COLONOSCOPY TO TRANSVERSE COLON WITH COLD SNARE POLYPECTOMY;  Surgeon: Carlos" DONOVAN Payton MD;  Location: [facility] ENDOSCOPY;  Service:    • GANGLION CYST EXCISION Left     x 2   • INCISIONAL HERNIA REPAIR  04/15/2019    Dr Shyla Bartholomew         FAMILY HISTORY  Family History   Problem Relation Age of Onset   • COPD Brother          SOCIAL HISTORY  Social History     Socioeconomic History   • Marital status:    Tobacco Use   • Smoking status: Never   • Smokeless tobacco: Never   Substance and Sexual Activity   • Alcohol use: No   • Drug use: No   • Sexual activity: Defer         ALLERGIES  Fluvirin [influenza virus vaccine split]        REVIEW OF SYSTEMS  Review of Systems   Limited due to altered mental status  .       PHYSICAL EXAM    I have reviewed the triage vital signs and nursing notes.    ED Triage Vitals [11/03/22 1556]   Temp Heart Rate Resp BP SpO2   99 °F (37.2 °C) 120 18 151/83 98 %      Temp src Heart Rate Source Patient Position BP Location FiO2 (%)   Tympanic Monitor -- -- --       Physical Exam  GENERAL: Awake and alert, very pleasant but confused.  Not hallucinating current  HENT: nares patent, NCAT  EYES: no scleral icterus, PERRL, EOMI  CV: regular rhythm, regular rate  RESPIRATORY: normal effort  ABDOMEN: soft  MUSCULOSKELETAL: no deformity  NEURO: alert, moves all extremities, follows commands.  Nonfocal neuro exam and not currently hallucinating.  She is alert but has some difficulty with recall and answering some question  SKIN: warm, dry        LAB RESULTS  Recent Results (from the past 24 hour(s))   Comprehensive Metabolic Panel    Collection Time: 11/03/22  5:00 PM    Specimen: Arm, Left; Blood   Result Value Ref Range    Glucose 102 (H) 65 - 99 mg/dL    BUN 20 8 - 23 mg/dL    Creatinine 0.91 0.57 - 1.00 mg/dL    Sodium 140 136 - 145 mmol/L    Potassium 4.1 3.5 - 5.2 mmol/L    Chloride 103 98 - 107 mmol/L    CO2 23.4 22.0 - 29.0 mmol/L    Calcium 10.7 (H) 8.6 - 10.5 mg/dL    Total Protein 6.7 6.0 - 8.5 g/dL    Albumin 4.10 3.50 - 5.20 g/dL    ALT (SGPT) 13 1 -  33 U/L    AST (SGOT) 23 1 - 32 U/L    Alkaline Phosphatase 108 39 - 117 U/L    Total Bilirubin 1.2 0.0 - 1.2 mg/dL    Globulin 2.6 gm/dL    A/G Ratio 1.6 g/dL    BUN/Creatinine Ratio 22.0 7.0 - 25.0    Anion Gap 13.6 5.0 - 15.0 mmol/L    eGFR 62.7 >60.0 mL/min/1.73   Troponin    Collection Time: 11/03/22  5:00 PM    Specimen: Arm, Left; Blood   Result Value Ref Range    Troponin T 0.017 0.000 - 0.030 ng/mL   Magnesium    Collection Time: 11/03/22  5:00 PM    Specimen: Arm, Left; Blood   Result Value Ref Range    Magnesium 1.9 1.6 - 2.4 mg/dL   Green Top (Gel)    Collection Time: 11/03/22  5:00 PM   Result Value Ref Range    Extra Tube Hold for add-ons.    Lavender Top    Collection Time: 11/03/22  5:00 PM   Result Value Ref Range    Extra Tube hold for add-on    Gold Top - SST    Collection Time: 11/03/22  5:00 PM   Result Value Ref Range    Extra Tube Hold for add-ons.    Light Blue Top    Collection Time: 11/03/22  5:00 PM   Result Value Ref Range    Extra Tube Hold for add-ons.    CBC Auto Differential    Collection Time: 11/03/22  5:00 PM    Specimen: Arm, Left; Blood   Result Value Ref Range    WBC 5.29 3.40 - 10.80 10*3/mm3    RBC 4.32 3.77 - 5.28 10*6/mm3    Hemoglobin 13.5 12.0 - 15.9 g/dL    Hematocrit 39.9 34.0 - 46.6 %    MCV 92.4 79.0 - 97.0 fL    MCH 31.3 26.6 - 33.0 pg    MCHC 33.8 31.5 - 35.7 g/dL    RDW 12.4 12.3 - 15.4 %    RDW-SD 42.0 37.0 - 54.0 fl    MPV 8.7 6.0 - 12.0 fL    Platelets 308 140 - 450 10*3/mm3    Neutrophil % 71.5 42.7 - 76.0 %    Lymphocyte % 16.6 (L) 19.6 - 45.3 %    Monocyte % 8.9 5.0 - 12.0 %    Eosinophil % 1.3 0.3 - 6.2 %    Basophil % 1.3 0.0 - 1.5 %    Immature Grans % 0.4 0.0 - 0.5 %    Neutrophils, Absolute 3.78 1.70 - 7.00 10*3/mm3    Lymphocytes, Absolute 0.88 0.70 - 3.10 10*3/mm3    Monocytes, Absolute 0.47 0.10 - 0.90 10*3/mm3    Eosinophils, Absolute 0.07 0.00 - 0.40 10*3/mm3    Basophils, Absolute 0.07 0.00 - 0.20 10*3/mm3    Immature Grans, Absolute 0.02 0.00 - 0.05  10*3/mm3    nRBC 0.0 0.0 - 0.2 /100 WBC   ECG 12 Lead ED Triage Standing Order; Weak / Dizzy / AMS    Collection Time: 11/03/22  5:05 PM   Result Value Ref Range    QT Interval 377 ms   Urinalysis With Microscopic If Indicated (No Culture) - Urine, Clean Catch    Collection Time: 11/03/22  6:17 PM    Specimen: Urine, Clean Catch   Result Value Ref Range    Color, UA Yellow Yellow, Straw    Appearance, UA Cloudy (A) Clear    pH, UA 6.0 5.0 - 8.0    Specific Gravity, UA 1.020 1.005 - 1.030    Glucose, UA Negative Negative    Ketones, UA 15 mg/dL (1+) (A) Negative    Bilirubin, UA Negative Negative    Blood, UA Trace (A) Negative    Protein, UA Trace (A) Negative    Leuk Esterase, UA Large (3+) (A) Negative    Nitrite, UA Negative Negative    Urobilinogen, UA 1.0 E.U./dL 0.2 - 1.0 E.U./dL   Urinalysis, Microscopic Only - Urine, Clean Catch    Collection Time: 11/03/22  6:17 PM    Specimen: Urine, Clean Catch   Result Value Ref Range    RBC, UA 6-12 (A) None Seen, 0-2 /HPF    WBC, UA Too Numerous to Count (A) None Seen, 0-2 /HPF    Bacteria, UA None Seen None Seen /HPF    Squamous Epithelial Cells, UA 0-2 None Seen, 0-2 /HPF    Hyaline Casts, UA 7-12 None Seen /LPF    Methodology Automated Microscopy        Ordered the above labs and independently reviewed the results.        RADIOLOGY  CT Head Without Contrast    Result Date: 11/3/2022  CT HEAD WITHOUT CONTRAST  CLINICAL HISTORY: Altered mental status.  TECHNIQUE: CT scan of the head was obtained with 3 mm axial soft tissue algorithm images. No intravenous contrast was administered. Sagittal and coronal reconstructions were obtained.  COMPARISON: No previous similar studies are available for comparison.  FINDINGS:   There is a small amount of peripheral subarachnoid hemorrhage within a sulcus within the superior aspect of the left parietal lobe.  Otherwise, the ventricles, sulci, and cisterns are age-appropriate. The basal ganglia and thalami are unremarkable in  appearance. There are mild changes of chronic small vessel ischemic phenomena. The posterior fossa structures are within normal limits.       There is a small amount of peripheral subarachnoid hemorrhage within a sulcus of the superior aspect of the left parietal lobe.  These findings were discussed with Dr. Cifuentes on 11/03/2022 at approximately 7:50 PM.  Radiation dose reduction techniques were utilized, including automated exposure control and exposure modulation based on body size.  This report was finalized on 11/3/2022 8:39 PM by Dr. Martin Perez M.D.      XR Chest 1 View    Result Date: 11/3/2022  XR CHEST 1 VW-  HISTORY: Female who is 82 years-old,  weakness  TECHNIQUE: Frontal view of the chest  COMPARISON: None available  FINDINGS: Here size is normal. Aorta is calcified. Pulmonary vasculature is unremarkable. No focal pulmonary consolidation, pleural effusion, or pneumothorax. No acute osseous process.      No focal pulmonary consolidation. Follow-up as clinical indications persist.  This report was finalized on 11/3/2022 5:43 PM by Dr. Duke Campuzano M.D.        I ordered the above noted radiological studies. Reviewed by me and discussed with radiologist.  See dictation for official radiology interpretation.      PROCEDURES    Procedures      MEDICATIONS GIVEN IN ER    Medications   sodium chloride 0.9 % flush 10 mL (has no administration in time range)   sodium chloride 0.9 % flush 10 mL (10 mL Intravenous Given 11/3/22 2215)   sodium chloride 0.9 % flush 10 mL (has no administration in time range)   melatonin tablet 5 mg (has no administration in time range)   acetaminophen (TYLENOL) tablet 650 mg (has no administration in time range)   cefTRIAXone (ROCEPHIN) 1 g in sodium chloride 0.9 % 100 mL IVPB-VTB (0 g Intravenous Stopped 11/3/22 2006)         PROGRESS, DATA ANALYSIS, CONSULTS, AND MEDICAL DECISION MAKING    All labs have been independently reviewed by me.  All radiology studies have been  reviewed by me and discussed with radiologist dictating the report.   EKG's independently viewed and interpreted by me.  Discussion below represents my analysis of pertinent findings related to patient's condition, differential diagnosis, treatment plan and final disposition.        ED Course as of 11/04/22 0328   Thu Nov 03, 2022   1803 18:04 EDT  ED first look.  Patient presents for evaluation of altered mental status.  Patient normally lives at home alone and is able to function without difficulty.  Patient found by family to be more confused.  Has been seeing things and hallucinating.  EMS was out initially and she refused transport.  Continues to be somewhat confused.  Labs and imaging ordered [SL]   1946 Very pleasant elderly female who is here with her daughter at bedside and we had nice conversation.  She has not felt particularly ill of late in any way, but today she had 2 episodes where it seems clear that she had visual hallucinations.  The only thing we can uncover from her work-up and history is that she potentially has a UTI [DP]   Fri Nov 04, 2022   0325 CBC and chemistry are surprisingly normal, urinalysis does appear to show UTI [DP]   0326 Troponin unremarkable and magnesium normal [DP]   0326 EKG at 1939  Sinus rhythm at 96  Normal MI and QT, there is a bifascicular block which was not present 2017, however there are no ST segment changes to suggest acute ischemia [DP]   0327 CT scan of the head without contrast shows small area of increased density in the left parietal lobe which radiology feels could represent a small area of subarachnoid hemorrhage although it does not look traumatic.  In fact, it has more of an appearance of amyloid angiopathy with a microbleed [DP]   0327 Certainly no tPA indicated with a suggestion of subarachnoid hemorrhage [DP]   0327 As above, I spoke with Dr. TOBIAS and the family about all these findings.  Amyloid angiopathy would certainly explain the memory issues and  developing dementia.  She is currently very cooperative and not hallucinating.    I talked to the daughter and they will be admitted to a telemetry bed for further [DP]      ED Course User Index  [DP] Dameon Cifuentes MD  [SL] Davon Winston MD           PPE: The patient wore a surgical mask throughout the entire patient encounter. I wore an N95.    AS OF 03:28 EDT VITALS:    BP - 149/89  HR - 89  TEMP - 98.6 °F (37 °C) (Oral)  O2 SATS - 92%        DIAGNOSIS  Final diagnoses:   Visual hallucination   Acute UTI   Abnormal head CT         DISPOSITION  Admit           Dameon Cifuentes MD  11/04/22 1077

## 2022-11-03 NOTE — ED NOTES
Nursing report ED to floor  Cesilia Lutz  82 y.o.  female    HPI :   Chief Complaint   Patient presents with    Altered Mental Status       Admitting doctor:   Quinn Cabrera MD    Admitting diagnosis:   There were no encounter diagnoses.    Code status:   Current Code Status       Date Active Code Status Order ID Comments User Context       Not on file            Allergies:   Fluvirin [influenza virus vaccine split]    Isolation:   No active isolations    Intake and Output  No intake or output data in the 24 hours ending 11/03/22 1948    Weight:   There were no vitals filed for this visit.    Most recent vitals:   Vitals:    11/03/22 1801 11/03/22 1831 11/03/22 1838 11/03/22 1859   BP:  134/77     Pulse: 93 92 88 101   Resp:       Temp:       TempSrc:       SpO2: 98% 99% 97% 99%       Active LDAs/IV Access:   Lines, Drains & Airways       Active LDAs       Name Placement date Placement time Site Days    Peripheral IV 11/03/22 1936 Right Antecubital 11/03/22 1936  Antecubital  less than 1                    Labs (abnormal labs have a star):   Labs Reviewed   COMPREHENSIVE METABOLIC PANEL - Abnormal; Notable for the following components:       Result Value    Glucose 102 (*)     Calcium 10.7 (*)     All other components within normal limits    Narrative:     GFR Normal >60  Chronic Kidney Disease <60  Kidney Failure <15    The GFR formula is only valid for adults with stable renal function between ages 18 and 70.   URINALYSIS W/ MICROSCOPIC IF INDICATED (NO CULTURE) - Abnormal; Notable for the following components:    Appearance, UA Cloudy (*)     Ketones, UA 15 mg/dL (1+) (*)     Blood, UA Trace (*)     Protein, UA Trace (*)     Leuk Esterase, UA Large (3+) (*)     All other components within normal limits   CBC WITH AUTO DIFFERENTIAL - Abnormal; Notable for the following components:    Lymphocyte % 16.6 (*)     All other components within normal limits   URINALYSIS, MICROSCOPIC ONLY - Abnormal; Notable for the  following components:    RBC, UA 6-12 (*)     WBC, UA Too Numerous to Count (*)     All other components within normal limits   TROPONIN (IN-HOUSE) - Normal    Narrative:     Troponin T Reference Range:  <= 0.03 ng/mL-   Negative for AMI  >0.03 ng/mL-     Abnormal for myocardial necrosis.  Clinicians would have to utilize clinical acumen, EKG, Troponin and serial changes to determine if it is an Acute Myocardial Infarction or myocardial injury due to an underlying chronic condition.       Results may be falsely decreased if patient taking Biotin.     MAGNESIUM - Normal   RAINBOW DRAW    Narrative:     The following orders were created for panel order [place] Draw.  Procedure                               Abnormality         Status                     ---------                               -----------         ------                     Green Top (Gel)[[id number]]                                  Final result               Lavender Top[[id number]]                                     Final result               Gold Top - SST[[id number]]                                   Final result               Light Blue Top[[id number]]                                   Final result                 Please view results for these tests on the individual orders.   URINALYSIS W/ CULTURE IF INDICATED   POCT GLUCOSE FINGERSTICK   CBC AND DIFFERENTIAL    Narrative:     The following orders were created for panel order CBC & Differential.  Procedure                               Abnormality         Status                     ---------                               -----------         ------                     CBC Auto Differential[[id number]]        Abnormal            Final result                 Please view results for these tests on the individual orders.   GREEN TOP   LAVENDER TOP   GOLD TOP - SST   LIGHT BLUE TOP       EKG:   ECG 12 Lead ED Triage Standing Order; Weak / Dizzy / AMS   Final Result   HEART RATE= 96  bpm   RR Interval= 625   ms   DE Interval= 131  ms   P Horizontal Axis= 22  deg   P Front Axis= 42  deg   QRSD Interval= 135  ms   QT Interval= 377  ms   QRS Axis= -80  deg   T Wave Axis= 39  deg   - ABNORMAL ECG -   Sinus rhythm   RBBB and LAFB   No Prior Tracing for Comparison   Electronically Signed By: Norris Sood ([facility]) 03-Nov-2022 19:39:14   Date and Time of Study: 2022-11-03 17:05:27          Meds given in ED:   Medications   sodium chloride 0.9 % flush 10 mL (has no administration in time range)   cefTRIAXone (ROCEPHIN) 1 g in sodium chloride 0.9 % 100 mL IVPB-VTB (1 g Intravenous New Bag 11/3/22 1936)       Imaging results:  XR Chest 1 View    Result Date: 11/3/2022  No focal pulmonary consolidation. Follow-up as clinical indications persist.  This report was finalized on 11/3/2022 5:43 PM by Dr. Duke Campuzano M.D.       Ambulatory status:   - Patient ambulates with assist    Social issues:   Social History     Socioeconomic History    Marital status:    Tobacco Use    Smoking status: Never    Smokeless tobacco: Never   Substance and Sexual Activity    Alcohol use: No    Drug use: No    Sexual activity: Defer       NIH Stroke Scale:         Melony Apodaca RN  11/03/22 19:48 EDT     Call [phone] with questions

## 2022-11-04 NOTE — NURSING NOTE
Pt became increasingly confused/restless towards end of shift. Pt attempted to leave unit multiple times while screaming at top of lungs throughout hallway. MD notified and order for restraints placed. LIZZIE.

## 2022-11-04 NOTE — H&P
Patient Care Team:  Quinn Cabrera MD as PCP - General (Internal Medicine)  Quinn Cabrera MD as PCP - Internal Medicine (Internal Medicine)  Xiomara Díaz MD as Consulting Physician (Obstetrics and Gynecology)    Chief complaint   Chief Complaint   Patient presents with   • Altered Mental Status        Subjective     Patient is a 82 y.o. female presents with confusion and visual hallucinations.  She told her daughter on the phone that she thought someone was in the house and also mistook an extension cord for a snake.  She has had notable memory problems over the past few months.  This is also been notable at her last few office visits where she is always pleasant and conversive but somewhat evasive regarding direct questions.  The question of a UTI was noted in the emergency room but she denies any urinary symptoms.  Unfortunately that was a clean-catch specimen making its reliability suspect.  A CT scan of the brain in the emergency room suggested amyloid angiopathy which would be a good reason for her memory disturbance.  There is mention of a tiny sliver hemorrhage near the sulcus of the superior aspect of the left parietal lobe that raise concern for a tiny subarachnoid hemorrhage.  She was given a single dose of Rocephin in the emergency room.    Review of Systems  History of hypertension.  Hyperlipidemia.  Impaired glucose tolerance.  Mild aortic stenosis.  No cardiac complaints.  No GI complaints.  No  complaints.    All other pertinent items are noted in HPI, all other systems reviewed and negative    History    Current Facility-Administered Medications:   •  acetaminophen (TYLENOL) tablet 650 mg, 650 mg, Oral, Q4H PRN, Quinn Cabrera MD  •  hydroCHLOROthiazide (HYDRODIURIL) tablet 12.5 mg, 12.5 mg, Oral, Daily, Quinn Cabrera MD  •  melatonin tablet 5 mg, 5 mg, Oral, Nightly PRN, Quinn Cabrera MD  •  metoprolol succinate XL (TOPROL-XL) 24 hr tablet 50 mg, 50 mg, Oral, Q24H, Quinn Cabrera  MD LETY  •  potassium chloride (K-DUR,KLOR-CON) ER tablet 20 mEq, 20 mEq, Oral, Daily, Quinn Cabrera MD  •  rosuvastatin (CRESTOR) tablet 20 mg, 20 mg, Oral, Nightly, Quinn Cabrera MD  •  sodium chloride 0.9 % flush 10 mL, 10 mL, Intravenous, PRN, Davon Winston MD  •  sodium chloride 0.9 % flush 10 mL, 10 mL, Intravenous, Q12H, Quinn Cabrera MD, 10 mL at 11/03/22 2215  •  sodium chloride 0.9 % flush 10 mL, 10 mL, Intravenous, PRN, Quinn Cabrera MD  •  vitamin B-12 (CYANOCOBALAMIN) tablet 100 mcg, 100 mcg, Oral, Daily, Quinn Cabrera MD  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   • Hyperlipidemia    • Hypertension    • IGT (impaired glucose tolerance)    • PVC (premature ventricular contraction)      Past Surgical History:   Procedure Laterality Date   • ANKLE ARTHROPLASTY Right    • BREAST BIOPSY Left 01/2019    due to Calcifications- Normal   • CARPAL TUNNEL RELEASE     • CHOLECYSTECTOMY     • COLONOSCOPY N/A 2/21/2017    Procedure: COLONOSCOPY TO TRANSVERSE COLON WITH COLD SNARE POLYPECTOMY;  Surgeon: Carlos Payton MD;  Location: [facility] ENDOSCOPY;  Service:    • GANGLION CYST EXCISION Left     x 2   • INCISIONAL HERNIA REPAIR  04/15/2019    Dr Shyla Bartholomew     Family History   Problem Relation Age of Onset   • COPD Brother      Social History     Tobacco Use   • Smoking status: Never   • Smokeless tobacco: Never   Substance Use Topics   • Alcohol use: No   • Drug use: No     Medications Prior to Admission   Medication Sig Dispense Refill Last Dose   • Cyanocobalamin (VITAMIN B 12 PO) Take 1 tablet by mouth Daily.      • estradiol 0.25 MG/0.25GM gel Place  on the skin as directed by provider Daily.      • medroxyPROGESTERone (PROVERA) 2.5 MG tablet Take 2.5 mg by mouth Daily.      • metoprolol succinate XL (TOPROL-XL) 50 MG 24 hr tablet Take 1 tablet by mouth once daily 90 tablet 0    • potassium chloride  (K-DUR,KLOR-CON) 10 MEQ CR tablet Take 2 tablets by mouth once daily 180 tablet 0    • rosuvastatin (CRESTOR) 20 MG tablet Take 1 tablet by mouth once daily 90 tablet 1    • triamterene-hydrochlorothiazide (DYAZIDE) 37.5-25 MG per capsule Take 1 capsule by mouth once daily in the morning 90 capsule 0      Allergies:  Fluvirin [influenza virus vaccine split]    Objective     Vital Signs  Temp:  [98.3 °F (36.8 °C)-99 °F (37.2 °C)] 98.3 °F (36.8 °C)  Heart Rate:  [] 76  Resp:  [18] 18  BP: (126-151)/(46-89) 137/73    Physical Exam:      General Appearance:    Alert, pleasant, cooperative, in no acute distress   Head:    Normocephalic, without obvious abnormality, atraumatic   Eyes:            Lids and lashes normal, conjunctivae and sclerae normal, no   icterus, no pallor, corneas clear, PERRLA   Ears:     Throat:    Neck:   No adenopathy, supple, trachea midline, no thyromegaly, no     carotid bruit, no JVD   Back:        Lungs:     Clear to auscultation,respirations regular, even and                   unlabored    Heart:    Regular rhythm and normal rate, normal S1 and S2, no            murmur, no gallop, no rub, no click   Breast Exam:    Deferred   Abdomen:     Normal bowel sounds, no masses, no organomegaly, soft        non-tender, non-distended, no guarding, no rebound                 tenderness   Genitalia:    Deferred   Extremities:   Moves all extremities well, no edema, no cyanosis, no              redness   Pulses:   Pulses palpable and equal bilaterally   Skin:   No bleeding, bruising or rash   Lymph nodes:   No palpable adenopathy   Neurologic:   Cranial nerves 2 - 12 grossly intact, cerebellar finger-to-nose intact, motor strength symmetric and normal, gets the year but not the month and date and gets only Biden with serial presidents questioning         Results Review:    I reviewed the patient's new clinical results.  I personally viewed and interpreted the patient's EKG/Telemetry data  Discussed  with The emergency room physician    Assessment & Plan       Acute UTI    Visual hallucination  Likely dementia related to amyloid angiopathy  Hypertension controlled  Hyperlipidemia  History of mild aortic stenosis    I think the visual hallucinations and mild confusion are consistent with her underlying dementia.  This was disconcerting to the daughter since her dementia has previously been unrecognized.  The tiny amount of bleeding from near the superior sulcus would be consistent with her underlying amyloid angiopathy.  In this regard we will need to be sure to avoid anticoagulants in the future given the increased risks of cranial hemorrhage.  Its not clear if she has a UTI since this was a clean-catch specimen and even if she does its not at all clear that this is contributing to her sudden confusion.  We will hold off on antibiotics until we get the culture results.  We will get neurology consult regarding the bleeding and whether it is of any clinical significance or needs any attention.    I discussed the patients findings and my recommendations with patient.     Quinn Cabrera MD  11/04/22  07:56 EDT    Dragon disclaimer:   Much of this encounter note is an electronic transcription/translation of spoken language to printed text. The electronic translation of spoken language may permit erroneous, or at times, nonsensical words or phrases to be inadvertently transcribed; Although I have reviewed the note for such errors, some may still exist.

## 2022-11-04 NOTE — PROGRESS NOTES
Discharge Planning Assessment  [facility]     Patient Name: Cesilia Lutz  MRN: [id number]  Today's Date: 11/4/2022    Admit Date: 11/3/2022    Plan: SNF referrals pending   Discharge Needs Assessment     Row Name 11/04/22 8902       Living Environment    People in Home alone    Current Living Arrangements apartment    Primary Care Provided by self    Provides Primary Care For no one    Family Caregiver if Needed child(aurea), adult    Family Caregiver Names Dtr Sindhu Lutz [phone]; Regla Bailey, [phone]    Quality of Family Relationships helpful;involved;supportive    Able to Return to Prior Arrangements yes       Resource/Environmental Concerns    Resource/Environmental Concerns none       Transition Planning    Patient/Family Anticipates Transition to inpatient rehabilitation facility    Patient/Family Anticipated Services at Transition skilled nursing;rehabilitation services    Transportation Anticipated agency;family or friend will provide       Discharge Needs Assessment    Equipment Currently Used at Home walker, rolling;cane, straight;shower chair    Concerns to be Addressed discharge planning    Outpatient/Agency/Support Group Needs skilled nursing facility    Discharge Facility/Level of Care Needs nursing facility, skilled    Provided Post Acute Provider List? Yes    Post Acute Provider List Nursing Home    Discharge Coordination/Progress SNF referrals pending               Discharge Plan     Row Name 11/04/22 1361       Plan    Plan SNF referrals pending    Patient/Family in Agreement with Plan yes    Plan Comments CCP spoke with pt's daughter (Sindhu Lutz, [phone]) re: d/c planning. Pt resides alone in a ground floor apartment, and has cane, walker and shower chair at home. Pt has no h/o HH/SNF, and, per daughter, has had no previous instances of acute confusion/hallucinations which led to current admission. Pt has had gradual memory loss over the past year per daughter, but has been able to care  for herself effectively at home. Per Neurology recommendation, daughter agreeable to SNF referrals for memory care placement (placed to area facilities) and states pt has monthly care home income as well as approximately $80,000 in savings to contribute to her care needs. CCP to follow for SNF manuela. Yani Marrufo LCSW              Continued Care and Services - Admitted Since 11/3/2022     Destination     Service Provider Request Status Selected Services Address Phone Fax Patient Preferred    [facility] Pending - Request Sent N/A [address] [facility] [id number] [phone] --    [facility] Pending - Request Sent N/A [address] [facility] [address] [phone] --    Critical access hospital Pending - Request Sent N/A [address] [phone] --    [facility] Pending - Request Sent N/A [address] [facility] [address] [phone] --    [facility] Pending - Request Sent N/A [address] [facility] [address] [phone] --    [facility], [facility] Pending - Request Sent N/A [address] [facility] [phone] --    [facility] Pending - Request Sent N/A [address] [id number] [phone] --    [facility] Pending - Request Sent N/A [address] [facility] [id number] [phone] --    [facility] Pending - Request Sent N/A [address] [facility] [facility] [address] [address] [phone] --    [facility] Pending - Request Sent N/A [address] [facility] [address] [phone] --    [facility] Pending - Request Sent N/A [address] [facility] [address] [phone] --              Expected  Discharge Date and Time     Expected Discharge Date Expected Discharge Time    Nov 8, 2022          Demographic Summary     Row Name 11/04/22 1647       General Information    Admission Type observation    Arrived From home    Required Notices Provided Observation Status Notice    Referral Source admission list    Reason for Consult discharge planning    Preferred Language English               Functional Status     Row Name 11/04/22 1647       Functional Status    Usual Activity Tolerance good    Current Activity Tolerance good       Functional Status, IADL    Medications independent    Meal Preparation independent    Housekeeping independent    Laundry independent    Shopping independent               Psychosocial    No documentation.                Abuse/Neglect    No documentation.                Legal    No documentation.                Substance Abuse    No documentation.                Patient Forms    No documentation.                   Renee Marrufo LCSW

## 2022-11-04 NOTE — DISCHARGE PLACEMENT REQUEST
"Rossy Lutz (82 y.o. Female)     Date of Birth   1940    Social Security Number       Address   [address] [facility] [address] [place] [address]    Home Phone   [phone]    MRN   [id number]       L[employer] day   [employer]    Marital Status                               Admission Date   11/3/22    Admission Type   Emergency    Admitting Provider   Quinn Cabrera MD    Attending Provider   Quinn Cabrera MD    Department, Room/Bed   [address], P592/1       Discharge Date       Discharge Disposition       Discharge Destination                               Attending Provider: Quinn Cabrera MD    Allergies: Fluvirin [Influenza Virus Vaccine Split]    Isolation: None   Infection: None   Code Status: CPR    Ht: 149.9 cm (59\")   Wt: 51.7 kg (113 lb 15.7 oz)    Admission Cmt: None   Principal Problem: Acute UTI [N39.0]                 Active Insurance as of 11/3/2022     Primary Coverage     Payor Plan Insurance Group Employer/Plan Group    ANTHEM MEDICARE REPLACEMENT ANTHEM MEDICARE ADVANTAGE [id number]     Payor Plan Address Payor Plan Phone Number Payor Plan Fax Number Effective Dates    [address] [phone]  1/1/2017 - None Entered    [facility] [address]       Subscriber Name Subscriber Birth Date Member ID       ROSSY LUTZ 1940 [id number]                 Emergency Contacts      (Rel.) Home Phone Work Phone Mobile Phone    Sindhu Lutz (Daughter) [phone] -- --    ANDRADEFRANKLIN (Daughter) -- -- [phone]              "

## 2022-11-04 NOTE — CONSULTS
"Neurology Consult Note    Consult Date: 11/4/2022    Referring MD: Quinn Cabrera MD    Reason for Consult I have been asked to see the patient in neurological consultation to render advice and opinion regarding hallucinations, abnormal head CT    Cesilia Lutz is a 82 y.o. female with hypertension, hyperlipidemia, no prior diagnosis of dementia.  She developed delusions that someone was breaking into her home as well as some hallucinations of snakes in her house.  She was ultimately brought to the hospital for the symptoms where a CT scan showed a thin sulcal subarachnoid hemorrhage on the left side.  She was admitted for further evaluation.  She has remained pleasantly confused but delusional.  She denies headache or unilateral weakness or numbness.  No witnessed seizures.    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   • Hyperlipidemia    • Hypertension    • IGT (impaired glucose tolerance)    • PVC (premature ventricular contraction)        ROS:  No fevers, chills  No weakness, numbness, + confusion    Exam  /62 (BP Location: Left arm, Patient Position: Sitting)   Pulse 88   Temp 98.9 °F (37.2 °C) (Oral)   Resp 16   Ht 149.9 cm (59\")   Wt 51.7 kg (113 lb 15.7 oz)   SpO2 96%   BMI 23.02 kg/m²   Gen: NAD, vitals reviewed  MS: Oriented x2, memory impaired, moderately delusional, normal attention/concentration, markedly impaired semantic and verbal fluency and clock draw.  CN: visual acuity grossly normal, PERRL, EOMI, no facial droop, no dysarthria  Motor: 5/5 throughout upper and lower extremities, normal tone    DATA:    Lab Results   Component Value Date    GLUCOSE 102 (H) 11/03/2022    CALCIUM 10.7 (H) 11/03/2022     11/03/2022    K 4.1 11/03/2022    CO2 23.4 11/03/2022     11/03/2022    BUN 20 11/03/2022    CREATININE 0.91 11/03/2022    EGFRIFAFRI 62 02/18/2022    EGFRIFNONA 54 (L) 02/18/2022    BCR 22.0 " 11/03/2022    ANIONGAP 13.6 11/03/2022     Lab Results   Component Value Date    WBC 5.29 11/03/2022    HGB 13.5 11/03/2022    HCT 39.9 11/03/2022    MCV 92.4 11/03/2022     11/03/2022       Lab review: B12, TSH ordered    Imaging review: MRI brain with and without contrast ordered    Diagnoses:  Subarachnoid hemorrhage, spontaneous  Delusions  Alzheimer's dementia, late onset, with behavioral disturbance    Comment: I agree with the suspicion for cerebral amyloid angiopathy on the basis of her CT findings.  We will check MRI to try to confirm that.  I think she probably has underlying Alzheimer's disease and this may be moderate to advanced in severity based on her extremely impaired cognitive testing at the bedside.    PLAN:  -MRI brain with without contrast to evaluate for CAA  -Consideration of starting Aricept  -May need placement; I suspect she has advanced cognitive impairment that is somewhat masked by her extremely pleasant demeanor    Discussed with Dr. Cabrera

## 2022-11-05 PROBLEM — G93.41 METABOLIC ENCEPHALOPATHY: Status: ACTIVE | Noted: 2022-01-01

## 2022-11-05 PROBLEM — I60.9 SUBARACHNOID HEMORRHAGE (HCC): Status: ACTIVE | Noted: 2022-01-01

## 2022-11-05 PROBLEM — F03.B3 MODERATE DEMENTIA WITH MOOD DISTURBANCE (HCC): Status: ACTIVE | Noted: 2022-01-01

## 2022-11-05 PROBLEM — I68.0 CEREBRAL AMYLOID ANGIOPATHY (HCC): Status: ACTIVE | Noted: 2022-01-01

## 2022-11-05 PROBLEM — E85.4 CEREBRAL AMYLOID ANGIOPATHY (HCC): Status: ACTIVE | Noted: 2022-01-01

## 2022-11-05 PROBLEM — I95.9 HYPOTENSION: Status: ACTIVE | Noted: 2022-01-01

## 2022-11-05 NOTE — PLAN OF CARE
Problem: Adult Inpatient Plan of Care  Goal: Plan of Care Review  Outcome: Ongoing, Progressing  Flowsheets (Taken 11/5/2022 0538)  Progress: no change  Plan of Care Reviewed With: patient  Outcome Evaluation: Patient alert to self only, states visual hallucinations, agitated at times, restraints in use for patient safety, patient has been awake all night and is very restless, purewick in use, turn q2hrs, will continue to monitor.     Problem: Restraint, Nonbehavioral (Nonviolent)  Goal: Absence of Harm or Injury  Outcome: Ongoing, Progressing     Problem: Restraint, Nonbehavioral (Nonviolent)  Goal: Absence of Harm or Injury  Intervention: Implement Least Restrictive Safety Strategies  Recent Flowsheet Documentation  Taken 11/5/2022 0400 by Ana Lilia Juan RN  Medical Device Protection: IV pole/bag removed from visual field  Less Restrictive Alternative:  • appropriate expression promoted  • bed alarm in use  • calming techniques promoted  • coping techniques promoted  • environment adjusted  • positive reinforcement provided  • sensory stimulation limited  De-Escalation Techniques:  • appropriate behavior reinforced  • diversional activity encouraged  • increased round frequency  • quiet time facilitated  • reoriented  • stimulation decreased  Diversional Activities: music  Taken 11/5/2022 0200 by Ana Lilia Juan, RN  Medical Device Protection: IV pole/bag removed from visual field  Less Restrictive Alternative:  • bed alarm in use  • calming techniques promoted  • environment adjusted  • sensory stimulation limited  De-Escalation Techniques:  • appropriate behavior reinforced  • increased round frequency  • medication administered  • reoriented  Diversional Activities: music  Taken 11/5/2022 0000 by Ana Lilia Juan, RN  Medical Device Protection: IV pole/bag removed from visual field  Less Restrictive Alternative:  • bed alarm in use  • calming techniques promoted  • coping techniques  promoted  • emotional support provided  • environment adjusted  • sensory stimulation limited  De-Escalation Techniques:  • appropriate behavior reinforced  • increased round frequency  • reoriented  • stimulation decreased  • quiet time facilitated  Diversional Activities: music  Taken 11/4/2022 2200 by Ana Lilia Juan, RN  Medical Device Protection: IV pole/bag removed from visual field  Less Restrictive Alternative:  • bed alarm in use  • calming techniques promoted  • emotional support provided  • environment adjusted  • sensory stimulation limited  De-Escalation Techniques:  • appropriate behavior reinforced  • increased round frequency  • quiet time facilitated  • reoriented  • stimulation decreased  Diversional Activities: (wanted tv off) other (see comments)  Taken 11/4/2022 2030 by Ana Lilia Juan, RN  Medical Device Protection: IV pole/bag removed from visual field  Diversional Activities: television  Taken 11/4/2022 2000 by Ana Lilia Juan, RN  Medical Device Protection: IV pole/bag removed from visual field  Less Restrictive Alternative:  • bed alarm in use  • calming techniques promoted  • emotional support provided  • environment adjusted  • family presence promoted  • sensory stimulation limited  De-Escalation Techniques:  • appropriate behavior reinforced  • family involvement requested  • quiet time facilitated  • reoriented  • stimulation decreased  Diversional Activities: television

## 2022-11-05 NOTE — PROGRESS NOTES
"DOS: 2022  NAME: Cesilia Lutz   : 1940  PCP: Quinn Cabrera MD  Chief Complaint   Patient presents with   • Altered Mental Status       Chief complaint: delusions  Subjective: Patient lying sideways with legs hanging off the bed.  She is smiling and watching the ceiling.  She cannot tell me why she is in the hospital.  She names mask but not active participant for exam, confused mumbled speech    Objective:  Vital signs: /70 (BP Location: Left arm, Patient Position: Lying)   Pulse 102   Temp 97.8 °F (36.6 °C) (Oral)   Resp 18   Ht 149.9 cm (59\")   Wt 51.7 kg (113 lb 15.7 oz)   SpO2 95%   BMI 23.02 kg/m²      Gen: NAD, vitals reviewed  MS: Disoriented to even self, seems to be having visual hallucinations, testing language difficult with degree of confusion, does name mask, confabulation, impaired attention/concentration, no overt neglect   CN: Thinks to threat, PERRL, EOMI, no facial droop, intermittent dysarthria  Motor: Moves extremities spontaneously and equally  Sensory: intact to light touch all 4 ext.  Withdrawal throughout  Reflexes: +2, negative Babinski's    ROS:  No weakness, numbness  No fevers, chills      Laboratory results:  Lab Results   Component Value Date    GLUCOSE 102 (H) 2022    CALCIUM 10.7 (H) 2022     2022    K 4.1 2022    CO2 23.4 2022     2022    BUN 20 2022    CREATININE 0.91 2022    EGFRIFAFRI 62 2022    EGFRIFNONA 54 (L) 2022    BCR 22.0 2022    ANIONGAP 13.6 2022     Lab Results   Component Value Date    WBC 5.29 2022    HGB 13.5 2022    HCT 39.9 2022    MCV 92.4 2022     2022     Lab Results   Component Value Date     (H) 2022     (H) 2022     (H) 2021            Review of labs: Urine culture with normal leeann    Review and interpretation of imaging: Reviewed head CT and subarachnoid component of the " superior aspect of the left parietal lobe    Workup to date:    Diagnoses:  1.  Altered mental  2.  Suspected dementia  3.  Psychosis    Impression: 82-year-old female with past medical history hypertension, hyperlipidemia, aortic valve stenosis who presented to the ER with daughter with confusion and visual hallucinations.  Apparently was independent and living at home.  Head CT in the ER shows subarachnoid component of the left frontal region.  She needs MRI for further evaluation.    Overnight she received low-dose Zyprexa.  Today she is Off the bed, pleasantly confused during my encounter, appears to be hallucinating, confabulation and confused speech.  Apparently she became more anxious yesterday afternoon    Plan:  1.  MRI brain.  Ordered Haldol to be given prior  2.  Delirium precautions  3.  Case management for placement options    We will be following along with you peripherally    I spent at least 30 minutes interviewing, examining, and counseling patient.  I independently reviewed documentation, laboratory and diagnostic findings, external documentation where applicable, and formulated treatment plan which was discussed with the patient.      Thank you for this consultation.  Discussed above plan with neuro attending, Dr. Prince who agrees with above plan.  Neurology team is available for concerns or questions.
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
## 2022-11-05 NOTE — NURSING NOTE
Pt became hypotensive/bradycardic suddenly when RN entered room to give meds. Multiple BP's obtained and pt's pressure remained 60's/30's w/ HR in low 50's. Pt was less responsive than she had been earlier in the shift. Rapid called.  200ml bolus given and EKG ordered. Pt's BP and HR slowly improved. MD notified and no other tests ordered. WCTM.

## 2022-11-06 NOTE — PLAN OF CARE
Goal Outcome Evaluation:     Heart rate 120-130 this afternoon and BP elevated, MD notified, Metoprolol administered,  VS improved.    Progress: no change      Problem: Restraint, Nonbehavioral (Nonviolent)  Goal: Absence of Harm or Injury  Outcome: Ongoing    Intervention: Implement Least Restrictive Safety Strategies  Description: Consider personal and environmental factors contributing to nonviolent or self-destructive behavior.  Utilize diversional activity/alternative device protection.  Document alternative strategies and less restrictive intervention attempts and their effects.  Clearly describe/record behavior leading to need for restraint.  Use the least restrictive method of restraint.  Recognize restraint should only be used if needed to improve the patient's wellbeing and less restrictive interventions have been determined to be ineffective.  Reassess continued need frequently. Remove restraint as soon as unsafe situation is resolved.  Recent Flowsheet Documentation  Taken 11/6/2022 1600 by Uyen Roman, RN  Medical Device Protection: torso covered  Less Restrictive Alternative:  • bed alarm in use  • environment adjusted  • calming techniques promoted  De-Escalation Techniques:  • increased round frequency  • stimulation decreased  • reoriented  Diversional Activities: television  Taken 11/6/2022 1500 by Uyen Romna, RN  Medical Device Protection: torso covered  Less Restrictive Alternative:  • bed alarm in use  • environment adjusted  • calming techniques promoted  De-Escalation Techniques:  • increased round frequency  • stimulation decreased  • appropriate behavior reinforced  Diversional Activities: television  Taken 11/6/2022 1400 by Uyen Roman, RN  Medical Device Protection: torso covered  Less Restrictive Alternative:  • bed alarm in use  • environment adjusted  • calming techniques promoted  De-Escalation Techniques:  • increased round frequency  • diversional activity encouraged  •  reoriented  Diversional Activities: television  Taken 11/6/2022 1200 by Uyen Roman RN  Medical Device Protection:  • torso covered  • tubing secured  Less Restrictive Alternative:  • bed alarm in use  • environment adjusted  • calming techniques promoted  De-Escalation Techniques:  • reoriented  • verbally redirected  • increased round frequency  Diversional Activities: television  Taken 11/6/2022 1000 by Uyen Roman, RN  Medical Device Protection: torso covered  Less Restrictive Alternative:  • bed alarm in use  • environment adjusted  • calming techniques promoted  De-Escalation Techniques:  • stimulation decreased  • increased round frequency  • reoriented  Diversional Activities: television  Taken 11/6/2022 0813 by Uyen Roman, RN  Medical Device Protection:  • torso covered  • IV pole/bag removed from visual field  Taken 11/6/2022 0800 by Uyen Roman, RN  Medical Device Protection:  • torso covered  • IV pole/bag removed from visual field  Less Restrictive Alternative:  • bed alarm in use  • appropriate expression promoted  • environment adjusted  De-Escalation Techniques:  • increased round frequency  • reoriented  • verbally redirected  Diversional Activities: television  Intervention: Protect Skin and Joint Integrity  Description: Frequently check restraint application site and document findings.  Release and replace at regular intervals per facility protocol.  Assist with frequent joint range of motion activity.  Recent Flowsheet Documentation  Taken 11/6/2022 0813 by Uyen Roman, RN  Body Position:  • position changed independently  • weight shifting

## 2022-11-06 NOTE — SIGNIFICANT NOTE
11/06/22 1606   OTHER   Discipline physical therapist   Rehab Time/Intention   Session Not Performed other (see comments)  (RN hold as pts HR in 130's. RN waiting to speak w/ MD. Noted pt had rapid called Sat for hypotensive/bradycardic episode. PT will f/u w/ pt next service date for eval as medically appropriate.)   Recommendation   PT - Next Appointment 11/07/22

## 2022-11-06 NOTE — PROGRESS NOTES
"DOS: 2022  NAME: Cesilia Lutz   : 1940  PCP: Quinn Cabrera MD  Chief Complaint   Patient presents with   • Altered Mental Status       Chief complaint: AMS, SAH  Subjective: Patient lying in bed.  She is smiling.  She appears comfortable in no distress.  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  She does notice ring tone of watch.  She had an episode of hypotension yesterday but was fluid responsive.  Blood pressure stable now    Objective:  Vital signs: /72 (BP Location: Left arm, Patient Position: Lying)   Pulse 93   Temp 98.5 °F (36.9 °C) (Oral)   Resp 18   Ht 149.9 cm (59\")   Wt 51.7 kg (113 lb 15.7 oz)   SpO2 96%   BMI 23.02 kg/m²        Gen: NAD, vitals reviewed  MS: Oriented to self only, poor attention, names months of year forwards but backwards gets to November and then Thanksgiving back to November and December.  Follows some commands but difficulty with directions  CN: visual acuity grossly normal with blink to threat, PERRL, EOMI, no facial droop, intermittent dysarthria  Motor: She is in restraints but moving symmetrically, wiggles toes to command  Sensory: intact to light touch all 4 ext.    ROS:  No weakness, numbness  No fevers, chills      Laboratory results:  Lab Results   Component Value Date    GLUCOSE 107 (H) 2022    CALCIUM 9.6 2022     2022    K 3.5 2022    CO2 19.0 (L) 2022     2022    BUN 29 (H) 2022    CREATININE 0.96 2022    EGFRIFAFRI 62 2022    EGFRIFNONA 54 (L) 2022    BCR 30.2 (H) 2022    ANIONGAP 16.0 (H) 2022     Lab Results   Component Value Date    WBC 9.85 2022    HGB 13.6 2022    HCT 40.0 2022    MCV 91.5 2022     2022     Lab Results   Component Value Date     (H) 2022     (H) 2022     (H) 2021            Review of labs: B12 303, vitamin D 18, " cortisol 22, TSH 1.7, glucose 107, creatinine 1.96, WBCs 9000, hemoglobin 13, platelets 294    Review and interpretation of imaging:   Reviewed head CT and there is hyperintensity within the sulci of left parietal lobe    Workup to date:    Diagnoses:  Diagnoses:  1.  AMS  2.  Suspected dementia  3.  Psychosis     Impression: 82-year-old female with past medical history hypertension, hyperlipidemia, aortic valve stenosis who presented to the ER with daughter with confusion and visual hallucinations.  Apparently was independent and living at home but suspicion for dementia hx. Head CT in the ER shows subarachnoid component of the left frontal region. She needs MRI for further evaluation.     She continues with active hallucinations, awake most of the night.  She is in restraints.  Yesterday she had episode of hypotension with associated bradycardia but fluid responsive.  Of note she had not received haldol and I believe she will need low dose for MRI     Plan:  1.  MRI brain.  Ordered Haldol to be given prior  2.  Delirium precautions  3.  Case management for placement options     We will be following along with you peripherally     I spent at least 30 minutes interviewing, examining, and counseling patient.  I independently reviewed documentation, laboratory and diagnostic findings, external documentation where applicable, and formulated treatment plan which was discussed with the patient.      Thank you for this consultation.  Discussed above plan with neuro attending, Dr. Prince who agrees with above plan.  Neurology team is available for concerns or questions.      .

## 2022-11-06 NOTE — PLAN OF CARE
Goal Outcome Evaluation:      VSS. Patient oriented to self only. She has hallucinations and was awake most of the night talking to someone in the room. She has burst of irritation and uncooperative most of the night. Patient only voided 150ml throughout the night. Bladder scan showed 513. Straight cath performed obtaining 500ml of clear, yellow urine. Patient tolerated well.  High falls precaution in place. Restraints remains in place for safety.            Problem: Adult Inpatient Plan of Care  Goal: Plan of Care Review  Outcome: Ongoing, Progressing  Goal: Patient-Specific Goal (Individualized)  Outcome: Ongoing, Progressing  Goal: Absence of Hospital-Acquired Illness or Injury  Outcome: Ongoing, Progressing  Intervention: Identify and Manage Fall Risk  Recent Flowsheet Documentation  Taken 11/6/2022 0400 by Annie Murcia RN  Safety Promotion/Fall Prevention: safety round/check completed  Taken 11/6/2022 0200 by Annie Murcia RN  Safety Promotion/Fall Prevention: safety round/check completed  Taken 11/6/2022 0000 by Annie Murcia RN  Safety Promotion/Fall Prevention: safety round/check completed  Taken 11/5/2022 2200 by Annie Murcia RN  Safety Promotion/Fall Prevention: safety round/check completed  Taken 11/5/2022 2000 by Annie Murcia RN  Safety Promotion/Fall Prevention:  • activity supervised  • assistive device/personal items within reach  • fall prevention program maintained  • nonskid shoes/slippers when out of bed  • safety round/check completed  Intervention: Prevent Skin Injury  Recent Flowsheet Documentation  Taken 11/6/2022 0400 by Annie Murcia RN  Body Position: weight shifting  Taken 11/6/2022 0200 by Annie Murcia RN  Body Position: position changed independently  Taken 11/6/2022 0000 by Annie Murcia RN  Body Position:  • weight shifting  • position changed independently  Taken 11/5/2022 2200 by Annie Murcia RN  Body Position: position changed independently  Taken  11/5/2022 2000 by Annie Murcia RN  Body Position: sitting up in bed  Intervention: Prevent and Manage VTE (Venous Thromboembolism) Risk  Recent Flowsheet Documentation  Taken 11/5/2022 2000 by Annie Murcia RN  Activity Management: activity adjusted per tolerance  Goal: Optimal Comfort and Wellbeing  Outcome: Ongoing, Progressing  Intervention: Provide Person-Centered Care  Recent Flowsheet Documentation  Taken 11/5/2022 2200 by Annie Murcia RN  Trust Relationship/Rapport: care explained  Taken 11/5/2022 2000 by Annie Murcia RN  Trust Relationship/Rapport:  • care explained  • thoughts/feelings acknowledged  • reassurance provided  Goal: Readiness for Transition of Care  Outcome: Ongoing, Progressing     Problem: Fall Injury Risk  Goal: Absence of Fall and Fall-Related Injury  Outcome: Ongoing, Progressing  Intervention: Identify and Manage Contributors  Recent Flowsheet Documentation  Taken 11/5/2022 2000 by Annie Murcia RN  Medication Review/Management: medications reviewed  Intervention: Promote Injury-Free Environment  Recent Flowsheet Documentation  Taken 11/6/2022 0400 by Annie Murcia RN  Safety Promotion/Fall Prevention: safety round/check completed  Taken 11/6/2022 0200 by Annie Murcia RN  Safety Promotion/Fall Prevention: safety round/check completed  Taken 11/6/2022 0000 by Annie Murcia RN  Safety Promotion/Fall Prevention: safety round/check completed  Taken 11/5/2022 2200 by Annie Murcia RN  Safety Promotion/Fall Prevention: safety round/check completed  Taken 11/5/2022 2000 by Annie Murcia RN  Safety Promotion/Fall Prevention:  • activity supervised  • assistive device/personal items within reach  • fall prevention program maintained  • nonskid shoes/slippers when out of bed  • safety round/check completed     Problem: Hypertension Comorbidity  Goal: Blood Pressure in Desired Range  Outcome: Ongoing, Progressing  Intervention: Maintain Blood Pressure  Management  Recent Flowsheet Documentation  Taken 11/5/2022 2000 by Annie Murcia RN  Medication Review/Management: medications reviewed     Problem: Skin Injury Risk Increased  Goal: Skin Health and Integrity  Outcome: Ongoing, Progressing  Intervention: Optimize Skin Protection  Recent Flowsheet Documentation  Taken 11/6/2022 0400 by Annie Murcia RN  Head of Bed (HOB) Positioning: HOB at 20 degrees  Taken 11/6/2022 0200 by Annie Murcia RN  Head of Bed (HOB) Positioning: HOB at 20 degrees  Taken 11/6/2022 0000 by Annie Murcia RN  Head of Bed (HOB) Positioning: HOB at 20 degrees  Taken 11/5/2022 2200 by Annie Murcia RN  Head of Bed (HOB) Positioning: HOB at 20 degrees  Taken 11/5/2022 2000 by Annie Murcia RN  Pressure Reduction Techniques:  • frequent weight shift encouraged  • weight shift assistance provided  Head of Bed (HOB) Positioning: HOB at 20-30 degrees  Pressure Reduction Devices:  • alternating pressure pump (ADD)  • heel offloading device utilized  • positioning supports utilized     Problem: Restraint, Nonbehavioral (Nonviolent)  Goal: Absence of Harm or Injury  Outcome: Ongoing, Progressing  Intervention: Implement Least Restrictive Safety Strategies  Recent Flowsheet Documentation  Taken 11/6/2022 0400 by Annie Murcia RN  Medical Device Protection: tubing secured  Less Restrictive Alternative:  • appropriate expression promoted  • bed alarm in use  • calming techniques promoted  • sensory stimulation limited  De-Escalation Techniques:  • quiet time facilitated  • reoriented  • stimulation decreased  Diversional Activities: television  Taken 11/6/2022 0200 by Annie Murcia RN  Medical Device Protection: tubing secured  Less Restrictive Alternative:  • appropriate expression promoted  • bed alarm in use  • calming techniques promoted  • sensory stimulation limited  De-Escalation Techniques:  • quiet time facilitated  • reoriented  • stimulation decreased  • verbally  redirected  Diversional Activities: television  Taken 11/6/2022 0000 by Annie Murcia RN  Medical Device Protection: tubing secured  Less Restrictive Alternative:  • appropriate expression promoted  • bed alarm in use  • calming techniques promoted  • sensory stimulation limited  De-Escalation Techniques:  • quiet time facilitated  • stimulation decreased  Diversional Activities: television  Taken 11/5/2022 2200 by Annie Murcia RN  Medical Device Protection:  • IV pole/bag removed from visual field  • tubing secured  Less Restrictive Alternative:  • appropriate expression promoted  • bed alarm in use  • calming techniques promoted  • sensory stimulation limited  De-Escalation Techniques:  • quiet time facilitated  • reoriented  • stimulation decreased  • verbally redirected  Diversional Activities: television  Taken 11/5/2022 2000 by Annie Murcia RN  Medical Device Protection:  • IV pole/bag removed from visual field  • tubing secured  Less Restrictive Alternative:  • appropriate expression promoted  • bed alarm in use  • calming techniques promoted  • sensory stimulation limited  De-Escalation Techniques:  • quiet time facilitated  • reoriented  • stimulation decreased  • verbally redirected  Diversional Activities: television  Intervention: Protect Dignity, Rights, and Personal Wellbeing  Recent Flowsheet Documentation  Taken 11/5/2022 2200 by Annie Mrucia RN  Trust Relationship/Rapport: care explained  Taken 11/5/2022 2000 by Annie Murcia RN  Trust Relationship/Rapport:  • care explained  • thoughts/feelings acknowledged  • reassurance provided  Intervention: Protect Skin and Joint Integrity  Recent Flowsheet Documentation  Taken 11/6/2022 0400 by Annie Murcia RN  Body Position: weight shifting  Taken 11/6/2022 0200 by Annie Murcia RN  Body Position: position changed independently  Taken 11/6/2022 0000 by Annie Murcia RN  Body Position:  • weight shifting  • position changed  independently  Taken 11/5/2022 2200 by Annie Murcia, RN  Body Position: position changed independently  Taken 11/5/2022 2000 by Annie Murcia, RN  Body Position: sitting up in bed

## 2022-11-06 NOTE — PROGRESS NOTES
"DAILY PROGRESS NOTE  [facility]    Patient Identification:  Name: Cesilia Lutz  Age: 82 y.o.  Sex: female  :  1940  MRN: [id number]         Primary Care Physician: Quinn Cabrera MD      Subjective  Patient not communicating any complaints.  Exceedingly poor historian.    Objective:  General Appearance:  Comfortable, well-appearing, in no acute distress and not in pain.    Vital signs: (most recent): Blood pressure 148/72, pulse 93, temperature 98.5 °F (36.9 °C), temperature source Oral, resp. rate 18, height 149.9 cm (59\"), weight 51.7 kg (113 lb 15.7 oz), SpO2 96 %.    Lungs:  Normal effort and normal respiratory rate.  Breath sounds clear to auscultation.    Heart: Normal rate.  Regular rhythm.    Extremities: There is no dependent edema.    Neurological: (Patient is awake.  Lying catty corner in the bed with her feet hanging off the edge.  Vacant stare.  Moderate eye contact at best.  Not oriented.  However she may also be hard of hearing.  When asked what her name was her response was \"I do not know, I have not had the time\".).    Skin:  Warm and dry.                Vital signs in last 24 hours:  Temp:  [97.8 °F (36.6 °C)-98.5 °F (36.9 °C)] 98.5 °F (36.9 °C)  Heart Rate:  [50-93] 93  Resp:  [18] 18  BP: ()/(32-90) 148/72    Intake/Output:    Intake/Output Summary (Last 24 hours) at 2022 1000  Last data filed at 2022 0600  Gross per 24 hour   Intake 120 ml   Output 650 ml   Net -530 ml         Results from last 7 days   Lab Units 22  0724 22  1700   WBC 10*3/mm3 9.85 5.29   HEMOGLOBIN g/dL 13.6 13.5   PLATELETS 10*3/mm3 294 308     Results from last 7 days   Lab Units 22  0724 22  1700   SODIUM mmol/L 141 140   POTASSIUM mmol/L 3.5 4.1   CHLORIDE mmol/L 106 103   CO2 mmol/L 19.0* 23.4   BUN mg/dL 29* 20   CREATININE mg/dL 0.96 0.91   GLUCOSE mg/dL 107* 102*   Estimated Creatinine Clearance: 36.2 mL/min (by C-G formula based on SCr of 0.96 " mg/dL).  Results from last 7 days   Lab Units 11/06/22  0724 11/03/22  1700   CALCIUM mg/dL 9.6 10.7*   ALBUMIN g/dL 3.80 4.10   MAGNESIUM mg/dL 2.0 1.9     Results from last 7 days   Lab Units 11/06/22  0724 11/03/22  1700   ALBUMIN g/dL 3.80 4.10   BILIRUBIN mg/dL 0.7 1.2   ALK PHOS U/L 89 108   AST (SGOT) U/L 36* 23   ALT (SGPT) U/L 18 13       Assessment:    Acute UTI    Hypertension    Impaired glucose tolerance    Hyperlipidemia    Nonrheumatic aortic valve stenosis    Visual hallucination    Subarachnoid hemorrhage (HCC)    Metabolic encephalopathy    Cerebral amyloid angiopathy (HCC)    Moderate dementia with mood disturbance    Hypotension    83yo woman admitted with altered mental status and found to have left parietal subarachnoid hemorrhage. Following for Dr. Cabrera.     Appreciate Neuro attention to pt,  delirium precautions, requiring soft wrist restraints for pt and staff safety, MRI still pending.   Amyloid angiopathy?     Suspect previously undiagnosed dementia as playing large role here per Dr. Cabrera  Consider scheduled HS Zyprexa  Will likely require placement at dc   Will check EEG for potential seizure with postictal state.     Pyuria: No bacteria seen.  No fever no leukocytosis.  Doubt this is playing a role in her presentation.  Unable to assess for symptomatology.    Had hypotensive/bradycardic episode yesterday, symptomatic, rapid called, responded well to IVF bolus, her BB was held, have stopped now, continue telemetry monitoring for now         • SCDs for DVT prophylaxis.  • Full code.    All problems new to this examiner.    Plan:  Please see above.    Caden Sotelo MD  11/6/2022  10:00 EST    Addendum:  Informed by RN that patient's heart rate is now 120s to 130s.  Resume metoprolol.  Check stat EKG.  Electronically signed by Caden Sotelo MD, 11/06/22, 2:38 PM EST.

## 2022-11-07 NOTE — THERAPY EVALUATION
Patient Name: Cesilia Lutz  : 1940    MRN: [id number]                              Today's Date: 2022       Admit Date: 11/3/2022    Visit Dx:     ICD-10-CM ICD-9-CM   1. Visual hallucination  R44.1 368.16   2. Acute UTI  N39.0 599.0   3. 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   • Impaired glucose tolerance   • Hyperlipidemia   • Abdominal wall hernia   • Nonrheumatic aortic valve stenosis   • Acute UTI   • Visual hallucination   • Subarachnoid hemorrhage (HCC)   • Metabolic encephalopathy   • Cerebral amyloid angiopathy (HCC)   • Moderate dementia with mood disturbance   • Hypotension     Past Medical History:   Diagnosis Date   • Abdominal wall hernia 2014   • Adenoma of right adrenal gland 2014    2.4cm   • Hyperlipidemia    • Hypertension    • IGT (impaired glucose tolerance)    • PVC (premature ventricular contraction)      Past Surgical History:   Procedure Laterality Date   • ANKLE ARTHROPLASTY Right    • BREAST BIOPSY Left 2019    due to Calcifications- Normal   • CARPAL TUNNEL RELEASE     • CHOLECYSTECTOMY     • COLONOSCOPY N/A 2017    Procedure: COLONOSCOPY TO TRANSVERSE COLON WITH COLD SNARE POLYPECTOMY;  Surgeon: Carlos Payton MD;  Location: [facility] ENDOSCOPY;  Service:    • GANGLION CYST EXCISION Left     x 2   • INCISIONAL HERNIA REPAIR  04/15/2019    Dr Shyla Bartholomew      General Information     Row Name 22 1451          Physical Therapy Time and Intention    Document Type evaluation  -EJ     Mode of Treatment physical therapy  -EJ     Row Name 22 1451          General Information    Patient Profile Reviewed yes  -EJ     Prior Level of Function independent:;ADL's;all household mobility  lives alone, has cand and walker  -EJ     Existing Precautions/Restrictions fall  -EJ     Barriers to Rehab cognitive status   -[facility] Name 11/07/22 1451          Living Environment    People in Home alone  -[facility] Name 11/07/22 1451          Cognition    Orientation Status (Cognition) unable/difficult to assess  -[facility] Name 11/07/22 1451          Safety Issues, Functional Mobility    Impairments Affecting Function (Mobility) balance;cognition;endurance/activity tolerance;coordination;strength;range of motion (ROM);postural/trunk control  -           User Key  (r) = Recorded By, (t) = Taken By, (c) = Cosigned By    Initials Name Provider Type    Sanaz Cabezas, PT Physical Therapist               Mobility     Row Name 11/07/22 1453          Bed Mobility    Bed Mobility supine-sit;sit-supine  -     Supine-Sit [place] (Bed Mobility) dependent (less than 25% patient effort);2 person assist;maximum assist (25% patient effort)  -     Sit-Supine [place] (Bed Mobility) dependent (less than 25% patient effort);2 person assist  -     Assistive Device (Bed Mobility) head of bed elevated;bed rails  -     Comment, (Bed Mobility) attempted to sit EOB, however, pt w difficulty bending at waist  in order to sit upright - limited due to cognitive status at this time, pt assisted back to supine so that she would not slide out of bed.  -EJ     Row Name 11/07/22 1453          Transfers    Comment, (Transfers) not appropriate at this time.  -EJ     Row Name 11/07/22 1453          Bed-Chair Transfer    Bed-Chair [place] (Transfers) unable to assess  -EJ     Row Name 11/07/22 1453          Sit-Stand Transfer    Sit-Stand [place] (Transfers) unable to assess  -EJ     Row Name 11/07/22 1453          Gait/Stairs (Locomotion)    [place] Level (Gait) unable to assess  -           User Key  (r) = Recorded By, (t) = Taken By, (c) = Cosigned By    Initials Name Provider Type    Sanaz Cabezas, PT Physical Therapist               Obj/Interventions     [facility] Name 11/07/22 1455          Range of Motion  Comprehensive    Comment, General Range of Motion difficult to assess due to cognitive status  -EJ     Row Name 11/07/22 2997          Strength Comprehensive (MMT)    Comment, General Manual Muscle Testing (MMT) Assessment generalized weakness, difficult to assess  -EJ           User Key  (r) = Recorded By, (t) = Taken By, (c) = Cosigned By    Initials Name Provider Type    Sanaz Cabezas, PT Physical Therapist               Goals/Plan     Row Name 11/07/22 1500          Bed Mobility Goal 1 (PT)    Activity/Assistive Device (Bed Mobility Goal 1, PT) bed mobility activities, all  -EJ     [place] Level/Cues Needed (Bed Mobility Goal 1, PT) minimum assist (75% or more patient effort)  -EJ     Time Frame (Bed Mobility Goal 1, PT) 10 days  -EJ     Row Name 11/07/22 1500          Transfer Goal 1 (PT)    Activity/Assistive Device (Transfer Goal 1, PT) transfers, all;walker, rolling  -EJ     [place] Level/Cues Needed (Transfer Goal 1, PT) minimum assist (75% or more patient effort)  -EJ     Time Frame (Transfer Goal 1, PT) 10 days  -EJ     Row Name 11/07/22 1500          Gait Training Goal 1 (PT)    Activity/Assistive Device (Gait Training Goal 1, PT) gait (walking locomotion);walker, rolling  -EJ     [place] Level (Gait Training Goal 1, PT) minimum assist (75% or more patient effort)  -EJ     Distance (Gait Training Goal 1, PT) 20  -EJ     Time Frame (Gait Training Goal 1, PT) 10 days  -EJ     Row Name 11/07/22 1500          Therapy Assessment/Plan (PT)    Planned Therapy Interventions (PT) balance training;bed mobility training;gait training;home exercise program;patient/family education;stretching;strengthening;stair training;ROM (range of motion);transfer training  -EJ           User Key  (r) = Recorded By, (t) = Taken By, (c) = Cosigned By    Initials Name Provider Type    Sanaz Cabezas, PT Physical Therapist               Clinical Impression     Row Name 11/07/22 1457          Pain     Pretreatment Pain Rating 0/10 - no pain  -EJ     Row Name 11/07/22 1455          Plan of Care Review    Plan of Care Reviewed With patient  -EJ     Outcome Evaluation Attempted to see pt for PT eval this afternoon. SHe was admitted to [facility] on 11/3 w acute UTI and AMS. At baseline, pt is living alone and has a cane and walker. Her daughter reports some memory loss, but pt is normally independent w mobility and ADLs. Currently, pt is in restraits with increased confusion. Difficult to assess strength and mobility due to mental status. She is requiring max/dep A to attempt sitting on EOB. Pt having difficulty bending forward at waist to sit upright. Pt assisted back to supine in bed and restraints reapplied. She will most likely require SNU at ID. Will continue to follow for skilled PT to maxmize strength and mobility as pt is able to tolerate.  -     Row Name 11/07/22 1455          Therapy Assessment/Plan (PT)    Rehab Potential (PT) fair, will monitor progress closely  -EJ     Criteria for Skilled Interventions Met (PT) yes  -EJ     Therapy Frequency (PT) 3 times/wk  -EJ     Row Name 11/07/22 1455          Positioning and Restraints    Pre-Treatment Position in bed  -EJ     Post Treatment Position bed  -EJ     In Bed notified nsg;supine;call light within reach;encouraged to call for assist;exit alarm on  -EJ     Restraints reapplied:;soft limb  -EJ           User Key  (r) = Recorded By, (t) = Taken By, (c) = Cosigned By    Initials Name Provider Type    Sanaz Cabezas, PT Physical Therapist               Outcome Measures     Row Name 11/07/22 1500 11/07/22 1000       How much help from another person do you currently need...    Turning from your back to your side while in flat bed without using bedrails? 2  -EJ 3  -RB    Moving from lying on back to sitting on the side of a flat bed without bedrails? 1  -EJ 3  -RB    Moving to and from a bed to a chair (including a wheelchair)? 1  -EJ 3  -RB    Standing up  from a chair using your arms (e.g., wheelchair, bedside chair)? 1  -EJ 3  -RB    Climbing 3-5 steps with a railing? 1  -EJ 2  -RB    To walk in hospital room? 1  -EJ 2  -RB    AM-PAC 6 Clicks Score (PT) 7  -EJ 16  -RB    Highest level of mobility 2 --> Bed activities/dependent transfer  -EJ 5 --> Static standing  -RB    Row Name 11/07/22 0800          How much help from another person do you currently need...    Turning from your back to your side while in flat bed without using bedrails? 3  -RB     Moving from lying on back to sitting on the side of a flat bed without bedrails? 3  -RB     Moving to and from a bed to a chair (including a wheelchair)? 3  -RB     Standing up from a chair using your arms (e.g., wheelchair, bedside chair)? 3  -RB     Climbing 3-5 steps with a railing? 2  -RB     To walk in hospital room? 2  -RB     AM-PAC 6 Clicks Score (PT) 16  -RB     Highest level of mobility 5 --> Static standing  -RB           User Key  (r) = Recorded By, (t) = Taken By, (c) = Cosigned By    Initials Name Provider Type     Sanaz Maher, PT Physical Therapist    Jose Alfredo Wright RN Registered Nurse                             Physical Therapy Education     Title: PT OT SLP Therapies (In Progress)     Topic: Physical Therapy (In Progress)     Point: Mobility training (In Progress)     Learning Progress Summary           Patient Acceptance, E,TB,D, NR,NL by  at 11/7/2022 1501                   Point: Home exercise program (Not Started)     Learner Progress:  Not documented in this visit.          Point: Body mechanics (Not Started)     Learner Progress:  Not documented in this visit.          Point: Precautions (Not Started)     Learner Progress:  Not documented in this visit.                      User Key     Initials Effective Dates Name Provider Type [facility] 06/16/21 -  Sanaz Maher, PT Physical Therapist PT              PT Recommendation and Plan  Planned Therapy Interventions (PT):  balance training, bed mobility training, gait training, home exercise program, patient/family education, stretching, strengthening, stair training, ROM (range of motion), transfer training  Plan of Care Reviewed With: patient  Outcome Evaluation: Attempted to see pt for PT eval this afternoon. SHe was admitted to [facility] on 11/3 w acute UTI and AMS. At baseline, pt is living alone and has a cane and walker. Her daughter reports some memory loss, but pt is normally independent w mobility and ADLs. Currently, pt is in restraits with increased confusion. Difficult to assess strength and mobility due to mental status. She is requiring max/dep A to attempt sitting on EOB. Pt having difficulty bending forward at waist to sit upright. Pt assisted back to supine in bed and restraints reapplied. She will most likely require SNU at [place]. Will continue to follow for skilled PT to maxmize strength and mobility as pt is able to tolerate.     Time Calculation:    PT Charges     Row Name 11/07/22 1501             Time Calculation    Start Time 1355  -EJ      Stop Time 1415  -EJ      Time Calculation (min) 20 min  -EJ      PT Received On 11/07/22  -EJ      PT - Next Appointment 11/09/22  -EJ      PT Goal Re-Cert Due Date 11/17/22  -EJ         Time Calculation- PT    Total Timed Code Minutes- PT 15 minute(s)  -EJ            User Key  (r) = Recorded By, (t) = Taken By, (c) = Cosigned By    Initials Name Provider Type    EJ Sanaz Maher, PT Physical Therapist              Therapy Charges for Today     Code Description Service Date Service Provider Modifiers Qty    72987767739 HC PT EVAL MOD COMPLEXITY 3 11/7/2022 Sanaz Maher, PT GP 1    54412070062 HC PT THER PROC EA 15 MIN 11/7/2022 Sanaz Maher, PT GP 1    84768961520 HC PT THER SUPP EA 15 MIN 11/7/2022 Sanaz Maher, PT GP 1          PT G-Codes  AM-PAC 6 Clicks Score (PT): 7    Sanaz Maher PT  11/7/2022

## 2022-11-07 NOTE — PROGRESS NOTES
Continued Stay Note  [facility]     Patient Name: Cesilia Lutz  MRN: [id number]  Today's Date: 11/7/2022    Admit Date: 11/3/2022    Plan: SNF referrals pending   Discharge Plan     Row Name 11/07/22 1014       Plan    Plan SNF referrals pending    Patient/Family in Agreement with Plan yes    Plan Comments SNF referrals pending medical stability. Pt remains in restraints. CCP to continue to follow. Yani Marrufo LCSW               Discharge Codes    No documentation.               Expected Discharge Date and Time     Expected Discharge Date Expected Discharge Time    Nov 8, 2022             Renee Marrufo LCSW

## 2022-11-07 NOTE — PLAN OF CARE
Problem: Restraint, Nonbehavioral (Nonviolent)  Goal: Absence of Harm or Injury  Intervention: Implement Least Restrictive Safety Strategies  Recent Flowsheet Documentation  Taken 11/7/2022 0400 by Joyce Huggins RN  Medical Device Protection: other (see comments)  Less Restrictive Alternative:   bed alarm in use   environment adjusted   calming techniques promoted  De-Escalation Techniques:   reoriented   stimulation decreased  Diversional Activities: television  Taken 11/7/2022 0200 by Joyce Huggins RN  Medical Device Protection: other (see comments)  Less Restrictive Alternative:   bed alarm in use   environment adjusted   calming techniques promoted  De-Escalation Techniques:   reoriented   quiet time facilitated   increased round frequency  Diversional Activities: television  Taken 11/7/2022 0000 by Joyce Huggins RN  Medical Device Protection: other (see comments)  Less Restrictive Alternative:   bed alarm in use   environment adjusted   calming techniques promoted  De-Escalation Techniques:   1:1 observation initiated   quiet time facilitated   reoriented  Diversional Activities: television  Taken 11/6/2022 2200 by Joyce Huggins RN  Medical Device Protection: other (see comments)  Less Restrictive Alternative:   bed alarm in use   environment adjusted   calming techniques promoted  De-Escalation Techniques:   1:1 observation initiated   quiet time facilitated   reoriented  Diversional Activities: television  Taken 11/6/2022 2048 by Joyce Huggins RN  Diversional Activities: television  Taken 11/6/2022 2000 by Joyce Huggins RN  Medical Device Protection: other (see comments)  Less Restrictive Alternative:   bed alarm in use   environment adjusted   calming techniques promoted  De-Escalation Techniques:   reoriented   quiet time facilitated   increased round frequency  Diversional Activities: television  Intervention: Protect Dignity, Rights, and Personal Wellbeing  Recent  Flowsheet Documentation  Taken 11/6/2022 2048 by Joyce Huggins, RN  Trust Relationship/Rapport: care explained   Goal Outcome Evaluation:

## 2022-11-07 NOTE — TELEPHONE ENCOUNTER
Caller: KATTY    Relationship: Other    Best call back number: [phone]    What orders are you requesting (i.e. lab or imaging): VERBAL ORDERS    In what timeframe would the patient need to come in: ASAP    Where will you receive your lab/imaging services:  [facility]    Additional notes: KATTY MCCALL  WITH [facility] IS NEEDING VERBAL ORDERS FOR IN PATIENT ORDER.    PATIENT IS IN OBSERVATION.     PLEASE CALL TO ADVISE.

## 2022-11-07 NOTE — PROGRESS NOTES
DOS: 2022  NAME: Cesilia Lutz   : 1940  PCP: Quinn Cabrera MD    Chief Complaint   Patient presents with   • Altered Mental Status        Stroke    Subjective: Pt seen in follow up, however the problem is new to me.  Patient in bilateral wrist restraints.  EEG tech was in the room as I entered.  Patient was argumentative and very agitated.  No family at bedside.    Objective:  Vital signs:      Vitals:    22 0023 22 0412 22 0949   BP: 111/69 112/55 122/75 108/56   BP Location: Left arm Left arm Left arm Left arm   Patient Position: Lying Lying Lying Lying   Pulse: 86 78 92 87   Resp: 18 18 18 18   Temp: 99 °F (37.2 °C) 99.1 °F (37.3 °C) 98.8 °F (37.1 °C) 97.7 °F (36.5 °C)   TempSrc: Oral Oral Oral Oral   SpO2: 94% 95% 94% 95%   Weight:       Height:           Current Facility-Administered Medications:   •  acetaminophen (TYLENOL) tablet 650 mg, 650 mg, Oral, Q4H PRN, Quinn Cabrera MD  •  haloperidol lactate (HALDOL) injection 1 mg, 1 mg, Intravenous, Once PRN, Quinn Cabrera MD  •  melatonin tablet 5 mg, 5 mg, Oral, Nightly PRN, Quinn Cabrera MD, 5 mg at 22  •  metoprolol tartrate (LOPRESSOR) tablet 25 mg, 25 mg, Oral, Q12H, Caden Sotelo MD, 25 mg at 22  •  potassium chloride (K-DUR,KLOR-CON) ER tablet 20 mEq, 20 mEq, Oral, Daily, Quinn Cabrera MD, 20 mEq at 22  •  rosuvastatin (CRESTOR) tablet 20 mg, 20 mg, Oral, Nightly, Quinn Cabrera MD, 20 mg at 22  •  sodium chloride 0.9 % flush 10 mL, 10 mL, Intravenous, PRN, Davon Winston MD  •  sodium chloride 0.9 % flush 10 mL, 10 mL, Intravenous, Q12H, Quinn Cabrera MD, 10 mL at 22 0936  •  sodium chloride 0.9 % flush 10 mL, 10 mL, Intravenous, PRN, Quinn Cabrera MD  •  sodium chloride 0.9 % with KCl 20 mEq/L infusion, 50 mL/hr, Intravenous, Continuous, Quinn Cabrera MD, Last Rate: 50 mL/hr at 22 1245, 50 mL/hr at 22 1245  •  vitamin  B-12 (CYANOCOBALAMIN) tablet 100 mcg, 100 mcg, Oral, Daily, Quinn Cabrera MD, 100 mcg at 11/07/22 0936    PRN meds  •  acetaminophen  •  haloperidol lactate  •  melatonin  •  sodium chloride  •  sodium chloride    No current facility-administered medications on file prior to encounter.     Current Outpatient Medications on File Prior to Encounter   Medication Sig   • Cyanocobalamin (VITAMIN B 12 PO) Take 1 tablet by mouth Daily.   • estradiol 0.25 MG/0.25GM gel Place  on the skin as directed by provider Daily.   • medroxyPROGESTERone (PROVERA) 2.5 MG tablet Take 2.5 mg by mouth Daily.   • metoprolol succinate XL (TOPROL-XL) 50 MG 24 hr tablet Take 1 tablet by mouth once daily   • potassium chloride (K-DUR,KLOR-CON) 10 MEQ CR tablet Take 2 tablets by mouth once daily   • rosuvastatin (CRESTOR) 20 MG tablet Take 1 tablet by mouth once daily   • triamterene-hydrochlorothiazide (DYAZIDE) 37.5-25 MG per capsule Take 1 capsule by mouth once daily in the morning       General appearance: Elderly female, restless and agitated, NAD, alert, well groomed  HEENT: Normocephalic, atraumatic, no masses or tenderness  Resp: Even and unlabored  Extremities: No obvious edema  Skin: warm, dry    Neurological:   MS: oriented to self, stated she was in [place], [place] at [facility], recent/remote memory impaired, decreased attention/concentration, language intact, no obvious neglect  CN: +blink to threat reflex, tracked me to the left and right, no obvious facial droop, [place] bilaterally, difficult to assess most 2/2 mental status  Motor: 5/5 in all 4 ext., normal tone  Reflexes: 1+ in lower extremities  Sensory: light touch sensation intact in all 4 ext.  Coordination: ANGE  Gait and station: Deferred, patient in bilateral wrist restraints  Rapid alternating movements: normal finger to thumb tap    Laboratory results:  Lab Results   Component Value Date    TSH 1.760 11/06/2022     Lab Results   Component Value Date    HGBA1C 6.1  (H) 08/18/2022     Lab Results   Component Value Date    TUAZIHRC08 303 11/06/2022     Lab Results   Component Value Date    CHLPL 197 08/18/2022    CHLPL 198 02/18/2022    CHLPL 209 (H) 07/02/2021     Lab Results   Component Value Date    TRIG 86 08/18/2022    TRIG 151 (H) 02/18/2022    TRIG 185 (H) 07/02/2021     Lab Results   Component Value Date    HDL 53 08/18/2022    HDL 36 (L) 02/18/2022    HDL 38 (L) 07/02/2021     Lab Results   Component Value Date     (H) 08/18/2022     (H) 02/18/2022     (H) 07/02/2021     Lab Results   Component Value Date    WBC 9.37 11/07/2022    HGB 13.6 11/07/2022    HCT 41.5 11/07/2022    MCV 93.5 11/07/2022     11/07/2022     Lab Results   Component Value Date    GLUCOSE 124 (H) 11/07/2022    BUN 28 (H) 11/07/2022    CREATININE 0.96 11/07/2022    EGFRIFNONA 54 (L) 02/18/2022    EGFRIFAFRI 62 02/18/2022    BCR 29.2 (H) 11/07/2022    K 3.5 11/07/2022    CO2 20.6 (L) 11/07/2022    CALCIUM 9.7 11/07/2022    PROTENTOTREF 7.0 02/18/2022    ALBUMIN 3.90 11/07/2022    LABIL2 1.4 02/18/2022    AST 35 (H) 11/07/2022    ALT 19 11/07/2022     No results found for: PTT  No results found for: INR, PROTIME  Brief Urine Lab Results  (Last result in the past 365 days)      Color   Clarity   Blood   Leuk Est   Nitrite   Protein   CREAT   Urine HCG        11/03/22 1817 Yellow   Cloudy   Trace   Large (3+)   Negative   Trace                 Review and interpretation of imaging:  CT Head Without Contrast    Result Date: 11/3/2022   There is a small amount of peripheral subarachnoid hemorrhage within a sulcus of the superior aspect of the left parietal lobe.  These findings were discussed with Dr. Cifuentes on 11/03/2022 at approximately 7:50 PM.  Radiation dose reduction techniques were utilized, including automated exposure control and exposure modulation based on body size.  This report was finalized on 11/3/2022 8:39 PM by Dr. Martin Perez M.D.      XR Chest 1 View    Result  Date: 11/3/2022  No focal pulmonary consolidation. Follow-up as clinical indications persist.  This report was finalized on 11/3/2022 5:43 PM by Dr. Duke Campuzano M.D.      Results for orders placed during the hospital encounter of 10/14/22    Adult Transthoracic Echo Complete W/ Cont if Necessary Per Protocol    Interpretation Summary  •  Calculated left ventricular EF = 62% Estimated left ventricular EF was in agreement with the calculated left ventricular EF. Left ventricular systolic function is normal.  •  Left ventricular diastolic function was normal.  •  Calcified aortic valve with moderate aortic valve stenosis  present. Aortic valve area is 0.9 cm2.Peak velocity of the flow distal to the aortic valve is 285.3 cm/s. Aortic valve maximum pressure gradient is 33 mmHg. Aortic valve mean pressure gradient is 18 mmHg. Aortic valve dimensionless index is 0.3 .  •  Estimated right ventricular systolic pressure from tricuspid regurgitation is normal (<35 mmHg).      Impression/Assessment:  This is a 82 year old female with past medical history of HTN, HLD who presented to the hospital on 11/3/2022 with complaints of confusion and hallucinations. Daughter reported she has noticed some memory issues with the patient over the last few months and her primary physician noted she was evasive regarding direct questions.The day of admission she had told her daughter that she though someone was in her home hiding in the closet and that she thought she was seeing snakes but actually was mistaking this for an extension cord.    She underwent a noncontrast CT head on arrival that revealed a thin sulcal subarachnoid hemorrhage on the left side.  MRI has been ordered due to concern for underlying cerebral amyloid angiopathy and is pending.  EEG was also ordered.    She had a rapid response team called on 11/5 after she became less response and noted to be hypotensive and bradycardic.  Multiple BPs were taken with recordings  as low 60s/30s and HR in the 50s. She received an IVF bolus and slowly started to improve.    Diagnosis:  Subarachnoid hemorrhage with unknown etiology  Cognitive impairment with memory loss and delusions, suspect late onset with behavioral disturbance Alzheimer's dementia    Plan:  D/C EEG, low yield for now. Patient was very agitated  and combative during my exam today while EEG tech was in the room. She is currently in bilateral wrist restraints.  Attempted to call daughter today to see what the patient is like at baseline.  Typically this type of SAH does not occur in the setting of cerebral amyloid angiopathy but it is possible. Could be traumatic etiology.  She does appear to have advanced dementia and agree with Dr. Prince, recommend long-term care placement at discharge preferably at a memory care unit.  With recent bradycardia/hypotension would not initiate Aricept trial at this time, re-evaluate in outpatient setting.  Can reassess attempt of MRI tomorrow.  B12 low normal, will start B12 replacement with 1000mcg PO.  Will continue to follow.    Case discussed with patient, RN, attempted to call daughter by phone but no answer therefore I had to leave a voicemail. Discussed with Dr. Humphreys, and he agrees with plan above.     ARTIS Esparza

## 2022-11-07 NOTE — PROGRESS NOTES
"   LOS: 0 days    Patient Care Team:  Quinn Cabrera MD as PCP - General (Internal Medicine)  Quinn Cabrera MD as PCP - Internal Medicine (Internal Medicine)  Xiomara Díaz MD as Consulting Physician (Obstetrics and Gynecology)    Chief Complaint:    Chief Complaint   Patient presents with   • Altered Mental Status     Subjective     Interval History:     Patient Complaints: Delirium worsened over the weekend.  Increased agitation and confusion.  Placed in restraints.  Had a spell of low blood pressure and bradycardia for which a rapid response was placed on Saturday.  Patient cannot give a history today.  She does not recognize me.  Actively hallucinating.    Objective     Vital Signs  Temp:  [98.5 °F (36.9 °C)-99.4 °F (37.4 °C)] 98.8 °F (37.1 °C)  Heart Rate:  [] 92  Resp:  [18] 18  BP: (111-161)/(55-80) 122/75    Flowsheet Rows    Flowsheet Row First Filed Value   Admission Height 149.9 cm (59\") Documented at 11/03/2022 2121   Admission Weight 52 kg (114 lb 10.2 oz) Documented at 11/03/2022 2121          No intake/output data recorded.  I/O last 3 completed shifts:  In: 120 [P.O.:120]  Out: 800 [Urine:800]    Intake/Output Summary (Last 24 hours) at 11/7/2022 0910  Last data filed at 11/6/2022 1656  Gross per 24 hour   Intake --   Output 150 ml   Net -150 ml       Physical Exam:   General Appearance:    Alert, and awake but actively hallucinating, in no acute distress, mouth is dry, in restraints       Eyes:            Conjunctivae and sclerae normal, no   icterus, PERRLA   Neck:          Lungs:     Clear to auscultation,respirations regular, even and                  unlabored    Heart:    Regular rhythm and normal rate, normal S1 and S2, no            murmur, no gallop, no rub, no click       Abdomen:     Normal bowel sounds, no masses, no organomegaly, soft        non-tender, non-distended, no guarding, no rebound                tenderness       Extremities:    Pulses:        Lymph nodes:  "         Results Review:    I reviewed the patient's new clinical results.  Results from last 7 days   Lab Units 11/07/22  0531 11/06/22 0724 11/03/22  1700   SODIUM mmol/L 143 141 140   POTASSIUM mmol/L 3.5 3.5 4.1   CHLORIDE mmol/L 106 106 103   CO2 mmol/L 20.6* 19.0* 23.4   BUN mg/dL 28* 29* 20   CREATININE mg/dL 0.96 0.96 0.91   CALCIUM mg/dL 9.7 9.6 10.7*   BILIRUBIN mg/dL 1.2 0.7 1.2   ALK PHOS U/L 87 89 108   ALT (SGPT) U/L 19 18 13   AST (SGOT) U/L 35* 36* 23   GLUCOSE mg/dL 124* 107* 102*       Estimated Creatinine Clearance: 36.2 mL/min (by C-G formula based on SCr of 0.96 mg/dL).    Results from last 7 days   Lab Units 11/06/22  0724 11/03/22  1700   MAGNESIUM mg/dL 2.0 1.9             Results from last 7 days   Lab Units 11/07/22  0531 11/06/22 0724 11/03/22  1700   WBC 10*3/mm3 9.37 9.85 5.29   HEMOGLOBIN g/dL 13.6 13.6 13.5   PLATELETS 10*3/mm3 286 294 308               Imaging Results (Last 24 Hours)     ** No results found for the last 24 hours. **        metoprolol tartrate, 25 mg, Oral, Q12H  potassium chloride, 20 mEq, Oral, Daily  rosuvastatin, 20 mg, Oral, Nightly  sodium chloride, 10 mL, Intravenous, Q12H  vitamin B-12, 100 mcg, Oral, Daily           Medication Review:   Current Facility-Administered Medications   Medication Dose Route Frequency Provider Last Rate Last Admin   • acetaminophen (TYLENOL) tablet 650 mg  650 mg Oral Q4H PRN Quinn Cabrera MD       • haloperidol lactate (HALDOL) injection 1 mg  1 mg Intravenous Once PRN Quinn Cabrera MD       • melatonin tablet 5 mg  5 mg Oral Nightly PRN Quinn Cabrera MD   5 mg at 11/06/22 2048   • metoprolol tartrate (LOPRESSOR) tablet 25 mg  25 mg Oral Q12H Caden Sotelo MD   25 mg at 11/06/22 1504   • potassium chloride (K-DUR,KLOR-CON) ER tablet 20 mEq  20 mEq Oral Daily Quinn Cabrera MD   20 mEq at 11/06/22 0810   • rosuvastatin (CRESTOR) tablet 20 mg  20 mg Oral Nightly Quinn Cabrera MD   20 mg at 11/06/22 2048   • sodium  chloride 0.9 % flush 10 mL  10 mL Intravenous PRN Davon Winston MD       • sodium chloride 0.9 % flush 10 mL  10 mL Intravenous Q12H Quinn Cabrera MD   10 mL at 11/06/22 0810   • sodium chloride 0.9 % flush 10 mL  10 mL Intravenous PRN Quinn Cabrera MD       • vitamin B-12 (CYANOCOBALAMIN) tablet 100 mcg  100 mcg Oral Daily Quinn Cabrera MD   100 mcg at 11/06/22 0810       Assessment & Plan       Acute UTI    Hypertension    Impaired glucose tolerance    Hyperlipidemia    Nonrheumatic aortic valve stenosis    Visual hallucination    Subarachnoid hemorrhage (HCC)    Metabolic encephalopathy    Cerebral amyloid angiopathy (HCC)    Moderate dementia with mood disturbance    Hypotension    Dementia likely related to cerebral amyloid angiopathy.  Tiny subarachnoid hemorrhage related to this process.  Her delirium and hallucinations have worsened in the hospital.  MRI is still pending.  EEG has been performed.  She has scheduled Zyprexa at bedtime.  She is going to require placement at discharge.  Unable to place as long as she is in restraints.      PPE today included face mask and eye shield.    Quinn Cabrera MD  11/07/22  09:10 EST

## 2022-11-07 NOTE — PLAN OF CARE
Goal Outcome Evaluation:  Plan of Care Reviewed With: patient           Outcome Evaluation: Attempted to see pt for PT eval this afternoon. SHe was admitted to [facility] on 11/3 w acute UTI and AMS. At baseline, pt is living alone and has a cane and walker. Her daughter reports some memory loss, but pt is normally independent w mobility and ADLs. Currently, pt is in restraits with increased confusion. Difficult to assess strength and mobility due to mental status. She is requiring max/dep A to attempt sitting on EOB. Pt having difficulty bending forward at waist to sit upright. Pt assisted back to supine in bed and restraints reapplied. She will most likely require SNU at [place]. Will continue to follow for skilled PT to maxmize strength and mobility as pt is able to tolerate.

## 2022-11-08 NOTE — PLAN OF CARE
Problem: Adult Inpatient Plan of Care  Goal: Absence of Hospital-Acquired Illness or Injury  Intervention: Identify and Manage Fall Risk  Recent Flowsheet Documentation  Taken 11/8/2022 0405 by Tamera Ballard RN  Safety Promotion/Fall Prevention: safety round/check completed  Taken 11/8/2022 0240 by Tamera Ballard RN  Safety Promotion/Fall Prevention: safety round/check completed  Taken 11/8/2022 0015 by Tamera Ballard RN  Safety Promotion/Fall Prevention: safety round/check completed  Taken 11/7/2022 2230 by Tamera Ballard RN  Safety Promotion/Fall Prevention: safety round/check completed  Taken 11/7/2022 2020 by Tamera Ballard RN  Safety Promotion/Fall Prevention:   safety round/check completed   fall prevention program maintained     Problem: Restraint, Nonbehavioral (Nonviolent)  Goal: Absence of Harm or Injury  Outcome: Ongoing, Progressing  Intervention: Implement Least Restrictive Safety Strategies  Recent Flowsheet Documentation  Taken 11/8/2022 0415 by Tamera Ballard RN  Medical Device Protection:   IV pole/bag removed from visual field   tubing secured  Less Restrictive Alternative:   bed alarm in use   calming techniques promoted   environment adjusted   medication offered   sensory stimulation limited  De-Escalation Techniques:   diversional activity encouraged   reoriented   stimulation decreased  Diversional Activities: television  Taken 11/8/2022 0210 by Tamera Ballard RN  Medical Device Protection:   IV pole/bag removed from visual field   tubing secured  Less Restrictive Alternative:   bed alarm in use   calming techniques promoted   environment adjusted   medication offered   sensory stimulation limited  De-Escalation Techniques:   appropriate behavior reinforced   increased round frequency   reoriented   stimulation decreased  Diversional Activities: television  Taken 11/8/2022 0035 by Tamera Ballard RN  Medical Device Protection:   IV pole/bag removed from visual field    tubing secured  Less Restrictive Alternative:   bed alarm in use   calming techniques promoted   environment adjusted   medication offered   sensory stimulation limited  De-Escalation Techniques:   appropriate behavior reinforced   diversional activity encouraged   increased round frequency   reoriented   stimulation decreased  Diversional Activities: television  Taken 11/8/2022 0000 by Tamera Ballard RN  Medical Device Protection:   IV pole/bag removed from visual field   tubing secured  Less Restrictive Alternative:   bed alarm in use   calming techniques promoted   environment adjusted   medication offered   sensory stimulation limited  De-Escalation Techniques:   stimulation decreased   diversional activity encouraged   reoriented  Diversional Activities: television  Taken 11/7/2022 2215 by Tamera Ballard RN  Medical Device Protection:   IV pole/bag removed from visual field   tubing secured  Less Restrictive Alternative:   bed alarm in use   calming techniques promoted   environment adjusted   medication offered   sensory stimulation limited  De-Escalation Techniques:   appropriate behavior reinforced   increased round frequency   quiet time facilitated   reoriented   stimulation decreased  Diversional Activities: television  Taken 11/7/2022 2020 by Tamera Ballard RN  Medical Device Protection:   IV pole/bag removed from visual field   tubing secured  Diversional Activities: television  Taken 11/7/2022 2000 by Tamera Ballard RN  Medical Device Protection:   IV pole/bag removed from visual field   tubing secured  Less Restrictive Alternative:   bed alarm in use   calming techniques promoted   environment adjusted   medication offered   sensory stimulation limited  De-Escalation Techniques:   appropriate behavior reinforced   increased round frequency   medication offered   quiet time facilitated   reoriented   stimulation decreased   verbally redirected  Diversional Activities:  television  Intervention: Protect Dignity, Rights, and Personal Wellbeing  Recent Flowsheet Documentation  Taken 11/7/2022 2020 by Tamera Ballard RN  Trust Relationship/Rapport:   care explained   choices provided  Intervention: Protect Skin and Joint Integrity  Recent Flowsheet Documentation  Taken 11/7/2022 2020 by Tamera Ballard RN  Body Position:   position changed independently   legs elevated   supine   weight shifting  Range of Motion:   active ROM (range of motion) encouraged   ROM (range of motion) performed     Problem: Restraint, Nonbehavioral (Nonviolent)  Goal: Absence of Harm or Injury  Intervention: Implement Least Restrictive Safety Strategies  Recent Flowsheet Documentation  Taken 11/8/2022 0415 by Tamera Ballard RN  Medical Device Protection:   IV pole/bag removed from visual field   tubing secured  Less Restrictive Alternative:   bed alarm in use   calming techniques promoted   environment adjusted   medication offered   sensory stimulation limited  De-Escalation Techniques:   diversional activity encouraged   reoriented   stimulation decreased  Diversional Activities: television  Taken 11/8/2022 0210 by Tamera Ballard RN  Medical Device Protection:   IV pole/bag removed from visual field   tubing secured  Less Restrictive Alternative:   bed alarm in use   calming techniques promoted   environment adjusted   medication offered   sensory stimulation limited  De-Escalation Techniques:   appropriate behavior reinforced   increased round frequency   reoriented   stimulation decreased  Diversional Activities: television  Taken 11/8/2022 0035 by Tamera Ballard RN  Medical Device Protection:   IV pole/bag removed from visual field   tubing secured  Less Restrictive Alternative:   bed alarm in use   calming techniques promoted   environment adjusted   medication offered   sensory stimulation limited  De-Escalation Techniques:   appropriate behavior reinforced   diversional activity  encouraged   increased round frequency   reoriented   stimulation decreased  Diversional Activities: television  Taken 11/8/2022 0000 by Tamera Ballard RN  Medical Device Protection:   IV pole/bag removed from visual field   tubing secured  Less Restrictive Alternative:   bed alarm in use   calming techniques promoted   environment adjusted   medication offered   sensory stimulation limited  De-Escalation Techniques:   stimulation decreased   diversional activity encouraged   reoriented  Diversional Activities: television  Taken 11/7/2022 2215 by Tamera Ballard RN  Medical Device Protection:   IV pole/bag removed from visual field   tubing secured  Less Restrictive Alternative:   bed alarm in use   calming techniques promoted   environment adjusted   medication offered   sensory stimulation limited  De-Escalation Techniques:   appropriate behavior reinforced   increased round frequency   quiet time facilitated   reoriented   stimulation decreased  Diversional Activities: television  Taken 11/7/2022 2020 by Tamera Ballard RN  Medical Device Protection:   IV pole/bag removed from visual field   tubing secured  Diversional Activities: television  Taken 11/7/2022 2000 by Tamera Ballard RN  Medical Device Protection:   IV pole/bag removed from visual field   tubing secured  Less Restrictive Alternative:   bed alarm in use   calming techniques promoted   environment adjusted   medication offered   sensory stimulation limited  De-Escalation Techniques:   appropriate behavior reinforced   increased round frequency   medication offered   quiet time facilitated   reoriented   stimulation decreased   verbally redirected  Diversional Activities: television     Problem: Restraint, Nonbehavioral (Nonviolent)  Goal: Absence of Harm or Injury  Intervention: Protect Dignity, Rights, and Personal Wellbeing  Recent Flowsheet Documentation  Taken 11/7/2022 2020 by Tamera Ballard RN  Trust Relationship/Rapport:   care  explained   choices provided     Problem: Restraint, Nonbehavioral (Nonviolent)  Goal: Absence of Harm or Injury  Intervention: Protect Skin and Joint Integrity  Recent Flowsheet Documentation  Taken 11/7/2022 2020 by Tamera Ballard RN  Body Position:   position changed independently   legs elevated   supine   weight shifting  Range of Motion:   active ROM (range of motion) encouraged   ROM (range of motion) performed   Goal Outcome Evaluation:

## 2022-11-08 NOTE — PROGRESS NOTES
"   LOS: 1 day    Patient Care Team:  Quinn Cabrera MD as PCP - General (Internal Medicine)  Quinn Cabrera MD as PCP - Internal Medicine (Internal Medicine)  Xiomara Díaz MD as Consulting Physician (Obstetrics and Gynecology)    Chief Complaint:    Chief Complaint   Patient presents with   • Altered Mental Status     Subjective     Interval History:     Patient Complaints: Delirium worsened over the weekend.  Increased agitation and confusion.  Placed in restraints.  Had a spell of low blood pressure and bradycardia for which a rapid response was placed on Saturday.  She is calmer today although quite confused.  Not hallucinating at present.    Objective     Vital Signs  Temp:  [97.7 °F (36.5 °C)-99.3 °F (37.4 °C)] 98.2 °F (36.8 °C)  Heart Rate:  [63-92] 63  Resp:  [16-18] 16  BP: (107-145)/(60-83) 122/83    Flowsheet Rows    Flowsheet Row First Filed Value   Admission Height 149.9 cm (59\") Documented at 11/03/2022 2121   Admission Weight 52 kg (114 lb 10.2 oz) Documented at 11/03/2022 2121          No intake/output data recorded.  I/O last 3 completed shifts:  In: -   Out: 200 [Urine:200]    Intake/Output Summary (Last 24 hours) at 11/8/2022 1320  Last data filed at 11/7/2022 1653  Gross per 24 hour   Intake --   Output 200 ml   Net -200 ml       Physical Exam:   General Appearance:    Alert, and awake but actively hallucinating, in no acute distress, mouth is less dry, in restraints       Eyes:            Conjunctivae and sclerae normal, no   icterus, PERRLA   Neck:          Lungs:     Clear to auscultation,respirations regular, even and                  unlabored    Heart:    Regular rhythm and normal rate, normal S1 and S2, no            murmur, no gallop, no rub, no click       Abdomen:     Normal bowel sounds, no masses, no organomegaly, soft        non-tender, non-distended, no guarding, no rebound                tenderness       Extremities:    Pulses:        Lymph nodes:          Results Review:  "   I reviewed the patient's new clinical results.  Results from last 7 days   Lab Units 11/07/22  0531 11/06/22 0724 11/03/22  1700   SODIUM mmol/L 143 141 140   POTASSIUM mmol/L 3.5 3.5 4.1   CHLORIDE mmol/L 106 106 103   CO2 mmol/L 20.6* 19.0* 23.4   BUN mg/dL 28* 29* 20   CREATININE mg/dL 0.96 0.96 0.91   CALCIUM mg/dL 9.7 9.6 10.7*   BILIRUBIN mg/dL 1.2 0.7 1.2   ALK PHOS U/L 87 89 108   ALT (SGPT) U/L 19 18 13   AST (SGOT) U/L 35* 36* 23   GLUCOSE mg/dL 124* 107* 102*       Estimated Creatinine Clearance: 36.2 mL/min (by C-G formula based on SCr of 0.96 mg/dL).    Results from last 7 days   Lab Units 11/06/22 0724 11/03/22  1700   MAGNESIUM mg/dL 2.0 1.9             Results from last 7 days   Lab Units 11/07/22  0531 11/06/22 0724 11/03/22  1700   WBC 10*3/mm3 9.37 9.85 5.29   HEMOGLOBIN g/dL 13.6 13.6 13.5   PLATELETS 10*3/mm3 286 294 308               Imaging Results (Last 24 Hours)     Procedure Component Value Units Date/Time    MRI Brain Without Contrast [[id number]] Collected: 11/08/22 1115     Updated: 11/08/22 1115    Narrative:      MRI EXAMINATION OF THE BRAIN WITHOUT CONTRAST     HISTORY: Subarachnoid hemorrhage, follow-up.     COMPARISON: CT head 11/03/2022.     TECHNIQUE: An MRI examination of the brain was performed utilizing  sagittal T1, axial diffusion, T1, T2, T2 FLAIR and susceptibility  weighted imaging.     FINDINGS: The diffusion sequence demonstrates a 2-3 mm area of increased  signal intensity involving the left frontal lobe anterior laterally.  Cortical area of increased signal intensity measuring 2 mm in size is  appreciated involving the right occipital lobe posterior laterally.     Gyriform areas of increased signal intensity are noted involving the  left parietal lobe superior laterally corresponding to the area of  subarachnoid hemorrhage noted on the CT examination of 11/03/2022. The  area of increased signal intensity measures approximately 2 cm in  maximum transverse dimension.  On the axial T2 FLAIR sequence areas of  increased signal intensity are appreciated involving the cortex of the  left parietal lobe as well as involving the sulci. The signal loss  involving the left parietal lobe is appreciated on the susceptibility  weighted sequence corresponding to the area of subarachnoid hemorrhage.  This measures approximately 2.3 cm in maximum transverse dimension.     Multiple areas of increased signal intensity are appreciated involving  the white matter of the cerebral hemispheres bilaterally on the axial T2  FLAIR sequence consistent with moderate small vessel ischemic disease.       Impression:      There is signal loss on the susceptibility weighted sequence  involving the left parietal lobe corresponding to the area of  subarachnoid hemorrhage with the area of signal loss measuring  approximately 2.3 cm in maximum transverse dimension. Adjacent areas of  diffusion hyperintensity are appreciated involving the left parietal  lobe some of which is likely artifactual secondary to the adjacent  subarachnoid hemorrhage, the majority of which represents small cortical  acute infarcts. There is also a punctate acute infarct involving the  left frontal lobe anterior laterally and the right occipital lobe  posterior laterally. Infarcts in multiple vascular distributions would  raise the possibility of a proximal or cardiac source. Moderate small  vessel ischemic disease is noted. [CHECK CHECK] Intensity                 metoprolol tartrate, 25 mg, Oral, Q12H  potassium chloride, 20 mEq, Oral, Daily  rosuvastatin, 20 mg, Oral, Nightly  sodium chloride, 10 mL, Intravenous, Q12H  vitamin B-12, 1,000 mcg, Oral, Daily      sodium chloride 0.9 % with KCl 20 mEq, 50 mL/hr, Last Rate: 50 mL/hr (11/08/22 1153)        Medication Review:   Current Facility-Administered Medications   Medication Dose Route Frequency Provider Last Rate Last Admin   • acetaminophen (TYLENOL) tablet 650 mg  650 mg Oral Q4H PRN  Quinn Cabrera MD       • melatonin tablet 5 mg  5 mg Oral Nightly PRN Quinn Cabrera MD   5 mg at 11/06/22 2048   • metoprolol tartrate (LOPRESSOR) tablet 25 mg  25 mg Oral Q12H Caden Sotelo MD   25 mg at 11/08/22 0932   • potassium chloride (K-DUR,KLOR-CON) ER tablet 20 mEq  20 mEq Oral Daily Quinn Cabrera MD   20 mEq at 11/08/22 0932   • rosuvastatin (CRESTOR) tablet 20 mg  20 mg Oral Nightly Quinn Cabrera MD   20 mg at 11/06/22 2048   • sodium chloride 0.9 % flush 10 mL  10 mL Intravenous PRN Davon Winston MD       • sodium chloride 0.9 % flush 10 mL  10 mL Intravenous Q12H Quinn Cabrera MD   10 mL at 11/08/22 0949   • sodium chloride 0.9 % flush 10 mL  10 mL Intravenous PRN Quinn Cabrera MD       • sodium chloride 0.9 % with KCl 20 mEq/L infusion  50 mL/hr Intravenous Continuous Quinn Cabrera MD 50 mL/hr at 11/08/22 1153 50 mL/hr at 11/08/22 1153   • vitamin B-12 (CYANOCOBALAMIN) tablet 1,000 mcg  1,000 mcg Oral Daily Ching Abdul APRN   1,000 mcg at 11/08/22 0933       Assessment & Plan       Acute UTI    Hypertension    Impaired glucose tolerance    Hyperlipidemia    Nonrheumatic aortic valve stenosis    Visual hallucination    Subarachnoid hemorrhage (HCC)    Metabolic encephalopathy    Cerebral amyloid angiopathy (HCC)    Moderate dementia with mood disturbance    Hypotension    Dementia likely related to cerebral amyloid angiopathy.  Tiny subarachnoid hemorrhage related to this process.  Her delirium and hallucinations have improved in the past 24 hours. EEG is pending.  MRI of brain that demonstrated several small cortical infarcts in different circulation beds raising the possibility of embolic source.  A TTE demonstrated mild aortic stenosis with a peak gradient of 33 mm and a mean gradient of 18 mm along with a EDUARDO of 0.9 cm.  No arrhythmias noted on the monitor.  Long-term anticoagulation would be problematic given the diagnosis of cerebral amyloid angiopathy as well as  the current subarachnoid hemorrhage.  She has scheduled Zyprexa at bedtime.  She is going to require placement at discharge.  Unable to place as long as she is in restraints.  Discussed with daughter and in no CODE STATUS will be placed which is appropriate.  Discussed the need for placement, preferably a memory care unit.      PPE today included face mask and eye shield.    Quinn Cabrera MD  11/08/22  13:20 EST

## 2022-11-08 NOTE — PROGRESS NOTES
DOS: 2022  NAME: Cesilia Lutz   : 1940  PCP: Quinn Cabrera MD    Chief Complaint   Patient presents with   • Altered Mental Status        Stroke    Subjective: No acute events overnight.  Patient still in bilateral wrist restraints today but nursing reports she is more pleasant today.  No family at bedside.     Objective:  Vital signs:      Vitals:    22 0100 22 0342 22 0801 22 1205   BP: 107/65 130/75 135/81 122/83   BP Location: Right arm Right arm Right arm Right arm   Patient Position: Lying Lying Lying Lying   Pulse: 82 92 91 63   Resp: 16 16 16 16   Temp:   99.3 °F (37.4 °C) 98.2 °F (36.8 °C)   TempSrc:   Oral Oral   SpO2: 96% 95% 94% 98%   Weight:       Height:           Current Facility-Administered Medications:   •  acetaminophen (TYLENOL) tablet 650 mg, 650 mg, Oral, Q4H PRN, Quinn Cabrera MD  •  melatonin tablet 5 mg, 5 mg, Oral, Nightly PRN, Quinn Cabrera MD, 5 mg at 22  •  metoprolol tartrate (LOPRESSOR) tablet 25 mg, 25 mg, Oral, Q12H, Caden Sotelo MD, 25 mg at 22  •  potassium chloride (K-DUR,KLOR-CON) ER tablet 20 mEq, 20 mEq, Oral, Daily, Quinn Cabrera MD, 20 mEq at 22  •  rosuvastatin (CRESTOR) tablet 20 mg, 20 mg, Oral, Nightly, Quinn Cabrera MD, 20 mg at 22  •  sodium chloride 0.9 % flush 10 mL, 10 mL, Intravenous, PRN, Davon Winston MD  •  sodium chloride 0.9 % flush 10 mL, 10 mL, Intravenous, Q12H, Quinn Cabrera MD, 10 mL at 22  •  sodium chloride 0.9 % flush 10 mL, 10 mL, Intravenous, PRN, Quinn Cabrera MD  •  sodium chloride 0.9 % with KCl 20 mEq/L infusion, 50 mL/hr, Intravenous, Continuous, Quinn Cabrera MD, Last Rate: 50 mL/hr at 22 1153, 50 mL/hr at 22 1153  •  vitamin B-12 (CYANOCOBALAMIN) tablet 1,000 mcg, 1,000 mcg, Oral, Daily, Ching Abdul, ARTIS, 1,000 mcg at 22 0933    PRN meds  •  acetaminophen  •  melatonin  •  sodium chloride  •  sodium  chloride    No current facility-administered medications on file prior to encounter.     Current Outpatient Medications on File Prior to Encounter   Medication Sig   • Cyanocobalamin (VITAMIN B 12 PO) Take 1 tablet by mouth Daily.   • estradiol 0.25 MG/0.25GM gel Place  on the skin as directed by provider Daily.   • medroxyPROGESTERone (PROVERA) 2.5 MG tablet Take 2.5 mg by mouth Daily.   • metoprolol succinate XL (TOPROL-XL) 50 MG 24 hr tablet Take 1 tablet by mouth once daily   • potassium chloride (K-DUR,KLOR-CON) 10 MEQ CR tablet Take 2 tablets by mouth once daily   • rosuvastatin (CRESTOR) 20 MG tablet Take 1 tablet by mouth once daily   • triamterene-hydrochlorothiazide (DYAZIDE) 37.5-25 MG per capsule Take 1 capsule by mouth once daily in the morning       General appearance: Elderly female, semicooperative, pleasantly confused, NAD, alert, well groomed  HEENT: Normocephalic, atraumatic, no masses or tenderness  Resp: Even and unlabored  Extremities: No obvious edema  Skin: warm, dry     Neurological:   MS: oriented to self only, recent/remote memory impaired, decreased attention/concentration, language intact but confabulating at times, no obvious neglect  CN: +blink to threat reflex, tracked me to the left and right, no obvious facial droop, [place] bilaterally, difficult to assess most 2/2 confusion  Motor: 5/5 in all 4 ext., normal tone  Reflexes: 1+ in lower and upper extremities  Sensory: light touch sensation intact in all 4 ext.    Physical exam performed, changes noted    Laboratory results:  Lab Results   Component Value Date    TSH 1.760 11/06/2022     Lab Results   Component Value Date    HGBA1C 6.1 (H) 08/18/2022     Lab Results   Component Value Date    KNGDGCGS78 303 11/06/2022     Lab Results   Component Value Date    CHLPL 197 08/18/2022    CHLPL 198 02/18/2022    CHLPL 209 (H) 07/02/2021     Lab Results   Component Value Date    TRIG 86 08/18/2022    TRIG 151 (H) 02/18/2022    TRIG 185 (H)  07/02/2021     Lab Results   Component Value Date    HDL 53 08/18/2022    HDL 36 (L) 02/18/2022    HDL 38 (L) 07/02/2021     Lab Results   Component Value Date     (H) 08/18/2022     (H) 02/18/2022     (H) 07/02/2021     Lab Results   Component Value Date    WBC 9.37 11/07/2022    HGB 13.6 11/07/2022    HCT 41.5 11/07/2022    MCV 93.5 11/07/2022     11/07/2022     Lab Results   Component Value Date    GLUCOSE 124 (H) 11/07/2022    BUN 28 (H) 11/07/2022    CREATININE 0.96 11/07/2022    EGFRIFNONA 54 (L) 02/18/2022    EGFRIFAFRI 62 02/18/2022    BCR 29.2 (H) 11/07/2022    K 3.5 11/07/2022    CO2 20.6 (L) 11/07/2022    CALCIUM 9.7 11/07/2022    PROTENTOTREF 7.0 02/18/2022    ALBUMIN 3.90 11/07/2022    LABIL2 1.4 02/18/2022    AST 35 (H) 11/07/2022    ALT 19 11/07/2022     No results found for: PTT  No results found for: INR, PROTIME  Brief Urine Lab Results  (Last result in the past 365 days)      Color   Clarity   Blood   Leuk Est   Nitrite   Protein   CREAT   Urine HCG        11/03/22 1817 Yellow   Cloudy   Trace   Large (3+)   Negative   Trace                 Review and interpretation of imaging:  CT Head Without Contrast    Result Date: 11/3/2022   There is a small amount of peripheral subarachnoid hemorrhage within a sulcus of the superior aspect of the left parietal lobe.  These findings were discussed with Dr. Cifuentes on 11/03/2022 at approximately 7:50 PM.  Radiation dose reduction techniques were utilized, including automated exposure control and exposure modulation based on body size.  This report was finalized on 11/3/2022 8:39 PM by Dr. Martin Perez M.D.      MRI Brain Without Contrast    Result Date: 11/8/2022  There is signal loss on the susceptibility weighted sequence involving the left parietal lobe corresponding to the area of subarachnoid hemorrhage with the area of signal loss measuring approximately 2.3 cm in maximum transverse dimension. Adjacent areas of diffusion  hyperintensity are appreciated involving the left parietal lobe some of which is likely artifactual secondary to the adjacent subarachnoid hemorrhage, the majority of which represents small cortical acute infarcts. There is also a punctate acute infarct involving the left frontal lobe anterior laterally and the right occipital lobe posterior laterally. Infarcts in multiple vascular distributions would raise the possibility of a proximal or cardiac source. Moderate small vessel ischemic disease is noted. [CHECK CHECK] Intensity        XR Chest 1 View    Result Date: 11/3/2022  No focal pulmonary consolidation. Follow-up as clinical indications persist.  This report was finalized on 11/3/2022 5:43 PM by Dr. Duke Campuzano M.D.      Results for orders placed during the hospital encounter of 10/14/22    Adult Transthoracic Echo Complete W/ Cont if Necessary Per Protocol    Interpretation Summary  •  Calculated left ventricular EF = 62% Estimated left ventricular EF was in agreement with the calculated left ventricular EF. Left ventricular systolic function is normal.  •  Left ventricular diastolic function was normal.  •  Calcified aortic valve with moderate aortic valve stenosis  present. Aortic valve area is 0.9 cm2.Peak velocity of the flow distal to the aortic valve is 285.3 cm/s. Aortic valve maximum pressure gradient is 33 mmHg. Aortic valve mean pressure gradient is 18 mmHg. Aortic valve dimensionless index is 0.3 .  •  Estimated right ventricular systolic pressure from tricuspid regurgitation is normal (<35 mmHg).        Impression/Assessment:  This is a 82 year old female with past medical history of HTN, HLD who presented to the hospital on 11/3/2022 with complaints of confusion and hallucinations. Daughter reported she has noticed some memory issues with the patient over the last few months and her primary physician noted she was evasive regarding direct questions.The day of admission she had told her  daughter that she though someone was in her home hiding in the closet and that she thought she was seeing snakes but actually was mistaking this for an extension cord.     She underwent a noncontrast CT head on arrival that revealed a thin sulcal subarachnoid hemorrhage on the left side.  MRI brain ordered due to concern for underlying cerebral amyloid angiopathy.  EEG was also ordered however we d/c'd on 11/7 due to patient being agitated and combative.      She had a rapid response team called on 11/5 after she became less response and noted to be hypotensive and bradycardic.  Multiple BPs were taken with recordings as low 60s/30s and HR in the 50s. She received an IVF bolus and slowly started to improve.     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  Appears to show acute strokes within different vascular territories. No evidence of cerebral amyloid angiopathy. Likely SAH was traumatic.   EKGs since admission have shown sinus rhythm with RBBB and LAFB.  She has no documented history of A. fib or a flutter.  Recommend zio patch at discharge given strokes appear embolic.  She had a 2D echo in October that revealed an EF of 62%, all LV wall segments sven normally, normal LA size and volume, moderate AV stenosis, trace MVR.  Will check carotid duplex.   Continue Crestor 20mg.  Hold off on adding ASA.  We will check A1c and lipid panel  Psych consult to manage behavioral disturbance.  Neurochecks per stroke protocol  Normalize BP  Stroke Education  HEMA/SCDs  PT/OT/ST  Therapies as written. CCP for discharge planning. Call RRT for any acute neurological changes. We will continue to follow and advise.    Case discussed with patient, RN, and   Juliann, and he agrees with plan above.    ARTIS Esparza

## 2022-11-08 NOTE — PAYOR COMM NOTE
"[facility]       [address] [place] [address]        Oh Kruse - [phone]  Utilization Review/Room Reservations  Phone: Jinao-[phone], [phone],or [phone]  Fax: [phone]  Email: [email or web address]  Please call, fax back, or email with authorization or any questions! Thanks!    REQUESTED CLINICAL   REF#[id number]  OBSERVATION CONVERTED TO INPATIENT ADMIT      This fax contains any of the following:  Face Sheet, H&P, progress notes, consults, orders, meds, lab results, labor record, vitals, delivery worksheet, op note, d/c summary.  The information contained in this fax is confidential for the use of the Individual or entity named above. If the reader of this message is not the Intended recipient (or the employee or agent responsible to deliver it to the Intended recipient), you are hereby notified that any dissemination, distribution, or copy of this communication is prohibited. If you have received this communication in error, please notify us by collect telephone call and return the original message to us at the above address at our expense.Cesilia Lutz (82 y.o. Female)     Date of Birth   1940    Social Security Number       Address   [address] [address]    Home Phone   [phone]    MRN   [id number]       Congregational   Worship    Marital Status                               Admission Date   11/3/22    Admission Type   Emergency    Admitting Provider   Quinn Cabrera MD    Attending Provider   Quinn Cabrera MD    Department, Room/Bed   [address], P592/1       Discharge Date       Discharge Disposition       Discharge Destination                               Attending Provider: Quinn Cabrera MD    Allergies: Fluvirin [Influenza Virus Vaccine Split]    Isolation: None   Infection: None   Code Status: CPR    Ht: 149.9 cm (59\")   Wt: 51.7 kg (113 lb 15.7 oz)    " Admission Cmt: None   Principal Problem: Acute UTI [N39.0]                 Active Insurance as of 11/3/2022     Primary Coverage     Payor Plan Insurance Group Employer/Plan Group    ANTHEM MEDICARE REPLACEMENT ANTHEM MEDICARE ADVANTAGE [id number]     Payor Plan Address Payor Plan Phone Number Payor Plan Fax Number Effective Dates    [address] [phone]  1/1/2017 - None Entered    [facility] [address]       Subscriber Name Subscriber Birth Date Member ID       ROSSY LUTZ 1940 [id number]                 Emergency Contacts      (Rel.) Home Phone Work Phone Mobile Phone    Sindhu Lutz (Daughter) [phone] -- --    FRANKLIN ANDRADE (Daughter) -- -- [phone]               History & Physical      Quinn Cabrera MD at 11/04/22 0755              Patient Care Team:  Quinn Cabrera MD as PCP - General (Internal Medicine)  Quinn Cabrera MD as PCP - Internal Medicine (Internal Medicine)  Xiomara Díaz MD as Consulting Physician (Obstetrics and Gynecology)    Chief complaint   Chief Complaint   Patient presents with   • Altered Mental Status        Subjective      Patient is a 82 y.o. female presents with confusion and visual hallucinations.  She told her daughter on the phone that she thought someone was in the house and also mistook an extension cord for a snake.  She has had notable memory problems over the past few months.  This is also been notable at her last few office visits where she is always pleasant and conversive but somewhat evasive regarding direct questions.  The question of a UTI was noted in the emergency room but she denies any urinary symptoms.  Unfortunately that was a clean-catch specimen making its reliability suspect.  A CT scan of the brain in the emergency room suggested amyloid angiopathy which would be a good reason for her memory disturbance.  There is mention of a tiny sliver hemorrhage near the sulcus of the superior aspect of the left parietal lobe that  raise concern for a tiny subarachnoid hemorrhage.  She was given a single dose of Rocephin in the emergency room.    Review of Systems  History of hypertension.  Hyperlipidemia.  Impaired glucose tolerance.  Mild aortic stenosis.  No cardiac complaints.  No GI complaints.  No  complaints.    All other pertinent items are noted in HPI, all other systems reviewed and negative    History    Current Facility-Administered Medications:   •  acetaminophen (TYLENOL) tablet 650 mg, 650 mg, Oral, Q4H PRN, Quinn Cabrera MD  •  hydroCHLOROthiazide (HYDRODIURIL) tablet 12.5 mg, 12.5 mg, Oral, Daily, Quinn Cabrera MD  •  melatonin tablet 5 mg, 5 mg, Oral, Nightly PRN, Quinn Cabrera MD  •  metoprolol succinate XL (TOPROL-XL) 24 hr tablet 50 mg, 50 mg, Oral, Q24H, Quinn Cabrera MD  •  potassium chloride (K-DUR,KLOR-CON) ER tablet 20 mEq, 20 mEq, Oral, Daily, Quinn Cabrera MD  •  rosuvastatin (CRESTOR) tablet 20 mg, 20 mg, Oral, Nightly, Quinn Cabrera MD  •  sodium chloride 0.9 % flush 10 mL, 10 mL, Intravenous, PRN, Davon Winston MD  •  sodium chloride 0.9 % flush 10 mL, 10 mL, Intravenous, Q12H, Quinn Cabrera MD, 10 mL at 11/03/22 2215  •  sodium chloride 0.9 % flush 10 mL, 10 mL, Intravenous, PRN, Quinn Cabrera MD  •  vitamin B-12 (CYANOCOBALAMIN) tablet 100 mcg, 100 mcg, Oral, Daily, Quinn Cabrera MD  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   • Hyperlipidemia    • Hypertension    • IGT (impaired glucose tolerance)    • PVC (premature ventricular contraction)      Past Surgical History:   Procedure Laterality Date   • ANKLE ARTHROPLASTY Right    • BREAST BIOPSY Left 01/2019    due to Calcifications- Normal   • CARPAL TUNNEL RELEASE     • CHOLECYSTECTOMY     • COLONOSCOPY N/A 2/21/2017    Procedure: COLONOSCOPY TO TRANSVERSE COLON WITH COLD SNARE POLYPECTOMY;  Surgeon: Carlos Payton MD;  Location: [facility] ENDOSCOPY;   Service:    • GANGLION CYST EXCISION Left     x 2   • INCISIONAL HERNIA REPAIR  04/15/2019    Dr Shyla Bartholomew     Family History   Problem Relation Age of Onset   • COPD Brother      Social History     Tobacco Use   • Smoking status: Never   • Smokeless tobacco: Never   Substance Use Topics   • Alcohol use: No   • Drug use: No     Medications Prior to Admission   Medication Sig Dispense Refill Last Dose   • Cyanocobalamin (VITAMIN B 12 PO) Take 1 tablet by mouth Daily.      • estradiol 0.25 MG/0.25GM gel Place  on the skin as directed by provider Daily.      • medroxyPROGESTERone (PROVERA) 2.5 MG tablet Take 2.5 mg by mouth Daily.      • metoprolol succinate XL (TOPROL-XL) 50 MG 24 hr tablet Take 1 tablet by mouth once daily 90 tablet 0    • potassium chloride (K-DUR,KLOR-CON) 10 MEQ CR tablet Take 2 tablets by mouth once daily 180 tablet 0    • rosuvastatin (CRESTOR) 20 MG tablet Take 1 tablet by mouth once daily 90 tablet 1    • triamterene-hydrochlorothiazide (DYAZIDE) 37.5-25 MG per capsule Take 1 capsule by mouth once daily in the morning 90 capsule 0      Allergies:  Fluvirin [influenza virus vaccine split]    Objective      Vital Signs  Temp:  [98.3 °F (36.8 °C)-99 °F (37.2 °C)] 98.3 °F (36.8 °C)  Heart Rate:  [] 76  Resp:  [18] 18  BP: (126-151)/(46-89) 137/73    Physical Exam:      General Appearance:    Alert, pleasant, cooperative, in no acute distress   Head:    Normocephalic, without obvious abnormality, atraumatic   Eyes:            Lids and lashes normal, conjunctivae and sclerae normal, no   icterus, no pallor, corneas clear, PERRLA   Ears:     Throat:    Neck:   No adenopathy, supple, trachea midline, no thyromegaly, no     carotid bruit, no JVD   Back:        Lungs:     Clear to auscultation,respirations regular, even and                   unlabored    Heart:    Regular rhythm and normal rate, normal S1 and S2, no            murmur, no gallop, no rub, no click   Breast Exam:    Deferred    Abdomen:     Normal bowel sounds, no masses, no organomegaly, soft        non-tender, non-distended, no guarding, no rebound                 tenderness   Genitalia:    Deferred   Extremities:   Moves all extremities well, no edema, no cyanosis, no              redness   Pulses:   Pulses palpable and equal bilaterally   Skin:   No bleeding, bruising or rash   Lymph nodes:   No palpable adenopathy   Neurologic:   Cranial nerves 2 - 12 grossly intact, cerebellar finger-to-nose intact, motor strength symmetric and normal, gets the year but not the month and date and gets only Biden with serial presidents questioning         Results Review:    I reviewed the patient's new clinical results.  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  This was disconcerting to the daughter since her dementia has previously been unrecognized.  The tiny amount of bleeding from near the superior sulcus would be consistent with her underlying amyloid angiopathy.  In this regard we will need to be sure to avoid anticoagulants in the future given the increased risks of cranial hemorrhage.  Its not clear if she has a UTI since this was a clean-catch specimen and even if she does its not at all clear that this is contributing to her sudden confusion.  We will hold off on antibiotics until we get the culture results.  We will get neurology consult regarding the bleeding and whether it is of any clinical significance or needs any attention.    I discussed the patients findings and my recommendations with patient.     Quinn Cabrera MD  11/04/22  07:56 EDT    Dragon disclaimer:   Much of this  encounter note is an electronic transcription/translation of spoken language to printed text. The electronic translation of spoken language may permit erroneous, or at times, nonsensical words or phrases to be inadvertently transcribed; Although I have reviewed the note for such errors, some may still exist.       Electronically signed by Quinn Cabrera MD at 22 0916       Orders (all)      Start     Ordered    22 0900  vitamin B-12 (CYANOCOBALAMIN) tablet 1,000 mcg  Daily         22 1731    22 2000  Restraints Non-Violent or Non-Self Destructive  Calendar Day         22 0236    22 1830  vitamin B-12 (CYANOCOBALAMIN) tablet 500 mcg  Daily,   Status:  Discontinued         22 1731    22 1557  Inpatient Admission  Once         22 1557    22 1503  PT Plan of Care Cert / Re-Cert  Once        Comments: Physical Therapy Plan of Care  Initial Certification  Certification Period: 2022 - 2023    Patient Name: Cesilia Lutz  : 1940    (R44.1) Visual hallucination  (primary encounter diagnosis)  (N39.0) Acute UTI  (R93.0) Abnormal head CT                  Assessment  PT Assessment  Rehab Potential (PT): fair, will monitor progress closely  Criteria for Skilled Interventions Met (PT): yes         PT Rehab Goals     Row Name 22 1500             Bed Mobility Goal 1 (PT)    Activity/Assistive Device (Bed Mobility Goal 1, PT) bed mobility   activities, all  -EJ      [place]ley Level/Cues Needed (Bed Mobility Goal 1, PT) minimum assist   (75% or more patient effort)  -EJ      Time Frame (Bed Mobility Goal 1, PT) 10 days  -EJ         Transfer Goal 1 (PT)    Activity/Assistive Device (Transfer Goal 1, PT) transfers, all;walker,   rolling  -EJ      [place] Level/Cues Needed (Transfer Goal 1, PT) minimum assist (75%   or more patient effort)  -EJ      Time Frame (Transfer Goal 1, PT) 10 days  -EJ         Gait Training Goal 1 (PT)     Activity/Assistive Device (Gait Training Goal 1, PT) gait (walking   locomotion);walker, rolling  -EJ      Smyth Level (Gait Training Goal 1, PT) minimum assist (75% or more   patient effort)  -EJ      Distance (Gait Training Goal 1, PT) 20  -EJ      Time Frame (Gait Training Goal 1, PT) 10 days  -EJ            User Key  (r) = Recorded By, (t) = Taken By, (c) = Cosigned By    Initials Name Provider Type Discipline    Sanaz Cabezas, PT Physical Therapist PT             PT OP Goals     Row Name 11/07/22 1501          Time Calculation    PT Goal Re-Cert Due Date 11/17/22  -EJ           User Key  (r) = Recorded By, (t) = Taken By, (c) = Cosigned By    Initials Name Provider Type    Sanaz Cabezas, PT Physical Therapist                  Plan  PT Plan  Therapy Frequency (PT): 3 times/wk  Planned Therapy Interventions (PT): balance training, bed mobility training, gait training, home exercise program, patient/family education, stretching, strengthening, stair training, ROM (range of motion), transfer training  Outcome Evaluation: Attempted to see pt for PT eval this afternoon. SHe was admitted to [facility] on 11/3 w acute UTI and AMS. At baseline, pt is living alone and has a cane and walker. Her daughter reports some memory loss, but pt is normally independent w mobility and ADLs. Currently, pt is in restraits with increased confusion. Difficult to assess strength and mobility due to mental status. She is requiring max/dep A to attempt sitting on EOB. Pt having difficulty bending forward at waist to sit upright. Pt assisted back to supine in bed and restraints reapplied. She will most likely require SNU at [place]. Will continue to follow for skilled PT to maxmize strength and mobility as pt is able to tolerate.        Sanaz Maher, PT  11/7/2022            By cosigning this order, either electronically or physically, I certify that the therapy services are furnished while this patient is under my care,  the services outline above are required by this patient, and will be reviewed every 90 days.        M.D.:__________________________________________ Date: ______________    11/07/22 1502    11/07/22 1015  sodium chloride 0.9 % with KCl 20 mEq/L infusion  Continuous         11/07/22 0916    11/07/22 0600  Comprehensive Metabolic Panel  Morning Draw         11/06/22 1007    11/07/22 0600  CBC & Differential  Morning Draw         11/06/22 1007    11/07/22 0600  CBC Auto Differential  PROCEDURE ONCE         11/06/22 2204 11/06/22 2100  metoprolol tartrate (LOPRESSOR) tablet 25 mg  Every 12 Hours Scheduled,   Status:  Discontinued         11/06/22 1436    11/06/22 1545  metoprolol tartrate (LOPRESSOR) tablet 25 mg  Every 12 Hours Scheduled         11/06/22 1454    11/06/22 1500  Restraints Non-Violent or Non-Self Destructive  Calendar Day         11/06/22 1517    11/06/22 1437  ECG 12 Lead Tachycardia  STAT         11/06/22 1437    11/06/22 1007  EEG  Once,   Status:  Canceled         11/06/22 1006    11/06/22 1007  CK  STAT         11/06/22 1006    11/06/22 0642  MRI Brain Without Contrast  1 Time Imaging         11/06/22 0641    11/06/22 0600  Comprehensive Metabolic Panel  Morning Draw         11/05/22 1944    11/06/22 0600  CBC & Differential  Morning Draw         11/05/22 1944 11/06/22 0600  Magnesium  Morning Draw         11/05/22 1944 11/06/22 0600  TSH  Morning Draw         11/05/22 1944 11/06/22 0600  Vitamin B12  Morning Draw         11/05/22 1944 11/06/22 0600  Vitamin D,25-Hydroxy  Morning Draw         11/05/22 1944 11/06/22 0600  Cortisol  Morning Draw         11/05/22 1945 11/06/22 0600  CBC Auto Differential  PROCEDURE ONCE         11/05/22 2205 11/05/22 1937  Place Sequential Compression Device  Once         11/05/22 1936 11/05/22 1937  Maintain Sequential Compression Device  Continuous         11/05/22 1936 11/05/22 1836  Restraints Non-Violent or Non-Self Destructive   Calendar Day,   Status:  Canceled         11/05/22 1836    11/05/22 1835  Restraints Non-Violent or Non-Self Destructive  Calendar Day,   Status:  Canceled         11/05/22 1835    11/05/22 1320  ECG 12 Lead Bradycardia  STAT         11/05/22 1319    11/05/22 1310  POC Glucose Once  PROCEDURE ONCE         11/05/22 1309    11/05/22 1222  haloperidol lactate (HALDOL) injection 1 mg  Once As Needed         11/05/22 1223    11/05/22 1145  haloperidol lactate (HALDOL) injection 1 mg  Once,   Status:  Discontinued        Note to Pharmacy: Preprodedural for MRI    11/05/22 1049    11/05/22 0200  OLANZapine (zyPREXA) injection 2.5 mg  Once         11/05/22 0114    11/05/22 0005  POC Glucose Once  PROCEDURE ONCE         11/05/22 0002    11/04/22 2100  rosuvastatin (CRESTOR) tablet 20 mg  Nightly         11/04/22 0735    11/04/22 1930  cefTRIAXone (ROCEPHIN) 1 g in sodium chloride 0.9 % 100 mL IVPB-VTB  Every 24 Hours         11/04/22 1259    11/04/22 1909  PT Consult: Eval & Treat Functional Mobility Below Baseline  Once         11/04/22 1908    11/04/22 1855  Restraints Non-Violent or Non-Self Destructive  Calendar Day,   Status:  Canceled         11/04/22 1936    11/04/22 0952  MRI Brain With & Without Contrast  1 Time Imaging,   Status:  Canceled         11/04/22 0953    11/04/22 0900  metoprolol succinate XL (TOPROL-XL) 24 hr tablet 50 mg  Every 24 Hours Scheduled,   Status:  Discontinued         11/04/22 0735    11/04/22 0900  vitamin B-12 (CYANOCOBALAMIN) tablet 100 mcg  Daily,   Status:  Discontinued         11/04/22 0735    11/04/22 0900  hydroCHLOROthiazide (HYDRODIURIL) tablet 12.5 mg  Daily,   Status:  Discontinued         11/04/22 0736    11/04/22 0900  potassium chloride (K-DUR,KLOR-CON) ER tablet 20 mEq  Daily         11/04/22 0736    11/04/22 0800  Oral Care  2 Times Daily       11/03/22 2041 11/04/22 0000  Vital Signs  Every 4 Hours      Comments: Per per hospital policy    11/03/22 2041 11/03/22 2100   sodium chloride 0.9 % flush 10 mL  Every 12 Hours Scheduled         11/03/22 2041 11/03/22 2056  Urine Culture - Urine, Urine, Clean Catch  Once        Comments: Please ensure 'Use Existing Specimen' is selected if this order is for urine culture add-on.  If no button appears, please contact the lab for assistance.      11/03/22 2055 11/03/22 2042  Daily Weights  Daily       11/03/22 2041 11/03/22 2042  VTE Prophylaxis Not Indicated: Age 70 or Greater (1), Obesity-BMI >30 (1); </= 3 (Low Risk)  Once         11/03/22 2041 11/03/22 2039  acetaminophen (TYLENOL) tablet 650 mg  Every 4 Hours PRN         11/03/22 2041 11/03/22 2039  melatonin tablet 5 mg  Nightly PRN         11/03/22 2041 11/03/22 2039  Activity - Ad Lauren  Until Discontinued         11/03/22 2041 11/03/22 2038  Diet Regular; Cardiac, Consistent Carbohydrate  Diet Effective Now         11/03/22 2041 11/03/22 2038  Oxygen Therapy- Nasal Cannula; Titrate for SPO2: 90% - 95%  Continuous         11/03/22 2041 11/03/22 2038  Inpatient consult to Case Management   Once        Provider:  (Not yet assigned)    11/03/22 2041 11/03/22 2038  Insert Peripheral IV  Once         11/03/22 2041 11/03/22 2038  Saline Lock & Maintain IV Access  Continuous         11/03/22 2041 11/03/22 2038  Code Status and Medical Interventions:  Continuous         11/03/22 2041 11/03/22 2037  sodium chloride 0.9 % flush 10 mL  As Needed         11/03/22 2041 11/03/22 2009  Inpatient Neurology Consult General  Once        Specialty:  Neurology  Provider:  Haris Hidalgo MD    11/03/22 2009 11/03/22 2002  Initiate Observation Status  Once         11/03/22 2003 11/03/22 1946  Diet Regular  Diet Effective Now,   Status:  Canceled         11/03/22 1945 11/03/22 1946  Initiate Observation Status  Once         11/03/22 1945 11/03/22 1933  Family Medicine Consult  Once        Specialty:  Family Medicine  Provider:  Quinn Cabrera  MD LETY    11/03/22 1932    11/03/22 1856  cefTRIAXone (ROCEPHIN) 1 g in sodium chloride 0.9 % 100 mL IVPB-VTB  Once         11/03/22 1854    11/03/22 1854  Urinalysis With Culture If Indicated - Urine, Clean Catch  Once,   Status:  Canceled        Comments: Please ensure 'Use Existing Specimen' is selected if this order is for urine culture add-on.  If no button appears, please contact the lab for assistance.      11/03/22 1854 11/03/22 1826  Urinalysis, Microscopic Only - Urine, Clean Catch  Once         11/03/22 1825    11/03/22 1804  CT Head Without Contrast  1 Time Imaging         11/03/22 1803    11/03/22 1634  NPO Diet NPO Type: Strict NPO  Diet Effective Now,   Status:  Canceled         11/03/22 1633    11/03/22 1634  Undress & Gown  Once         11/03/22 1633    11/03/22 1634  Cardiac Monitoring  Continuous        Comments: Follow Standing Orders As Outlined in Process Instructions (Open Order Report to View Full Instructions)    11/03/22 1633    11/03/22 1634  Continuous Pulse Oximetry  Continuous         11/03/22 1633    11/03/22 1634  Vital Signs  Per Hospital Policy         11/03/22 1633    11/03/22 1634  Orthostatic Blood Pressure  Once         11/03/22 1633    11/03/22 1634  Fall Precautions  Continuous         11/03/22 1633    11/03/22 1634  XR Chest 1 View  1 Time Imaging         11/03/22 1633    11/03/22 1634  ECG 12 Lead ED Triage Standing Order; Weak / Dizzy / AMS  Once         11/03/22 1633    11/03/22 1634  POC Glucose Once  Once         11/03/22 1633    11/03/22 1634  Insert Peripheral IV  Once         11/03/22 1633    11/03/22 1634  [place] Draw  Once         11/03/22 1633    11/03/22 1634  CBC & Differential  Once         11/03/22 1633    11/03/22 1634  Comprehensive Metabolic Panel  Once         11/03/22 1633    11/03/22 1634  Troponin  Once         11/03/22 1633    11/03/22 1634  Magnesium  Once         11/03/22 1633    11/03/22 1634  Urinalysis With Microscopic If Indicated (No Culture)  - Urine, Clean Catch  Once         22 1633    22 1634  Green Top (Gel)  PROCEDURE ONCE         22 1633    22 1634  Lavender Top  PROCEDURE ONCE         22 1633    22 1634  Gold Top - SST  PROCEDURE ONCE         22 1633    22 1634  Light Blue Top  PROCEDURE ONCE         22 1633    22 1634  CBC Auto Differential  PROCEDURE ONCE         22 1633    22 1633  Oxygen Therapy- Nasal Cannula; 2 LPM; Titrate for SPO2: 92%, Greater Than or Equal To  Continuous PRN,   Status:  Canceled       22 1633    22 1633  sodium chloride 0.9 % flush 10 mL  As Needed         22 1633    --  SCANNED - TELEMETRY           22 0000    --  SCANNED - TELEMETRY           22 0000    --  SCANNED - TELEMETRY           22 0000                Operative/Procedure Notes (last 7 days)  Notes from 22 0936 through 2236   No notes of this type exist for this encounter.            Physician Progress Notes (last 7 days)      Ching Abdul, APRN at 22 1520          DOS: 2022  NAME: Cesilia Lutz   : 1940  PCP: Quinn Cabrera MD    Chief Complaint   Patient presents with   • Altered Mental Status        Stroke    Subjective: Pt seen in follow up, however the problem is new to me.  Patient in bilateral wrist restraints.  EEG tech was in the room as I entered.  Patient was argumentative and very agitated.  No family at bedside.    Objective:  Vital signs:      Vitals:    22 0023 22 0412 22 0949   BP: 111/69 112/55 122/75 108/56   BP Location: Left arm Left arm Left arm Left arm   Patient Position: Lying Lying Lying Lying   Pulse: 86 78 92 87   Resp: 18  18 18   Temp: 99 °F (37.2 °C) 99.1 °F (37.3 °C) 98.8 °F (37.1 °C) 97.7 °F (36.5 °C)   TempSrc: Oral Oral Oral Oral   SpO2: 94% 95% 94% 95%   Weight:       Height:           Current Facility-Administered Medications:   •  acetaminophen  (TYLENOL) tablet 650 mg, 650 mg, Oral, Q4H PRN, Quinn Cabrera MD  •  haloperidol lactate (HALDOL) injection 1 mg, 1 mg, Intravenous, Once PRN, Quinn Cabrera MD  •  melatonin tablet 5 mg, 5 mg, Oral, Nightly PRN, Quinn Cabrera MD, 5 mg at 11/06/22 2048  •  metoprolol tartrate (LOPRESSOR) tablet 25 mg, 25 mg, Oral, Q12H, Caden Sotelo MD, 25 mg at 11/07/22 0936  •  potassium chloride (K-DUR,KLOR-CON) ER tablet 20 mEq, 20 mEq, Oral, Daily, Quinn Cabrera MD, 20 mEq at 11/07/22 0936  •  rosuvastatin (CRESTOR) tablet 20 mg, 20 mg, Oral, Nightly, Quinn Cabrera MD, 20 mg at 11/06/22 2048  •  sodium chloride 0.9 % flush 10 mL, 10 mL, Intravenous, PRN, Davon Winston MD  •  sodium chloride 0.9 % flush 10 mL, 10 mL, Intravenous, Q12H, Quinn Cabrera MD, 10 mL at 11/07/22 0936  •  sodium chloride 0.9 % flush 10 mL, 10 mL, Intravenous, PRN, Quinn Cabrera MD  •  sodium chloride 0.9 % with KCl 20 mEq/L infusion, 50 mL/hr, Intravenous, Continuous, Quinn Cabrera MD, Last Rate: 50 mL/hr at 11/07/22 1245, 50 mL/hr at 11/07/22 1245  •  vitamin B-12 (CYANOCOBALAMIN) tablet 100 mcg, 100 mcg, Oral, Daily, Quinn Cabrera MD, 100 mcg at 11/07/22 0936    PRN meds  •  acetaminophen  •  haloperidol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 subarachnoid hemorrhage. Following for Dr. Cabrera.    Appreciate Neuro attention to pt, MRI brain planned, delirium precautions, requiring soft wrist restraints for pt and staff safety  Amyloid angiopathy?    Suspect previously undiagnosed dementia as playing large role here per Dr. Cabrera  Consider scheduled HS Zyprexa  Will likely require placement at dc     Treating UTI with Rocephin, urine culture not helpful    Had hypotensive/bradycardic episode today, symptomatic, rapid called, responded well to IVF bolus, her BB was held, have stopped now, continue telemetry monitoring for now      · SCDs for DVT prophylaxis.  · Full code.  · Discussed with patient and nursing staff.  · Anticipate discharge to SNU facility, timing yet to be determined.      Haris Pineda MD  [facility]  22  19:36 EDT        Electronically signed by Haris Pineda MD at 22 1945     Rita Wilder PA at 22 1051     Attestation signed by Gerard Prince MD at 22 1526    I have reviewed this documentation and agree.                  DOS: 2022  NAME: Cesilia Lutz   : 1940  PCP: Quinn Cabrera MD  Chief Complaint   Patient presents with   • Altered Mental Status       Chief complaint: delusions  Subjective: Patient lying sideways with legs hanging off the bed.  She is smiling and watching the ceiling.  She cannot tell me why she is in the hospital.  She names mask but not active participant for exam, confused mumbled speech    Objective:  Vital signs: /70 (BP Location: Left arm, Patient Position: Lying)   Pulse 102   Temp 97.8 °F (36.6 °C)  "(Oral)   Resp 18   Ht 149.9 cm (59\")   Wt 51.7 kg (113 lb 15.7 oz)   SpO2 95%   BMI 23.02 kg/m²      Gen: NAD, vitals reviewed  MS: Disoriented to even self, seems to be having visual hallucinations, testing language difficult with degree of confusion, does name mask, confabulation, impaired attention/concentration, no overt neglect   CN: Thinks to threat, PERRL, EOMI, no facial droop, intermittent dysarthria  Motor: Moves extremities spontaneously and equally  Sensory: intact to light touch all 4 ext.  Withdrawal throughout  Reflexes: +2, negative Babinski's    ROS:  No weakness, numbness  No fevers, chills      Laboratory results:  Lab Results   Component Value Date    GLUCOSE 102 (H) 11/03/2022    CALCIUM 10.7 (H) 11/03/2022     11/03/2022    K 4.1 11/03/2022    CO2 23.4 11/03/2022     11/03/2022    BUN 20 11/03/2022    CREATININE 0.91 11/03/2022    EGFRIFAFRI 62 02/18/2022    EGFRIFNONA 54 (L) 02/18/2022    BCR 22.0 11/03/2022    ANIONGAP 13.6 11/03/2022     Lab Results   Component Value Date    WBC 5.29 11/03/2022    HGB 13.5 11/03/2022    HCT 39.9 11/03/2022    MCV 92.4 11/03/2022     11/03/2022     Lab Results   Component Value Date     (H) 08/18/2022     (H) 02/18/2022     (H) 07/02/2021            Review of labs: Urine culture with normal leeann    Review and interpretation of imaging: Reviewed head CT and subarachnoid component of the superior aspect of the left parietal lobe    Workup to date:    Diagnoses:  1.  Altered mental  2.  Suspected dementia  3.  Psychosis    Impression: 82-year-old female with past medical history hypertension, hyperlipidemia, aortic valve stenosis who presented to the ER with daughter with confusion and visual hallucinations.  Apparently was independent and living at home.  Head CT in the ER shows subarachnoid component of the left frontal region.  She needs MRI for further evaluation.    Overnight she received low-dose Zyprexa.  " Today she is Off the bed, pleasantly confused during my encounter, appears to be hallucinating, confabulation and confused speech.  Apparently she became more anxious yesterday afternoon    Plan:  1.  MRI brain.  Ordered Haldol to be given prior  2.  Delirium precautions  3.  Case management for placement options    We will be following along with you peripherally    I spent at least 30 minutes interviewing, examining, and counseling patient.  I independently reviewed documentation, laboratory and diagnostic findings, external documentation where applicable, and formulated treatment plan which was discussed with the patient.      Thank you for this consultation.  Discussed above plan with neuro attending, Dr. Prince who agrees with above plan.  Neurology team is available for concerns or questions.                Electronically signed by Geradr Prince MD at 11/05/22 1526          Consult Notes (last 7 days)      Gerard Prince MD at 11/04/22 1236      Consult Orders    1. Inpatient Neurology Consult General [[id number]] ordered by Dameon Cifuentes MD at 11/03/22 2009               Neurology Consult Note    Consult Date: 11/4/2022    Referring MD: Quinn Cabrera MD    Reason for Consult I have been asked to see the patient in neurological consultation to render advice and opinion regarding hallucinations, abnormal head CT    Cesilia Lutz is a 82 y.o. female with hypertension, hyperlipidemia, no prior diagnosis of dementia.  She developed delusions that someone was breaking into her home as well as some hallucinations of snakes in her house.  She was ultimately brought to the hospital for the symptoms where a CT scan showed a thin sulcal subarachnoid hemorrhage on the left side.  She was admitted for further evaluation.  She has remained pleasantly confused but delusional.  She denies headache or unilateral weakness or numbness.  No witnessed seizures.    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  CN: visual acuity grossly normal, PERRL, EOMI, no facial droop, no dysarthria  Motor: 5/5 throughout upper and lower extremities, normal tone    DATA:    Lab Results   Component Value Date    GLUCOSE 102 (H) 11/03/2022    CALCIUM 10.7 (H) 11/03/2022     11/03/2022    K 4.1 11/03/2022    CO2 23.4 11/03/2022     11/03/2022    BUN 20 11/03/2022    CREATININE 0.91 11/03/2022    EGFRIFAFRI 62 02/18/2022    EGFRIFNONA 54 (L) 02/18/2022    BCR 22.0 11/03/2022    ANIONGAP 13.6 11/03/2022     Lab Results   Component Value Date    WBC 5.29 11/03/2022    HGB 13.5 11/03/2022    HCT 39.9 11/03/2022    MCV 92.4 11/03/2022     11/03/2022       Lab review: B12, TSH ordered    Imaging review: MRI brain with and without contrast ordered    Diagnoses:  Subarachnoid hemorrhage, spontaneous  Delusions  Alzheimer's dementia, late onset, with behavioral disturbance    Comment: I agree with the suspicion for cerebral amyloid angiopathy on the basis of her CT findings.  We will check MRI to try to confirm that.  I think she probably has underlying Alzheimer's disease and this may be moderate to advanced in severity based on her extremely impaired cognitive testing at the bedside.    PLAN:  -MRI brain with without contrast to evaluate for CAA  -Consideration of " starting Aricept  -May need placement; I suspect she has advanced cognitive impairment that is somewhat masked by her extremely pleasant demeanor    Discussed with Dr. Cabrera    Electronically signed by Gerard Prince MD at 11/04/22 2958

## 2022-11-09 NOTE — PLAN OF CARE
Goal Outcome Evaluation:  Plan of Care Reviewed With: patient        Progress: improving  Outcome Evaluation: Pt tolerated treatment well this date. Much improved success from previous visit. Pt required min-SBA for bed mobility, then min-mod A x2 to stand. Pt was able to take a few side steps in each direction, then ambulated 20ft w/ Rw and min A x2. Cues given to increase step lengths. Assist was needed to place L hand onto walker, though pt w/ good  strength.

## 2022-11-09 NOTE — PLAN OF CARE
Problem: Restraint, Nonbehavioral (Nonviolent)  Goal: Absence of Harm or Injury  Outcome: Ongoing, Progressing  Intervention: Implement Least Restrictive Safety Strategies  Recent Flowsheet Documentation  Taken 11/9/2022 0410 by Tamera Ballard RN  Medical Device Protection:   IV pole/bag removed from visual field   tubing secured  Less Restrictive Alternative:   bed alarm in use   calming techniques promoted   environment adjusted   positive reinforcement provided   sensory stimulation limited  De-Escalation Techniques:   increased round frequency   reoriented   stimulation decreased  Diversional Activities: television  Taken 11/9/2022 0240 by Tamera Ballard RN  Medical Device Protection:   IV pole/bag removed from visual field   tubing secured  Less Restrictive Alternative:   bed alarm in use   calming techniques promoted   environment adjusted   positive reinforcement provided   sensory stimulation limited  De-Escalation Techniques:   increased round frequency   quiet time facilitated   reoriented   stimulation decreased  Diversional Activities: television  Taken 11/9/2022 0015 by Tamera Ballard RN  Medical Device Protection:   IV pole/bag removed from visual field   tubing secured  Less Restrictive Alternative:   bed alarm in use   calming techniques promoted   environment adjusted   positive reinforcement provided   sensory stimulation limited  De-Escalation Techniques:   reoriented   stimulation decreased  Diversional Activities: television  Taken 11/8/2022 2230 by Tamera Ballard RN  Medical Device Protection:   IV pole/bag removed from visual field   tubing secured  Less Restrictive Alternative:   bed alarm in use   calming techniques promoted   environment adjusted   positive reinforcement provided   sensory stimulation limited  De-Escalation Techniques:   increased round frequency   reoriented   stimulation decreased  Diversional Activities: television  Taken 11/8/2022 2200 by Tamera Ballard  RN  Medical Device Protection:   IV pole/bag removed from visual field   tubing secured  Less Restrictive Alternative:   bed alarm in use   calming techniques promoted   environment adjusted   positive reinforcement provided   sensory stimulation limited  De-Escalation Techniques:   reoriented   stimulation decreased  Diversional Activities: television  Taken 11/8/2022 2000 by Tamera Ballard RN  Medical Device Protection:   IV pole/bag removed from visual field   tubing secured  Less Restrictive Alternative:   bed alarm in use   calming techniques promoted   environment adjusted   positive reinforcement provided   sensory stimulation limited  De-Escalation Techniques:   diversional activity encouraged   increased round frequency   medication offered   quiet time facilitated   reoriented   stimulation decreased  Diversional Activities: television  Intervention: Protect Dignity, Rights, and Personal Wellbeing  Recent Flowsheet Documentation  Taken 11/8/2022 2000 by Tamera Ballard RN  Trust Relationship/Rapport:   care explained   choices provided  Intervention: Protect Skin and Joint Integrity  Recent Flowsheet Documentation  Taken 11/8/2022 2000 by Tamera Ballard RN  Body Position:   position changed independently   supine   weight shifting   legs elevated  Range of Motion:   active ROM (range of motion) encouraged   ROM (range of motion) performed     Problem: Restraint, Nonbehavioral (Nonviolent)  Goal: Absence of Harm or Injury  Intervention: Implement Least Restrictive Safety Strategies  Recent Flowsheet Documentation  Taken 11/9/2022 0410 by Tamera Ballard RN  Medical Device Protection:   IV pole/bag removed from visual field   tubing secured  Less Restrictive Alternative:   bed alarm in use   calming techniques promoted   environment adjusted   positive reinforcement provided   sensory stimulation limited  De-Escalation Techniques:   increased round frequency   reoriented   stimulation  decreased  Diversional Activities: television  Taken 11/9/2022 0240 by Tamera Ballard RN  Medical Device Protection:   IV pole/bag removed from visual field   tubing secured  Less Restrictive Alternative:   bed alarm in use   calming techniques promoted   environment adjusted   positive reinforcement provided   sensory stimulation limited  De-Escalation Techniques:   increased round frequency   quiet time facilitated   reoriented   stimulation decreased  Diversional Activities: television  Taken 11/9/2022 0015 by Tamera Ballard RN  Medical Device Protection:   IV pole/bag removed from visual field   tubing secured  Less Restrictive Alternative:   bed alarm in use   calming techniques promoted   environment adjusted   positive reinforcement provided   sensory stimulation limited  De-Escalation Techniques:   reoriented   stimulation decreased  Diversional Activities: television  Taken 11/8/2022 2230 by Tamera Ballard RN  Medical Device Protection:   IV pole/bag removed from visual field   tubing secured  Less Restrictive Alternative:   bed alarm in use   calming techniques promoted   environment adjusted   positive reinforcement provided   sensory stimulation limited  De-Escalation Techniques:   increased round frequency   reoriented   stimulation decreased  Diversional Activities: television  Taken 11/8/2022 2200 by Tamera Ballard RN  Medical Device Protection:   IV pole/bag removed from visual field   tubing secured  Less Restrictive Alternative:   bed alarm in use   calming techniques promoted   environment adjusted   positive reinforcement provided   sensory stimulation limited  De-Escalation Techniques:   reoriented   stimulation decreased  Diversional Activities: television  Taken 11/8/2022 2000 by Tamera Ballard RN  Medical Device Protection:   IV pole/bag removed from visual field   tubing secured  Less Restrictive Alternative:   bed alarm in use   calming techniques promoted   environment  adjusted   positive reinforcement provided   sensory stimulation limited  De-Escalation Techniques:   diversional activity encouraged   increased round frequency   medication offered   quiet time facilitated   reoriented   stimulation decreased  Diversional Activities: television     Problem: Restraint, Nonbehavioral (Nonviolent)  Goal: Absence of Harm or Injury  Intervention: Protect Dignity, Rights, and Personal Wellbeing  Recent Flowsheet Documentation  Taken 11/8/2022 2000 by Tamera Ballard RN  Trust Relationship/Rapport:   care explained   choices provided     Problem: Restraint, Nonbehavioral (Nonviolent)  Goal: Absence of Harm or Injury  Intervention: Protect Skin and Joint Integrity  Recent Flowsheet Documentation  Taken 11/8/2022 2000 by Tamera Ballard, RN  Body Position:   position changed independently   supine   weight shifting   legs elevated  Range of Motion:   active ROM (range of motion) encouraged   ROM (range of motion) performed   Goal Outcome Evaluation:  Plan of Care Reviewed With: patient

## 2022-11-09 NOTE — PROGRESS NOTES
DOS: 2022  NAME: Cesilia Lutz   : 1940  PCP: Quinn Cabrera MD    Chief Complaint   Patient presents with   • Altered Mental Status        Stroke    Subjective: No acute events overnight.  She is out of wrist restraints today.  Sitting up in bed and states she feels much better today.  She denies any new weakness, numbness, speech or visual disturbances, or headaches.  No family at bedside    Objective:  Vital signs:      Vitals:    22 2100 22 0020 22 0402 22 0824   BP: 165/84 140/71 148/98 129/87   BP Location: Left arm Left arm Left arm Left arm   Patient Position: Lying Lying Lying Lying   Pulse: 89 75 110 92   Resp: 20 18 18 20   Temp: 98.1 °F (36.7 °C) 99 °F (37.2 °C) 99.2 °F (37.3 °C) 98.7 °F (37.1 °C)   TempSrc: Oral Oral Oral Oral   SpO2: 98% 100% 96% 96%   Weight:       Height:           Current Facility-Administered Medications:   •  acetaminophen (TYLENOL) tablet 650 mg, 650 mg, Oral, Q4H PRN, Quinn Cabrera MD  •  aspirin chewable tablet 81 mg, 81 mg, Oral, Daily, Ching Abdul, ARTIS  •  melatonin tablet 5 mg, 5 mg, Oral, Nightly PRN, Quinn Cabrera MD, 5 mg at 22  •  metoprolol tartrate (LOPRESSOR) tablet 25 mg, 25 mg, Oral, Q12H, Caden Sotelo MD, 25 mg at 22  •  potassium chloride (K-DUR,KLOR-CON) ER tablet 20 mEq, 20 mEq, Oral, Daily, Quinn Cabrera MD, 20 mEq at 22  •  rosuvastatin (CRESTOR) tablet 20 mg, 20 mg, Oral, Nightly, Quinn Cabrera MD, 20 mg at 22  •  sodium chloride 0.9 % flush 10 mL, 10 mL, Intravenous, PRN, Davon Winston MD  •  sodium chloride 0.9 % flush 10 mL, 10 mL, Intravenous, Q12H, Quinn Cabrera MD, 10 mL at 22 0949  •  sodium chloride 0.9 % flush 10 mL, 10 mL, Intravenous, PRN, Quinn Cabrera MD  •  sodium chloride 0.9 % with KCl 20 mEq/L infusion, 50 mL/hr, Intravenous, Continuous, Quinn Cabrera MD, Last Rate: 50 mL/hr at 22 1153, 50 mL/hr at 22 1153  •   vitamin B-12 (CYANOCOBALAMIN) tablet 1,000 mcg, 1,000 mcg, Oral, Daily, Ching Abdul, APRLASHAUN, 1,000 mcg at 11/08/22 0933    PRN meds  •  acetaminophen  •  melatonin  •  sodium chloride  •  sodium chloride    No current facility-administered medications on file prior to encounter.     Current Outpatient Medications on File Prior to Encounter   Medication Sig   • Cyanocobalamin (VITAMIN B 12 PO) Take 1 tablet by mouth Daily.   • estradiol 0.25 MG/0.25GM gel Place  on the skin as directed by provider Daily.   • medroxyPROGESTERone (PROVERA) 2.5 MG tablet Take 2.5 mg by mouth Daily.   • metoprolol succinate XL (TOPROL-XL) 50 MG 24 hr tablet Take 1 tablet by mouth once daily   • potassium chloride (K-DUR,KLOR-CON) 10 MEQ CR tablet Take 2 tablets by mouth once daily   • rosuvastatin (CRESTOR) 20 MG tablet Take 1 tablet by mouth once daily   • triamterene-hydrochlorothiazide (DYAZIDE) 37.5-25 MG per capsule Take 1 capsule by mouth once daily in the morning       General appearance: Elderly female, pleasantly confused NAD, alert and cooperative, well groomed  HEENT: Normocephalic, atraumatic, no masses or tenderness  Resp: Even and unlabored  Skin: warm, dry    Neurological:   MS: oriented to person only, recent/remote memory impaired, normal attention/concentration, language intact- some mumbling, no neglect  CN: visual acuity grossly normal, visual fields full, EOMI, facial sensation equal, no facial droop, tongue midline  Motor: 5/5 in all 4 ext.,  Left wrist drop noted.  Sensory: light touch sensation intact in all 4 ext.  Coordination: Normal finger to nose test on the right, 2/2 wrist drop on the left unable to be performed.  Rapid alternating movements: normal finger to thumb tap on the right, unable to be performed on the left    Laboratory results:  Lab Results   Component Value Date    TSH 1.760 11/06/2022     Lab Results   Component Value Date    HGBA1C 6.1 (H) 08/18/2022     Lab Results   Component Value Date     VVFEOBZO68 303 11/06/2022     Lab Results   Component Value Date    CHOL 211 (H) 11/08/2022    CHLPL 197 08/18/2022    CHLPL 198 02/18/2022    CHLPL 209 (H) 07/02/2021     Lab Results   Component Value Date    TRIG 133 11/08/2022    TRIG 86 08/18/2022    TRIG 151 (H) 02/18/2022     Lab Results   Component Value Date    HDL 42 11/08/2022    HDL 53 08/18/2022    HDL 36 (L) 02/18/2022     Lab Results   Component Value Date     (H) 11/08/2022     (H) 08/18/2022     (H) 02/18/2022     Lab Results   Component Value Date    WBC 9.37 11/07/2022    HGB 13.6 11/07/2022    HCT 41.5 11/07/2022    MCV 93.5 11/07/2022     11/07/2022     Lab Results   Component Value Date    GLUCOSE 124 (H) 11/07/2022    BUN 28 (H) 11/07/2022    CREATININE 0.96 11/07/2022    EGFRIFNONA 54 (L) 02/18/2022    EGFRIFAFRI 62 02/18/2022    BCR 29.2 (H) 11/07/2022    K 3.5 11/07/2022    CO2 20.6 (L) 11/07/2022    CALCIUM 9.7 11/07/2022    PROTENTOTREF 7.0 02/18/2022    ALBUMIN 3.90 11/07/2022    LABIL2 1.4 02/18/2022    AST 35 (H) 11/07/2022    ALT 19 11/07/2022     No results found for: PTT  No results found for: INR, PROTIME  Brief Urine Lab Results  (Last result in the past 365 days)      Color   Clarity   Blood   Leuk Est   Nitrite   Protein   CREAT   Urine HCG        11/03/22 1817 Yellow   Cloudy   Trace   Large (3+)   Negative   Trace                 Review and interpretation of imaging:  CT Head Without Contrast    Result Date: 11/3/2022   There is a small amount of peripheral subarachnoid hemorrhage within a sulcus of the superior aspect of the left parietal lobe.  These findings were discussed with Dr. Cifuentes on 11/03/2022 at approximately 7:50 PM.  Radiation dose reduction techniques were utilized, including automated exposure control and exposure modulation based on body size.  This report was finalized on 11/3/2022 8:39 PM by Dr. Martin Perez M.D.      MRI Brain Without Contrast    Result Date: 11/9/2022  There  is signal loss on the susceptibility weighted sequence involving the left parietal lobe corresponding to the area of subarachnoid hemorrhage with the area of signal loss measuring approximately 2.3 cm in maximum transverse dimension. Adjacent areas of diffusion hyperintensity are appreciated involving the left parietal lobe some of which is likely artifactual secondary to the adjacent subarachnoid hemorrhage, the majority of which represents small cortical acute infarcts. There is also a punctate acute infarct involving the left frontal lobe anterior laterally and the right occipital lobe posterior laterally. Infarcts in multiple vascular distributions would raise the possibility of a proximal or cardiac source. Moderate small vessel ischemic disease is noted.  This report was finalized on 11/9/2022 7:24 AM by Dr. Charles Jeffrey M.D.      XR Chest 1 View    Result Date: 11/3/2022  No focal pulmonary consolidation. Follow-up as clinical indications persist.  This report was finalized on 11/3/2022 5:43 PM by Dr. Duke Campuzano M.D.      Results for orders placed during the hospital encounter of 10/14/22    Adult Transthoracic Echo Complete W/ Cont if Necessary Per Protocol    Interpretation Summary  •  Calculated left ventricular EF = 62% Estimated left ventricular EF was in agreement with the calculated left ventricular EF. Left ventricular systolic function is normal.  •  Left ventricular diastolic function was normal.  •  Calcified aortic valve with moderate aortic valve stenosis  present. Aortic valve area is 0.9 cm2.Peak velocity of the flow distal to the aortic valve is 285.3 cm/s. Aortic valve maximum pressure gradient is 33 mmHg. Aortic valve mean pressure gradient is 18 mmHg. Aortic valve dimensionless index is 0.3 .  •  Estimated right ventricular systolic pressure from tricuspid regurgitation is normal (<35 mmHg).      Impression/Assessment:  This is a 82 year old female with past medical history of  HTN, HLD who presented to the hospital on 11/3/2022 with complaints of confusion and hallucinations. Daughter reported she has noticed some memory issues with the patient over the last few months and her primary physician noted she was evasive regarding direct questions.The day of admission she had told her daughter that she though someone was in her home hiding in the closet and that she thought she was seeing snakes but actually was mistaking this for an extension cord.     She underwent a noncontrast CT head on arrival that revealed a thin sulcal subarachnoid hemorrhage on the left side.  MRI brain ordered due to concern for underlying cerebral amyloid angiopathy.  EEG was also ordered however we d/c'd on 11/7 due to patient being agitated and combative.      She had a rapid response team called on 11/5 after she became less response and noted to be hypotensive and bradycardic.  Multiple BPs were taken with recordings as low 60s/30s and HR in the 50s. She received an IVF bolus and slowly started to improve.     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  Out of restraints.  Again her MRI revealed acute embolic appearing strokes within different vascular territories.  There was also no evidence of cerebral amyloid angiopathy.  Suspect her SAH was likely traumatic.  Her carotid duplex revealed mild stenosis bilaterally otherwise unrevealing.  No need to repeat her echo as she just had one on 10/14.  Will start ASA 81mg, she is over a week from her initial injury.  Zio patch at discharge to monitor for any underlying arrhythmias specifically A. fib and a flutter.  Continue Crestor 20mg, ; goal LDL <70  A1c goal  <7.0.  Neurochecks per stroke protocol  Normalize BP  Stroke Education  HEMA/SCDs  PT/OT/ST  Was going to hold off on starting Aricept trial secondary to episode of severe hypotension and bradycardia as well as concern for worsening hallucinations in the acute setting.  She appears to be doing better today.  I will arrange follow-up with me in 3 months for stroke follow-up.  Therapies as written. CCP for discharge planning. Call RRT for any acute neurological changes. We will sign off, will see again per request.    Case discussed with patient and Dr. Humphreys, and he agrees with plan above.    ARTIS Esparza

## 2022-11-09 NOTE — PLAN OF CARE
Goal Outcome Evaluation:  Plan of Care Reviewed With: patient           Outcome Evaluation: Clinical swallow eval completed. Recommend continue with regular and thins, meds whole with thin/puree. Recommend upright, slow rate, intermittent supervision/assist as needed, alternate liquids and solids. SLP to s/o, please re-consult as warranted.

## 2022-11-09 NOTE — PROGRESS NOTES
[facility] Acute Rehab  Stroke screening per stroke order set via Epic. Please note that this is a screening. The Admission RN's will not actively be following this pt. If you feel that this pt is or will be appropriate for Acute Rehab, please call the Admission Office at [phone] and a full evaluation will be initiated.     Thank You  Connie zavala RN  Acute Rehab Admission Nurse

## 2022-11-09 NOTE — THERAPY TREATMENT NOTE
Patient Name: Cesilia Lutz  : 1940    MRN: [id number]                              Today's Date: 2022       Admit Date: 11/3/2022    Visit Dx:     ICD-10-CM ICD-9-CM   1. Visual hallucination  R44.1 368.16   2. Acute UTI  N39.0 599.0   3. 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   • Impaired glucose tolerance   • Hyperlipidemia   • Abdominal wall hernia   • Nonrheumatic aortic valve stenosis   • Acute UTI   • Visual hallucination   • Subarachnoid hemorrhage (HCC)   • Metabolic encephalopathy   • Cerebral amyloid angiopathy (HCC)   • Moderate dementia with mood disturbance   • Hypotension     Past Medical History:   Diagnosis Date   • Abdominal wall hernia 2014   • Adenoma of right adrenal gland 2014    2.4cm   • Hyperlipidemia    • Hypertension    • IGT (impaired glucose tolerance)    • PVC (premature ventricular contraction)      Past Surgical History:   Procedure Laterality Date   • ANKLE ARTHROPLASTY Right    • BREAST BIOPSY Left 2019    due to Calcifications- Normal   • CARPAL TUNNEL RELEASE     • CHOLECYSTECTOMY     • COLONOSCOPY N/A 2017    Procedure: COLONOSCOPY TO TRANSVERSE COLON WITH COLD SNARE POLYPECTOMY;  Surgeon: Carlos Payton MD;  [facility] ENDOSCOPY;  Service:    • GANGLION CYST EXCISION Left     x 2   • INCISIONAL HERNIA REPAIR  04/15/2019    Dr Shyla Bartholomew      General Information     Row Name 22 1144          Physical Therapy Time and Intention    Document Type therapy note (daily note)  -     Mode of Treatment physical therapy  -     Row Name 22 1144          General Information    Existing Precautions/Restrictions fall  -     Row Name 22 1144          Cognition    Orientation Status (Cognition) oriented x 3  -           User Key  (r) = Recorded By, (t) = Taken By, (c) = Cosigned By     Initials Name Provider Type    Kary Herring PTA Physical Therapist Assistant               Mobility     Row Name 11/09/22 1144          Bed Mobility    Bed Mobility supine-sit;sit-supine  -     Supine-Sit [place] (Bed Mobility) minimum assist (75% patient effort)  -     Sit-Supine [place] (Bed Mobility) standby assist  -     Assistive Device (Bed Mobility) bed rails;head of bed elevated  -     Row Name 11/09/22 1144          Sit-Stand Transfer    Sit-Stand [place] (Transfers) minimum assist (75% patient effort);moderate assist (50% patient effort);2 person assist  -     Assistive Device (Sit-Stand Transfers) walker, front-wheeled  -     Row Name 11/09/22 1144          Gait/Stairs (Locomotion)    [place] Level (Gait) minimum assist (75% patient effort);2 person assist  -     Assistive Device (Gait) walker, front-wheeled  -     Distance in Feet (Gait) 20ft, +a few side steps each direction  -     Deviations/Abnormal Patterns (Gait) bhupendra decreased;stride length decreased  -     Bilateral Gait Deviations forward flexed posture  -     Comment, (Gait/Stairs) cues to increase step lengths  -           User Key  (r) = Recorded By, (t) = Taken By, (c) = Cosigned By    Initials Name Provider Type    Kary Herring PTA Physical Therapist Assistant               Obj/Interventions     Row Name 11/09/22 1146          Motor Skills    Therapeutic Exercise --  seated AP and LAQ x10 reps  -           User Key  (r) = Recorded By, (t) = Taken By, (c) = Cosigned By    Initials Name Provider Type    Kary Herring PTA Physical Therapist Assistant               Goals/Plan    No documentation.                Clinical Impression     Row Name 11/09/22 1146          Pain    Pretreatment Pain Rating 0/10 - no pain  -     Posttreatment Pain Rating 0/10 - no pain  -     Row Name 11/09/22 1146          Positioning and Restraints    Pre-Treatment Position in bed  -      Post Treatment Position bed  -SM     In Bed supine;call light within reach;encouraged to call for assist;exit alarm on  -SM           User Key  (r) = Recorded By, (t) = Taken By, (c) = Cosigned By    Initials Name Provider Type    Kary Herring PTA Physical Therapist Assistant               Outcome Measures     Row Name 11/09/22 1146 11/09/22 1100       How much help from another person do you currently need...    Turning from your back to your side while in flat bed without using bedrails? 3  -SM 3  -BC    Moving from lying on back to sitting on the side of a flat bed without bedrails? 3  -SM 3  -BC    Moving to and from a bed to a chair (including a wheelchair)? 3  -SM 2  -BC    Standing up from a chair using your arms (e.g., wheelchair, bedside chair)? 2  -SM 1  -BC    Climbing 3-5 steps with a railing? 1  -SM 1  -BC    To walk in hospital room? 3  -SM 1  -BC    AM-PAC 6 Clicks Score (PT) 15  - 11  -BC    Highest level of mobility 4 --> Transferred to chair/commode  -SM 4 --> Transferred to chair/commode  -BC    Row Name 11/09/22 1146          Functional Assessment    Outcome Measure Options AM-PAC 6 Clicks Basic Mobility (PT)  -           User Key  (r) = Recorded By, (t) = Taken By, (c) = Cosigned By    Initials Name Provider Type    Kary Herring PTA Physical Therapist Assistant    Bharati Hernandez RN Registered Nurse                             Physical Therapy Education     Title: PT OT SLP Therapies (Done)     Topic: Physical Therapy (Done)     Point: Mobility training (Done)     Learning Progress Summary           Patient Acceptance, E,TB,D, VU,NR by  at 11/9/2022 1147    Acceptance, E,TB,D, NR,NL by  at 11/7/2022 1501                   Point: Home exercise program (Done)     Learning Progress Summary           Patient Acceptance, E,TB,D, VU,NR by  at 11/9/2022 1147                   Point: Body mechanics (Done)     Learning Progress Summary           Patient Acceptance,  E,TB,D, VU,NR by  at 11/9/2022 1147                   Point: Precautions (Done)     Learning Progress Summary           Patient Acceptance, E,TB,D, VU,NR by  at 11/9/2022 1147                               User Key     Initials Effective Dates Name Provider Type Discipline     06/16/21 -  Sanaz Maher, PT Physical Therapist PT     03/07/18 -  Kary Swann PTA Physical Therapist Assistant PT              PT Recommendation and Plan     Plan of Care Reviewed With: patient  Progress: improving  Outcome Evaluation: Pt tolerated treatment well this date. Much improved success from previous visit. Pt required min-SBA for bed mobility, then min-mod A x2 to stand. Pt was able to take a few side steps in each direction, then ambulated 20ft w/ Rw and min A x2. Cues given to increase step lengths. Assist was needed to place L hand onto walker, though pt w/ good  strength.     Time Calculation:    PT Charges     Row Name 11/09/22 1147             Time Calculation    Start Time 0956  -      Stop Time 1026  -      Time Calculation (min) 30 min  -      PT Received On 11/09/22  -      PT - Next Appointment 11/10/22  -            User Key  (r) = Recorded By, (t) = Taken By, (c) = Cosigned By    Initials Name Provider Type     Kary Swann PTA Physical Therapist Assistant              Therapy Charges for Today     Code Description Service Date Service Provider Modifiers Qty    61846440190 HC PT THER PROC EA 15 MIN 11/9/2022 Kary Swann PTA GP 1    97681371548 HC GAIT TRAINING EA 15 MIN 11/9/2022 Kary Swann PTA GP 1    12561075121 HC PT THER SUPP EA 15 MIN 11/9/2022 Kary Swann PTA GP 2          PT G-Codes  Outcome Measure Options: AM-PAC 6 Clicks Basic Mobility (PT)  AM-PAC 6 Clicks Score (PT): 15    Kary Swann PTA  11/9/2022

## 2022-11-09 NOTE — PROGRESS NOTES
"   LOS: 2 days    Patient Care Team:  Quinn Cabrera MD as PCP - General (Internal Medicine)  Quinn Cabrera MD as PCP - Internal Medicine (Internal Medicine)  Xiomara Díaz MD as Consulting Physician (Obstetrics and Gynecology)    Chief Complaint:    Chief Complaint   Patient presents with   • Altered Mental Status     Subjective     Interval History:     Patient Complaints: Delirium worsened over the weekend.  Increased agitation and confusion.  Placed in restraints.  She is calmer today although quite confused.  Not hallucinating at present.  Perhaps a little worse than yesterday.    Objective     Vital Signs  Temp:  [98.1 °F (36.7 °C)-99.2 °F (37.3 °C)] 98.7 °F (37.1 °C)  Heart Rate:  [] 92  Resp:  [16-20] 20  BP: (122-165)/(71-98) 129/87    Flowsheet Rows    Flowsheet Row First Filed Value   Admission Height 149.9 cm (59\") Documented at 11/03/2022 2121   Admission Weight 52 kg (114 lb 10.2 oz) Documented at 11/03/2022 2121          No intake/output data recorded.  I/O last 3 completed shifts:  In: -   Out: 200 [Urine:200]    Intake/Output Summary (Last 24 hours) at 11/9/2022 0851  Last data filed at 11/8/2022 1741  Gross per 24 hour   Intake --   Output 200 ml   Net -200 ml       Physical Exam:   General Appearance:    Alert, and awake but very confused, in no acute distress, , in restraints       Eyes:            Conjunctivae and sclerae normal, no   icterus, PERRLA   Neck:          Lungs:     Clear to auscultation,respirations regular, even and                  unlabored    Heart:    Regular rhythm and normal rate, normal S1 and S2, no            murmur, no gallop, no rub, no click       Abdomen:     Normal bowel sounds, no masses, no organomegaly, soft        non-tender, non-distended, no guarding, no rebound                tenderness       Extremities:    Pulses:        Lymph nodes:          Results Review:    I reviewed the patient's new clinical results.  Results from last 7 days   Lab Units " 11/07/22  0531 11/06/22 0724 11/03/22  1700   SODIUM mmol/L 143 141 140   POTASSIUM mmol/L 3.5 3.5 4.1   CHLORIDE mmol/L 106 106 103   CO2 mmol/L 20.6* 19.0* 23.4   BUN mg/dL 28* 29* 20   CREATININE mg/dL 0.96 0.96 0.91   CALCIUM mg/dL 9.7 9.6 10.7*   BILIRUBIN mg/dL 1.2 0.7 1.2   ALK PHOS U/L 87 89 108   ALT (SGPT) U/L 19 18 13   AST (SGOT) U/L 35* 36* 23   GLUCOSE mg/dL 124* 107* 102*       Estimated Creatinine Clearance: 36.2 mL/min (by C-G formula based on SCr of 0.96 mg/dL).    Results from last 7 days   Lab Units 11/06/22 0724 11/03/22  1700   MAGNESIUM mg/dL 2.0 1.9             Results from last 7 days   Lab Units 11/07/22 0531 11/06/22 0724 11/03/22  1700   WBC 10*3/mm3 9.37 9.85 5.29   HEMOGLOBIN g/dL 13.6 13.6 13.5   PLATELETS 10*3/mm3 286 294 308               Imaging Results (Last 24 Hours)     Procedure Component Value Units Date/Time    MRI Brain Without Contrast [[id number]] Collected: 11/08/22 1115     Updated: 11/09/22 0727    Narrative:      MRI EXAMINATION OF THE BRAIN WITHOUT CONTRAST     HISTORY: Subarachnoid hemorrhage, follow-up.     COMPARISON: CT head 11/03/2022.     TECHNIQUE: An MRI examination of the brain was performed utilizing  sagittal T1, axial diffusion, T1, T2, T2 FLAIR and susceptibility  weighted imaging.     FINDINGS: The diffusion sequence demonstrates a 2-3 mm area of increased  signal intensity involving the left frontal lobe anterior laterally.  Cortical area of increased signal intensity measuring 2 mm in size is  appreciated involving the right occipital lobe posterior laterally.     Gyriform areas of increased signal intensity are noted involving the  left parietal lobe superior laterally corresponding to the area of  subarachnoid hemorrhage noted on the CT examination of 11/03/2022. The  area of increased signal intensity measures approximately 2 cm in  maximum transverse dimension. On the axial T2 FLAIR sequence areas of  increased signal intensity are appreciated  involving the cortex of the  left parietal lobe as well as involving the sulci. The signal loss  involving the left parietal lobe is appreciated on the susceptibility  weighted sequence corresponding to the area of subarachnoid hemorrhage.  This measures approximately 2.3 cm in maximum transverse dimension.     Multiple areas of increased signal intensity are appreciated involving  the white matter of the cerebral hemispheres bilaterally on the axial T2  FLAIR sequence consistent with moderate small vessel ischemic disease.       Impression:      There is signal loss on the susceptibility weighted sequence  involving the left parietal lobe corresponding to the area of  subarachnoid hemorrhage with the area of signal loss measuring  approximately 2.3 cm in maximum transverse dimension. Adjacent areas of  diffusion hyperintensity are appreciated involving the left parietal  lobe some of which is likely artifactual secondary to the adjacent  subarachnoid hemorrhage, the majority of which represents small cortical  acute infarcts. There is also a punctate acute infarct involving the  left frontal lobe anterior laterally and the right occipital lobe  posterior laterally. Infarcts in multiple vascular distributions would  raise the possibility of a proximal or cardiac source. Moderate small  vessel ischemic disease is noted.     This report was finalized on 11/9/2022 7:24 AM by Dr. Charles Jeffrey M.D.           metoprolol tartrate, 25 mg, Oral, Q12H  potassium chloride, 20 mEq, Oral, Daily  rosuvastatin, 20 mg, Oral, Nightly  sodium chloride, 10 mL, Intravenous, Q12H  vitamin B-12, 1,000 mcg, Oral, Daily      sodium chloride 0.9 % with KCl 20 mEq, 50 mL/hr, Last Rate: 50 mL/hr (11/08/22 1153)        Medication Review:   Current Facility-Administered Medications   Medication Dose Route Frequency Provider Last Rate Last Admin   • acetaminophen (TYLENOL) tablet 650 mg  650 mg Oral Q4H PRN Quinn Cabrera MD       •  melatonin tablet 5 mg  5 mg Oral Nightly PRN Quinn Cabrera MD   5 mg at 11/06/22 2048   • metoprolol tartrate (LOPRESSOR) tablet 25 mg  25 mg Oral Q12H Caden Sotelo MD   25 mg at 11/08/22 0932   • potassium chloride (K-DUR,KLOR-CON) ER tablet 20 mEq  20 mEq Oral Daily Quinn Cabrera MD   20 mEq at 11/08/22 0932   • rosuvastatin (CRESTOR) tablet 20 mg  20 mg Oral Nightly Quinn Cabrera MD   20 mg at 11/06/22 2048   • sodium chloride 0.9 % flush 10 mL  10 mL Intravenous PRN Davon Winston MD       • sodium chloride 0.9 % flush 10 mL  10 mL Intravenous Q12H Quinn Cabrera MD   10 mL at 11/08/22 0949   • sodium chloride 0.9 % flush 10 mL  10 mL Intravenous PRN Quinn Cabrera MD       • sodium chloride 0.9 % with KCl 20 mEq/L infusion  50 mL/hr Intravenous Continuous Quinn Cabrera MD 50 mL/hr at 11/08/22 1153 50 mL/hr at 11/08/22 1153   • vitamin B-12 (CYANOCOBALAMIN) tablet 1,000 mcg  1,000 mcg Oral Daily Ching Abdul APRN   1,000 mcg at 11/08/22 0933       Assessment & Plan       Acute UTI    Hypertension    Impaired glucose tolerance    Hyperlipidemia    Nonrheumatic aortic valve stenosis    Visual hallucination    Subarachnoid hemorrhage (HCC)    Metabolic encephalopathy    Cerebral amyloid angiopathy (HCC)    Moderate dementia with mood disturbance    Hypotension    Dementia likely related to cerebral amyloid angiopathy.  Tiny subarachnoid hemorrhage related to this process.  MRI of brain that demonstrated several small cortical infarcts in different circulation beds raising the possibility of embolic source.  A TTE demonstrated mild aortic stenosis with a peak gradient of 33 mm and a mean gradient of 18 mm along with a EDUARDO of 0.9 cm.  No arrhythmias noted on the monitor.  Long-term anticoagulation would be problematic given the diagnosis of cerebral amyloid angiopathy as well as the current subarachnoid hemorrhage.  She has scheduled Zyprexa at bedtime.  She is going to require placement at  discharge.  Unable to place as long as she is in restraints.  Discussed with daughter and in no CODE STATUS will be placed which is appropriate.  Discussed the need for placement, preferably a memory care unit.  48-hour Holter ordered to check for atrial arrhythmias.  We will have psychiatry help manage delirium.      PPE today included face mask.    Quinn Cabrera MD  11/09/22  08:51 EST

## 2022-11-09 NOTE — PROGRESS NOTES
Continued Stay Note  [facility]     Patient Name: Cesilia Lutz  MRN: [id number]  Today's Date: 11/9/2022    Admit Date: 11/3/2022    Plan: SNF referrals pending   Discharge Plan     Row Name 11/09/22 1020       Plan    Plan SNF referrals pending    Patient/Family in Agreement with Plan yes    Plan Comments SNF referrals pending medical/behavioral stability. Restraints continue. CCP to follow. Yani Marrufo LCSW               Discharge Codes    No documentation.               Expected Discharge Date and Time     Expected Discharge Date Expected Discharge Time    Nov 11, 2022             Renee Marrufo LCSW

## 2022-11-09 NOTE — THERAPY EVALUATION
Acute Care - Speech Language Pathology   Swallow Initial Evaluation [facility]     Patient Name: Cseilia Lutz  : 1940  MRN: [id number]  Today's Date: 2022               Admit Date: 11/3/2022    Visit Dx:     ICD-10-CM ICD-9-CM   1. Visual hallucination  R44.1 368.16   2. Acute UTI  N39.0 599.0   3. 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   • Impaired glucose tolerance   • Hyperlipidemia   • Abdominal wall hernia   • Nonrheumatic aortic valve stenosis   • Acute UTI   • Visual hallucination   • Subarachnoid hemorrhage (HCC)   • Metabolic encephalopathy   • Cerebral amyloid angiopathy (HCC)   • Moderate dementia with mood disturbance   • Hypotension     Past Medical History:   Diagnosis Date   • Abdominal wall hernia 2014   • Adenoma of right adrenal gland 2014    2.4cm   • Hyperlipidemia    • Hypertension    • IGT (impaired glucose tolerance)    • PVC (premature ventricular contraction)      Past Surgical History:   Procedure Laterality Date   • ANKLE ARTHROPLASTY Right    • BREAST BIOPSY Left 2019    due to Calcifications- Normal   • CARPAL TUNNEL RELEASE     • CHOLECYSTECTOMY     • COLONOSCOPY N/A 2017    Procedure: COLONOSCOPY TO TRANSVERSE COLON WITH COLD SNARE POLYPECTOMY;  Surgeon: Carlos Payton MD;  Location: [facility] ENDOSCOPY;  Service:    • GANGLION CYST EXCISION Left     x 2   • INCISIONAL HERNIA REPAIR  04/15/2019    Dr Shyla Bartholomew       SLP Recommendation and Plan  SLP Swallowing Diagnosis: swallow WFL/no suspected pharyngeal impairment (22 1400)  SLP Diet Recommendation: regular textures, thin liquids (22 1400)  Recommended Precautions and Strategies: upright posture during/after eating, small bites of food and sips of liquid, general aspiration precautions (22 1400)  SLP Rec. for Method of Medication  "Administration: meds whole, meds crushed, with thin liquids, with puree, as tolerated (11/09/22 1400)     Monitor for Signs of Aspiration: yes, notify SLP if any concerns (11/09/22 1400)     Swallow Criteria for Skilled Therapeutic Interventions Met: baseline status (11/09/22 1400)  Anticipated Discharge Disposition (SLP): unknown (11/09/22 1400)  Rehab Potential/Prognosis, Swallowing: good, to achieve stated therapy goals (11/09/22 1400)  Therapy Frequency (Swallow): evaluation only (11/09/22 1400)  Predicted Duration Therapy Intervention (Days): until discharge (11/09/22 1400)                                        Plan of Care Reviewed With: patient  Outcome Evaluation: Clinical swallow eval completed. Recommend continue with regular and thins, meds whole with thin/puree. Recommend upright, slow rate, intermittent supervision/assist as needed, alternate liquids and solids. SLP to s/o, please re-consult as warranted.      SWALLOW EVALUATION (last 72 hours)     SLP Adult Swallow Evaluation     Row Name 11/09/22 1400                   Rehab Evaluation    Document Type evaluation  -OC        Subjective Information no complaints  -OC        Patient Observations alert;agree to therapy;cooperative  -OC        Patient Effort good  -OC        Symptoms Noted During/After Treatment none  -OC           General Information    Patient Profile Reviewed yes  -OC        Pertinent History Of Current Problem Patient admitted with acute UTI. MRI + \"There is signal loss on the susceptibility weighted sequence  involving the left parietal lobe corresponding to the area of  subarachnoid hemorrhage with the area of signal loss measuring  approximately 2.3 cm in maximum transverse dimension. Adjacent areas of  diffusion hyperintensity are appreciated involving the left parietal  lobe some of which is likely artifactual secondary to the adjacent  subarachnoid hemorrhage, the majority of which represents small cortical  acute infarcts\"  " -OC        Current Method of Nutrition regular textures;thin liquids  -OC        Precautions/Limitations, Vision for purposes of eval;WFL  -OC        Precautions/Limitations, Hearing hearing impairment, bilaterally  -OC        Prior Level of Function-Communication cognitive-linguistic impairment;other (see comments)  baseline moderate dementia  -OC        Prior Level of Function-Swallowing no diet consistency restrictions  -OC        Plans/Goals Discussed with patient;agreed upon  -OC        Barriers to Rehab none identified  -OC        Patient's Goals for Discharge patient did not state  -OC        Family Goals for Discharge family did not state  -OC           Pain Scale: Numbers Pre/Post-Treatment    Pretreatment Pain Rating 0/10 - no pain  -OC        Posttreatment Pain Rating 0/10 - no pain  -OC           Oral Motor Structure and Function    Dentition Assessment natural, present and adequate  -OC        Secretion Management WNL/WFL  -OC           Clinical Swallow Eval    Clinical Swallow Evaluation Summary Patient presents with functional swallow. Patient with no overt s/s aspiration thin via straw, puree, mechanical soft, and regular textures. Patient with thin trials provided reclined/to the left and upright. Positioning did not impact swallow at this time, however feel reclined/leaning to left increased risks of aspiration. Patient's daughter present prior to eval but then left.  -OC           SLP Evaluation Clinical Impression    SLP Swallowing Diagnosis swallow WFL/no suspected pharyngeal impairment  -OC        Functional Impact no impact on function  -OC        Rehab Potential/Prognosis, Swallowing good, to achieve stated therapy goals  -OC        Swallow Criteria for Skilled Therapeutic Interventions Met baseline status  -OC           Recommendations    Therapy Frequency (Swallow) evaluation only  -OC        Predicted Duration Therapy Intervention (Days) until discharge  -OC        SLP Diet Recommendation  regular textures;thin liquids  -OC        Recommended Precautions and Strategies upright posture during/after eating;small bites of food and sips of liquid;general aspiration precautions  -OC        Oral Care Recommendations Oral Care BID/PRN  -OC        SLP Rec. for Method of Medication Administration meds whole;meds crushed;with thin liquids;with puree;as tolerated  -OC        Monitor for Signs of Aspiration yes;notify SLP if any concerns  -OC        Anticipated Discharge Disposition (SLP) unknown  -OC              User Key  (r) = Recorded By, (t) = Taken By, (c) = Cosigned By    Initials Name Effective Dates    Deborah Jeffries MA, CCC-SLP 06/16/21 -                 EDUCATION  The patient has been educated in the following areas:   Dysphagia (Swallowing Impairment).              Time Calculation:    Time Calculation- SLP     Row Name 11/09/22 1455             Time Calculation- SLP    SLP Start Time 1400  -OC      SLP Received On 11/09/22  -OC         Untimed Charges    SLP Eval/Re-eval  ST Eval Oral Pharyng Swallow - 81288  -OC      21204-QG Eval Oral Pharyng Swallow Minutes 60  -OC         Total Minutes    Untimed Charges Total Minutes 60  -OC       Total Minutes 60  -OC            User Key  (r) = Recorded By, (t) = Taken By, (c) = Cosigned By    Initials Name Provider Type    OC Deborah Whitley MA,NICOLA-SLP Speech and Language Pathologist                Therapy Charges for Today     Code Description Service Date Service Provider Modifiers Qty    [id number]  ST EVAL ORAL PHARYNG SWALLOW 4 11/9/2022 Deborah Whitley MA,NICOLA-SLP GN 1               Deborah Whitley MA, CCC-SLP  11/9/2022

## 2022-11-09 NOTE — CONSULTS
IDENTIFYING INFORMATION: The patient is an 82-year-old white female admitted with mental status changes related to a diagnosis of dementia and exacerbated by an acute UTI.    CHIEF COMPLAINT: None given    INFORMANT: Patient and chart    RELIABILITY: Fair    HISTORY OF PRESENT ILLNESS: The patient is an 82-year-old white female admitted with mental status changes related to diagnosis of newly diagnosed dementia and a urinary tract infection.  She has had some behavioral issues since hospitalization and has required wrist restraints which are currently in place.  When seen today the patient is sleeping soundly and cannot be awakened for interview.    PAST PSYCHIATRIC HISTORY: The patient had not previously carried a diagnosis of a dementing illness and has no other significant psychiatric history.  Neurology and Dr. Alan feels that the patient may be suffering from amyloid angiopathy based on CT findings..    PAST MEDICAL HISTORY: Significant for history of hypertension, dyslipidemia, B12 deficiency    MEDICATIONS:   Current Facility-Administered Medications   Medication Dose Route Frequency Provider Last Rate Last Admin    acetaminophen (TYLENOL) tablet 650 mg  650 mg Oral Q4H PRN Quinn Cabrera MD   650 mg at 11/09/22 0935    aspirin chewable tablet 81 mg  81 mg Oral Daily Ching Abdul APRN   81 mg at 11/09/22 0950    melatonin tablet 5 mg  5 mg Oral Nightly PRN Quinn Cabrera MD   5 mg at 11/06/22 2048    metoprolol tartrate (LOPRESSOR) tablet 25 mg  25 mg Oral Q12H Caden Sotelo MD   25 mg at 11/09/22 0935    potassium chloride (K-DUR,KLOR-CON) ER tablet 20 mEq  20 mEq Oral Daily Quinn Cabrera MD   20 mEq at 11/09/22 0935    rosuvastatin (CRESTOR) tablet 20 mg  20 mg Oral Nightly Quinn Cabrera MD   20 mg at 11/06/22 2048    sodium chloride 0.9 % flush 10 mL  10 mL Intravenous PRN Davon Winston MD        sodium chloride 0.9 % flush 10 mL  10 mL Intravenous Q12H Quinn Cabrera MD   10 mL at  11/08/22 0949    sodium chloride 0.9 % flush 10 mL  10 mL Intravenous PRN Quinn Cabrera MD        sodium chloride 0.9 % with KCl 20 mEq/L infusion  50 mL/hr Intravenous Continuous Quinn Cabrera MD 50 mL/hr at 11/08/22 1153 50 mL/hr at 11/08/22 1153    vitamin B-12 (CYANOCOBALAMIN) tablet 1,000 mcg  1,000 mcg Oral Daily Ching Abdul APRN   1,000 mcg at 11/09/22 0939         ALLERGIES: None    FAMILY HISTORY: Noncontributory    SOCIAL HISTORY: Patient had been living with daughter.  No reported use of alcohol tobaccos or street drug    MENTAL STATUS EXAM: The patient is a soundly sleeping white female with soft wrist restraints in place.  Attempts to awaken her are unsuccessful.    ASSETS/LIABILITIES: To be assessed    DIAGNOSTIC IMPRESSION: Primary dementia Alzheimer's type with behavioral disturbance, cerebral amyloid angiopathy, medical problems described previously    PLAN: I will start the patient on a scheduled dose of nighttime Haldol and in agreement with initiation of Aricept to slow the progression of her illness.  I will continue to follow up with you and hope to be able to awaken the patient tomorrow for more thorough evaluation of her cognition and memory.  Thank you for the opportunity to see her

## 2022-11-10 NOTE — PLAN OF CARE
Goal Outcome Evaluation:      VSS. Patient more confuse at night. She refused to take but one medication during the shift. Patient has a tendency to scream when attempting to reason and redirect. Patient refused IV placement attempts and telemetry leads to be placed. High falls precaution remains in place.              Problem: Adult Inpatient Plan of Care  Goal: Plan of Care Review  Outcome: Ongoing, Progressing  Goal: Patient-Specific Goal (Individualized)  Outcome: Ongoing, Progressing  Goal: Absence of Hospital-Acquired Illness or Injury  Outcome: Ongoing, Progressing  Intervention: Identify and Manage Fall Risk  Recent Flowsheet Documentation  Taken 11/10/2022 0400 by Annie Murcia RN  Safety Promotion/Fall Prevention: safety round/check completed  Taken 11/10/2022 0230 by Annie Murcia RN  Safety Promotion/Fall Prevention: safety round/check completed  Taken 11/10/2022 0011 by Annie Murcia RN  Safety Promotion/Fall Prevention: safety round/check completed  Taken 11/9/2022 2208 by Annie Murcia RN  Safety Promotion/Fall Prevention: safety round/check completed  Taken 11/9/2022 2000 by Annie Murcia RN  Safety Promotion/Fall Prevention:   activity supervised   assistive device/personal items within reach   fall prevention program maintained   nonskid shoes/slippers when out of bed   safety round/check completed  Intervention: Prevent Skin Injury  Recent Flowsheet Documentation  Taken 11/10/2022 0400 by Annie Murcia RN  Body Position: position changed independently  Taken 11/10/2022 0230 by Annie Murcia RN  Body Position: position changed independently  Taken 11/10/2022 0011 by Annie Murcia RN  Body Position:   position changed independently   weight shifting  Taken 11/9/2022 2208 by Annie Murcia RN  Body Position:   tilted   right  Taken 11/9/2022 2000 by Annie Murcia RN  Body Position:   weight shifting   sitting up in bed  Intervention: Prevent and Manage VTE (Venous  Thromboembolism) Risk  Recent Flowsheet Documentation  Taken 11/9/2022 2000 by Annie Murcia RN  Activity Management: activity adjusted per tolerance  VTE Prevention/Management:   bilateral   sequential compression devices on  Goal: Optimal Comfort and Wellbeing  Outcome: Ongoing, Progressing  Intervention: Provide Person-Centered Care  Recent Flowsheet Documentation  Taken 11/9/2022 2208 by Annie Murcia RN  Trust Relationship/Rapport: care explained  Goal: Readiness for Transition of Care  Outcome: Ongoing, Progressing     Problem: Fall Injury Risk  Goal: Absence of Fall and Fall-Related Injury  Outcome: Ongoing, Progressing  Intervention: Identify and Manage Contributors  Recent Flowsheet Documentation  Taken 11/9/2022 2000 by Annie Murcia RN  Medication Review/Management: medications reviewed  Intervention: Promote Injury-Free Environment  Recent Flowsheet Documentation  Taken 11/10/2022 0400 by Annie Murcia RN  Safety Promotion/Fall Prevention: safety round/check completed  Taken 11/10/2022 0230 by Annie Murcia RN  Safety Promotion/Fall Prevention: safety round/check completed  Taken 11/10/2022 0011 by Annie Murcia RN  Safety Promotion/Fall Prevention: safety round/check completed  Taken 11/9/2022 2208 by Annie Murcia RN  Safety Promotion/Fall Prevention: safety round/check completed  Taken 11/9/2022 2000 by Annie Murcia RN  Safety Promotion/Fall Prevention:   activity supervised   assistive device/personal items within reach   fall prevention program maintained   nonskid shoes/slippers when out of bed   safety round/check completed     Problem: Hypertension Comorbidity  Goal: Blood Pressure in Desired Range  Outcome: Ongoing, Progressing  Intervention: Maintain Blood Pressure Management  Recent Flowsheet Documentation  Taken 11/9/2022 2000 by Annie Murcia RN  Medication Review/Management: medications reviewed     Problem: Skin Injury Risk Increased  Goal: Skin Health and  Integrity  Outcome: Ongoing, Progressing  Intervention: Optimize Skin Protection  Recent Flowsheet Documentation  Taken 11/10/2022 0400 by Annie Murcia RN  Head of Bed (HOB) Positioning: HOB at 15 degrees  Taken 11/10/2022 0230 by Annie Murcia RN  Head of Bed (HOB) Positioning: HOB flat  Taken 11/10/2022 0011 by Annie Murcia RN  Head of Bed (HOB) Positioning: HOB at 15 degrees  Taken 11/9/2022 2208 by Annie Murcia RN  Head of Bed (HOB) Positioning: HOB at 20-30 degrees  Taken 11/9/2022 2000 by Annie Murcia RN  Pressure Reduction Techniques:   frequent weight shift encouraged   weight shift assistance provided  Head of Bed (HOB) Positioning: HOB at 20-30 degrees  Pressure Reduction Devices: alternating pressure pump (ADD)

## 2022-11-10 NOTE — PLAN OF CARE
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 Pmhx includes primary dementia Alzheimer's type with behavioral disturbance, cerebral amyloid angiopathy. The pt is very pleasantly confused. Trying to leave the hospital. Per the chart, the pt was very independent until recent memory deficits began. Today, the pt required Min A for bed mobility. Min-Mod A x1-2 for OOB mobility using a walker. Mod A for toileting/hygiene due to her frequently becoming distracted. The pt voiced seeing birds and other people in the room with her at times. L wrist remains in wrist drop - unclear if this is acute or chronic. OT recommends continued skilled inpatient OT services during her hospital admission - recommend acute rehab due to her balance deficits and previous independence.    Patient was not wearing a face mask during this therapy encounter. Therapist used appropriate personal protective equipment including mask and gloves.  Mask used was standard procedure mask. Appropriate PPE was worn during the entire therapy session. Hand hygiene was completed before and after therapy session. Patient is not in enhanced droplet precautions.

## 2022-11-10 NOTE — PROGRESS NOTES
[facility]  Spoke with JOEL Lomeli. States family prefers subacute rehab as pt lives alone. Subacute Rehab has started precert. Will sign off    Connie Sequeira RN  Acute Rehab Admission Nurse

## 2022-11-10 NOTE — PROGRESS NOTES
The patient is resting comfortably when seen today and cannot be awakened for interview.  Unfortunately she continues to exhibit episodes of agitation especially at night and has been declining medications.  Accordingly I will leave orders for intramuscular dose of as needed Haldol to address behavioral issues related to her delirium superimposed upon her dementing illness..

## 2022-11-10 NOTE — THERAPY TREATMENT NOTE
Patient Name: Cesilia Lutz  : 1940    MRN: [id number]                              Today's Date: 11/10/2022       Admit Date: 11/3/2022    Visit Dx:     ICD-10-CM ICD-9-CM   1. Visual hallucination  R44.1 368.16   2. Acute UTI  N39.0 599.0   3. 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   • Impaired glucose tolerance   • Hyperlipidemia   • Abdominal wall hernia   • Nonrheumatic aortic valve stenosis   • Visual hallucination   • Subarachnoid hemorrhage (HCC)   • Metabolic encephalopathy   • Cerebral amyloid angiopathy (HCC)   • Moderate dementia with mood disturbance   • Hypotension     Past Medical History:   Diagnosis Date   • Abdominal wall hernia 2014   • Adenoma of right adrenal gland 2014    2.4cm   • Hyperlipidemia    • Hypertension    • IGT (impaired glucose tolerance)    • PVC (premature ventricular contraction)      Past Surgical History:   Procedure Laterality Date   • ANKLE ARTHROPLASTY Right    • BREAST BIOPSY Left 2019    due to Calcifications- Normal   • CARPAL TUNNEL RELEASE     • CHOLECYSTECTOMY     • COLONOSCOPY N/A 2017    Procedure: COLONOSCOPY TO TRANSVERSE COLON WITH COLD SNARE POLYPECTOMY;  Surgeon: Carlos Payton MD;  Location: [facility] ENDOSCOPY;  Service:    • GANGLION CYST EXCISION Left     x 2   • INCISIONAL HERNIA REPAIR  04/15/2019    Dr Shyla Bartholomew      General Information     Row Name 11/10/22 1258          Physical Therapy Time and Intention    Document Type therapy note (daily note)  -     Mode of Treatment physical therapy  -     Row Name 11/10/22 1258          General Information    Existing Precautions/Restrictions fall  -     Row Name 11/10/22 1258          Cognition    Orientation Status (Cognition) oriented to;person;place  some memory and attention deficits  -           User Key  (r) = Recorded By,  (t) = Taken By, (c) = Cosigned By    Initials Name Provider Type    Kary Herring PTA Physical Therapist Assistant               Mobility     Row Name 11/10/22 1259          Bed Mobility    Bed Mobility supine-sit  -     Supine-Sit Gray (Bed Mobility) minimum assist (75% patient effort)  -     Assistive Device (Bed Mobility) bed rails;head of bed elevated  -     Row Name 11/10/22 1259          Sit-Stand Transfer    Sit-Stand Gray (Transfers) minimum assist (75% patient effort);2 person assist  -     Assistive Device (Sit-Stand Transfers) walker, front-wheeled  -     Row Name 11/10/22 1259          Gait/Stairs (Locomotion)    Gray Level (Gait) minimum assist (75% patient effort);2 person assist  -     Assistive Device (Gait) walker, front-wheeled  -     Distance in Feet (Gait) 60  -SM     Deviations/Abnormal Patterns (Gait) bhupendra decreased;stride length decreased;festinating/shuffling  -     Bilateral Gait Deviations forward flexed posture  -     Comment, (Gait/Stairs) cues to increase step lengths; 1 LOB requiring mod A to correct when entering bathroom  -           User Key  (r) = Recorded By, (t) = Taken By, (c) = Cosigned By    Initials Name Provider Type    Kary Herring PTA Physical Therapist Assistant               Obj/Interventions    No documentation.                Goals/Plan    No documentation.                Clinical Impression     Row Name 11/10/22 1301          Pain    Pretreatment Pain Rating 0/10 - no pain  -SM     Posttreatment Pain Rating 0/10 - no pain  -     Row Name 11/10/22 1301          Positioning and Restraints    Pre-Treatment Position in bed  -     Post Treatment Position chair  -SM     In Chair reclined;call light within reach;encouraged to call for assist;exit alarm on;notified nsg  -           User Key  (r) = Recorded By, (t) = Taken By, (c) = Cosigned By    Initials Name Provider Type    Kary Herring PTA  Physical Therapist Assistant               Outcome Measures     Row Name 11/10/22 1302          How much help from another person do you currently need...    Turning from your back to your side while in flat bed without using bedrails? 3  -SM     Moving from lying on back to sitting on the side of a flat bed without bedrails? 3  -SM     Moving to and from a bed to a chair (including a wheelchair)? 3  -SM     Standing up from a chair using your arms (e.g., wheelchair, bedside chair)? 3  -SM     Climbing 3-5 steps with a railing? 1  -SM     To walk in hospital room? 2  -SM     AM-PAC 6 Clicks Score (PT) 15  -SM     Highest level of mobility 4 --> Transferred to chair/commode  -     Row Name 11/10/22 1302          Functional Assessment    Outcome Measure Options AM-PAC 6 Clicks Basic Mobility (PT)  -           User Key  (r) = Recorded By, (t) = Taken By, (c) = Cosigned By    Initials Name Provider Type    Kary Herring, ALLEY Physical Therapist Assistant                             Physical Therapy Education     Title: PT OT SLP Therapies (Done)     Topic: Physical Therapy (Done)     Point: Mobility training (Done)     Learning Progress Summary           Patient Acceptance, E,TB,D, VU,NR by  at 11/10/2022 1303    Acceptance, E,TB,D, VU,NR by  at 11/9/2022 1147    Acceptance, E,TB,D, NR,NL by  at 11/7/2022 1501                   Point: Home exercise program (Done)     Learning Progress Summary           Patient Acceptance, E,TB,D, VU,NR by  at 11/10/2022 1303    Acceptance, E,TB,D, VU,NR by  at 11/9/2022 1147                   Point: Body mechanics (Done)     Learning Progress Summary           Patient Acceptance, E,TB,D, VU,NR by  at 11/10/2022 1303    Acceptance, E,TB,D, VU,NR by  at 11/9/2022 1147                   Point: Precautions (Done)     Learning Progress Summary           Patient Acceptance, E,TB,D, VU,NR by  at 11/10/2022 1303    Acceptance, E,TB,D, VU,NR by  at 11/9/2022 1147                                User Key     Initials Effective Dates Name Provider Type Discipline    EJ 06/16/21 -  Sanaz Maher, PT Physical Therapist PT     03/07/18 -  Kary Swann PTA Physical Therapist Assistant PT              PT Recommendation and Plan     Plan of Care Reviewed With: patient  Progress: improving  Outcome Evaluation: Pt tolerated treatment well this date. Increased gait distance to 60ft w/ Rw and min A x2, though required mod A to correct 1 LOb when entering the bathroom. Pt demonstrating shuffled gait pattern, and at one point attmepted to wrap leg around the walker. Several cues for safety throughout, and cues to stay on task. Memory and attention deficits limiting at times. Would recommend acute rehab upon d/c.     Time Calculation:    PT Charges     Row Name 11/10/22 1305             Time Calculation    Start Time 1107  -      Stop Time 1131  -      Time Calculation (min) 24 min  -      PT Received On 11/10/22  -      PT - Next Appointment 11/11/22  -            User Key  (r) = Recorded By, (t) = Taken By, (c) = Cosigned By    Initials Name Provider Type     Kary Swann PTA Physical Therapist Assistant              Therapy Charges for Today     Code Description Service Date Service Provider Modifiers Qty    43497112860 HC PT THER PROC EA 15 MIN 11/9/2022 Shree Kary Sabine, PTA GP 1    70125161914 HC GAIT TRAINING EA 15 MIN 11/9/2022 Kary Swann Sabine, PTA GP 1    38887177716 HC PT THER SUPP EA 15 MIN 11/9/2022 Kary Swann, PTA GP 2    80195641801 HC GAIT TRAINING EA 15 MIN 11/10/2022 Mamta Swannah Sabine, PTA GP 1    46552874862 HC PT THERAPEUTIC ACT EA 15 MIN 11/10/2022 Kary Swann Sabine, PTA GP 1    61832809890 HC PT THER SUPP EA 15 MIN 11/10/2022 Kary Swann, ALLEY GP 2          PT G-Codes  Outcome Measure Options: AM-PAC 6 Clicks Basic Mobility (PT)  AM-PAC 6 Clicks Score (PT): 15    Kary Swann PTA  11/10/2022

## 2022-11-10 NOTE — THERAPY EVALUATION
Patient Name: Cesilia Lutz  : 1940    MRN: [id number]                              Today's Date: 11/10/2022       Admit Date: 11/3/2022    Visit Dx:     ICD-10-CM ICD-9-CM   1. Visual hallucination  R44.1 368.16   2. Acute UTI  N39.0 599.0   3. 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   • Impaired glucose tolerance   • Hyperlipidemia   • Abdominal wall hernia   • Nonrheumatic aortic valve stenosis   • Visual hallucination   • Subarachnoid hemorrhage (HCC)   • Metabolic encephalopathy   • Cerebral amyloid angiopathy (HCC)   • Moderate dementia with mood disturbance   • Hypotension     Past Medical History:   Diagnosis Date   • Abdominal wall hernia 2014   • Adenoma of right adrenal gland 2014    2.4cm   • Hyperlipidemia    • Hypertension    • IGT (impaired glucose tolerance)    • PVC (premature ventricular contraction)      Past Surgical History:   Procedure Laterality Date   • ANKLE ARTHROPLASTY Right    • BREAST BIOPSY Left 2019    due to Calcifications- Normal   • CARPAL TUNNEL RELEASE     • CHOLECYSTECTOMY     • COLONOSCOPY N/A 2017    Procedure: COLONOSCOPY TO TRANSVERSE COLON WITH COLD SNARE POLYPECTOMY;  Surgeon: Carlos Payton MD;  Location: [facility] ENDOSCOPY;  Service:    • GANGLION CYST EXCISION Left     x 2   • INCISIONAL HERNIA REPAIR  04/15/2019    Dr Shyla Bartholomew      General Information     Row Name 11/10/22 1306          OT Time and Intention    Document Type evaluation  -RB     Mode of Treatment occupational therapy;co-treatment;physical therapy  -RB     Row Name 11/10/22 1308          General Information    Patient Profile Reviewed yes  -RB     Prior Level of Function independent:;ADL's;transfer  -RB     Existing Precautions/Restrictions fall  -RB     Barriers to Rehab medically complex;cognitive status  -RB     Row Name  11/10/22 1306          Living Environment    People in Home alone  -RB     Row Name 11/10/22 1306          Cognition    Orientation Status (Cognition) oriented to;person;other (see comments)  very confused, visual hallucinations at times  -RB     Row Name 11/10/22 1306          Safety Issues, Functional Mobility    Safety Issues Affecting Function (Mobility) ability to follow commands;awareness of need for assistance;insight into deficits/self-awareness;safety precautions follow-through/compliance;sequencing abilities;safety precaution awareness;problem-solving;judgment  -RB     Impairments Affecting Function (Mobility) balance;cognition;endurance/activity tolerance;coordination;postural/trunk control;range of motion (ROM);strength  -RB           User Key  (r) = Recorded By, (t) = Taken By, (c) = Cosigned By    Initials Name Provider Type    RB Isi Miller OT Occupational Therapist                 Mobility/ADL's     Row Name 11/10/22 1309          Bed Mobility    Bed Mobility supine-sit  -RB     Supine-Sit [place] (Bed Mobility) minimum assist (75% patient effort);verbal cues;nonverbal cues (demo/gesture)  -RB     Assistive Device (Bed Mobility) bed rails  -RB     Comment, (Bed Mobility) many cues for directing the pt  -RB     Row Name 11/10/22 1309          Transfers    Transfers sit-stand transfer;stand-sit transfer;toilet transfer;bed-chair transfer  -RB     Row Name 11/10/22 1309          Bed-Chair Transfer    Bed-Chair [place] (Transfers) minimum assist (75% patient effort);2 person assist;nonverbal cues (demo/gesture);verbal cues  -RB     Assistive Device (Bed-Chair Transfers) walker, front-wheeled  -RB     Row Name 11/10/22 1309          Sit-Stand Transfer    Sit-Stand [place] (Transfers) minimum assist (75% patient effort);2 person assist;nonverbal cues (demo/gesture);verbal cues  -RB     Assistive Device (Sit-Stand Transfers) walker, front-wheeled  -RB     Row Name 11/10/22 1301           Stand-Sit Transfer    Stand-Sit [place] (Transfers) minimum assist (75% patient effort);2 person assist;nonverbal cues (demo/gesture);verbal cues  -RB     Assistive Device (Stand-Sit Transfers) walker, front-wheeled  -RB     Row Name 11/10/22 1309          Toilet Transfer    Type (Toilet Transfer) stand-sit;sit-stand  -RB     [place] Level (Toilet Transfer) minimum assist (75% patient effort);moderate assist (50% patient effort);2 person assist;nonverbal cues (demo/gesture);verbal cues  -RB     Assistive Device (Toilet Transfer) walker, front-wheeled;grab bars/safety frame  -RB     Row Name 11/10/22 1309          Functional Mobility    Functional Mobility- Ind. Level minimum assist (75% patient effort);moderate assist (50% patient effort);2 person assist required;verbal cues required;nonverbal cues required (demo/gesture)  -RB     Functional Mobility- Device walker, front-wheeled  -RB     Functional Mobility- Safety Issues balance decreased during turns  -RB     Functional Mobility- Comment Hands on assist for pt's balance as well as directing her walker  -RB     Row Name 11/10/22 1309          Activities of Daily Living    BADL Assessment/Intervention lower body dressing;toileting  -RB     Row Name 11/10/22 1309          Lower Body Dressing Assessment/Training    [place] Level (Lower Body Dressing) lower body dressing skills;moderate assist (50% patient effort)  -RB     Position (Lower Body Dressing) edge of bed sitting  -RB     Row Name 11/10/22 1309          Toileting Assessment/Training    [place] Level (Toileting) toileting skills;moderate assist (50% patient effort);verbal cues;nonverbal cues (demo/gesture)  -RB     Position (Toileting) supported sitting;supported standing  -           User Key  (r) = Recorded By, (t) = Taken By, (c) = Cosigned By    Initials Name Provider Type    Isi Rodriguez OT Occupational Therapist               Obj/Interventions     Row Name 11/10/22 5679           Sensory Assessment (Somatosensory)    Sensory Assessment (Somatosensory) sensation intact  -RB     Row Name 11/10/22 1311          Vision Assessment/Intervention    Visual Impairment/Limitations WFL  -RB     Row Name 11/10/22 1311          Range of Motion Comprehensive    Comment, General Range of Motion L wrist drop - unsure if chronic or acute. difficulty with L hand digit manipulation  -RB     Row Name 11/10/22 1311          Strength Comprehensive (MMT)    Comment, General Manual Muscle Testing (MMT) Assessment generalized weakness, L wrist drop weakness  -RB     Row Name 11/10/22 1311          Balance    Comment, Balance CGA sitting balance. Min-Mod A standing balance due to impulsivity and impaired coordination/strength  -RB           User Key  (r) = Recorded By, (t) = Taken By, (c) = Cosigned By    Initials Name Provider Type    RB Isi Miller OT Occupational Therapist               Goals/Plan     Row Name 11/10/22 1313          Bed Mobility Goal 1 (OT)    Activity/Assistive Device (Bed Mobility Goal 1, OT) bed mobility activities, all  -RB     [place] Level/Cues Needed (Bed Mobility Goal 1, OT) modified independence  -RB     Time Frame (Bed Mobility Goal 1, OT) short term goal (STG);2 weeks  -RB     Progress/Outcomes (Bed Mobility Goal 1, OT) continuing progress toward goal  -RB     Row Name 11/10/22 1313          Transfer Goal 1 (OT)    Activity/Assistive Device (Transfer Goal 1, OT) transfers, all  -RB     [place] Level/Cues Needed (Transfer Goal 1, OT) modified independence  -RB     Time Frame (Transfer Goal 1, OT) short term goal (STG);2 weeks  -RB     Progress/Outcome (Transfer Goal 1, OT) continuing progress toward goal  -RB     Row Name 11/10/22 1313          Dressing Goal 1 (OT)    Activity/Device (Dressing Goal 1, OT) dressing skills, all  -RB     [place]/Cues Needed (Dressing Goal 1, OT) modified independence  -RB     Time Frame (Dressing Goal 1, OT) short term goal  (STG);2 weeks  -RB     Progress/Outcome (Dressing Goal 1, OT) continuing progress toward goal  -RB     Row Name 11/10/22 1313          Toileting Goal 1 (OT)    Activity/Device (Toileting Goal 1, OT) toileting skills, all  -RB     [place] Level/Cues Needed (Toileting Goal 1, OT) modified independence  -RB     Time Frame (Toileting Goal 1, OT) short term goal (STG);2 weeks  -RB     Progress/Outcome (Toileting Goal 1, OT) continuing progress toward goal  -RB     Row Name 11/10/22 1313          Grooming Goal 1 (OT)    Activity/Device (Grooming Goal 1, OT) grooming skills, all  -RB     [place] (Grooming Goal 1, OT) modified independence  -RB     Time Frame (Grooming Goal 1, OT) short term goal (STG);2 weeks  -RB     Progress/Outcome (Grooming Goal 1, OT) continuing progress toward goal  -RB     Row Name 11/10/22 1313          Therapy Assessment/Plan (OT)    Planned Therapy Interventions (OT) transfer/mobility retraining;strengthening exercise;activity tolerance training;adaptive equipment training;ROM/therapeutic exercise;occupation/activity based interventions;patient/caregiver education/training;cognitive/visual perception retraining;neuromuscular control/coordination retraining;BADL retraining;functional balance retraining  -RB           User Key  (r) = Recorded By, (t) = Taken By, (c) = Cosigned By    Initials Name Provider Type    Isi Rodriguez, OT Occupational Therapist               Clinical Impression     Row Name 11/10/22 1312          Pain Assessment    Pretreatment Pain Rating 0/10 - no pain  -RB     Posttreatment Pain Rating 0/10 - no pain  -RB     Row Name 11/10/22 1312          Plan of Care Review    Plan of Care Reviewed With patient  -RB     Progress no change  -RB     Row Name 11/10/22 1312          Therapy Assessment/Plan (OT)    Rehab Potential (OT) good, to achieve stated therapy goals  -RB     Criteria for Skilled Therapeutic Interventions Met (OT) yes;skilled treatment is necessary   -RB     Therapy Frequency (OT) 5 times/wk  -RB     Row Name 11/10/22 1312          Therapy Plan Review/Discharge Plan (OT)    Anticipated Discharge Disposition (OT) inpatient rehabilitation facility;other (see comments)  -RB     Row Name 11/10/22 1312          Vital Signs    O2 Delivery Pre Treatment room air  -RB     O2 Delivery Intra Treatment room air  -RB     O2 Delivery Post Treatment room air  -RB     Pre Patient Position Supine  -RB     Intra Patient Position Standing  -RB     Post Patient Position Sitting  -RB     Row Name 11/10/22 1312          Positioning and Restraints    Pre-Treatment Position in bed  -RB     Post Treatment Position chair  -RB     In Chair notified nsg;reclined;sitting;call light within reach;encouraged to call for assist;exit alarm on;legs elevated  -RB           User Key  (r) = Recorded By, (t) = Taken By, (c) = Cosigned By    Initials Name Provider Type    Isi Rodriguez, KHADRA Occupational Therapist               Outcome Measures     Row Name 11/10/22 1313          How much help from another is currently needed...    Putting on and taking off regular lower body clothing? 2  -RB     Bathing (including washing, rinsing, and drying) 2  -RB     Toileting (which includes using toilet bed pan or urinal) 2  -RB     Putting on and taking off regular upper body clothing 2  -RB     Taking care of personal grooming (such as brushing teeth) 2  -RB     Eating meals 2  -RB     AM-PAC 6 Clicks Score (OT) 12  -RB     Row Name 11/10/22 1302          How much help from another person do you currently need...    Turning from your back to your side while in flat bed without using bedrails? 3  -SM     Moving from lying on back to sitting on the side of a flat bed without bedrails? 3  -SM     Moving to and from a bed to a chair (including a wheelchair)? 3  -SM     Standing up from a chair using your arms (e.g., wheelchair, bedside chair)? 3  -SM     Climbing 3-5 steps with a railing? 1  -SM     To walk  in hospital room? 2  -SM     AM-PAC 6 Clicks Score (PT) 15  -     Highest level of mobility 4 --> Transferred to chair/commode  -SM     Row Name 11/10/22 1313          Modified [place] Scale    Modified [place] Scale 4 - Moderately severe disability.  Unable to walk without assistance, and unable to attend to own bodily needs without assistance.  -RB     Row Name 11/10/22 1313 11/10/22 1302       Functional Assessment    Outcome Measure Options AM-PAC 6 Clicks Daily Activity (OT);Modified [place]  -RB AM-PAC 6 Clicks Basic Mobility (PT)  -          User Key  (r) = Recorded By, (t) = Taken By, (c) = Cosigned By    Initials Name Provider Type    Kary Herring, PTA Physical Therapist Assistant    Isi Rodriguez, OT Occupational Therapist                Occupational Therapy Education     Title: PT OT SLP Therapies (In Progress)     Topic: Occupational Therapy (Not Started)     Point: ADL training (Not Started)     Description:   Instruct learner(s) on proper safety adaptation and remediation techniques during self care or transfers.   Instruct in proper use of assistive devices.              Learner Progress:  Not documented in this visit.          Point: Home exercise program (Not Started)     Description:   Instruct learner(s) on appropriate technique for monitoring, assisting and/or progressing therapeutic exercises/activities.              Learner Progress:  Not documented in this visit.          Point: Precautions (Not Started)     Description:   Instruct learner(s) on prescribed precautions during self-care and functional transfers.              Learner Progress:  Not documented in this visit.          Point: Body mechanics (Not Started)     Description:   Instruct learner(s) on proper positioning and spine alignment during self-care, functional mobility activities and/or exercises.              Learner Progress:  Not documented in this visit.                          OT Recommendation and  Plan  Planned Therapy Interventions (OT): transfer/mobility retraining, strengthening exercise, activity tolerance training, adaptive equipment training, ROM/therapeutic exercise, occupation/activity based interventions, patient/caregiver education/training, cognitive/visual perception retraining, neuromuscular control/coordination retraining, BADL retraining, functional balance retraining  Therapy Frequency (OT): 5 times/wk  Plan of Care Review  Plan of Care Reviewed With: patient  Progress: no change     Time Calculation:    Time Calculation- OT     Row Name 11/10/22 1314             Time Calculation- OT    OT Start Time 1107  -RB      OT Stop Time 1131  -RB      OT Time Calculation (min) 24 min  -RB      Total Timed Code Minutes- OT 14 minute(s)  -RB      OT Received On 11/10/22  -RB      OT - Next Appointment 11/11/22  -RB      OT Goal Re-Cert Due Date 11/24/22  -RB         Timed Charges    92834 - OT Self Care/Mgmt Minutes 14  -RB         Untimed Charges    OT Eval/Re-eval Minutes 10  -RB         Total Minutes    Timed Charges Total Minutes 14  -RB      Untimed Charges Total Minutes 10  -RB       Total Minutes 24  -RB            User Key  (r) = Recorded By, (t) = Taken By, (c) = Cosigned By    Initials Name Provider Type    RB Isi Miller OT Occupational Therapist              Therapy Charges for Today     Code Description Service Date Service Provider Modifiers Qty    13149403866 HC OT SELF CARE/MGMT/TRAIN EA 15 MIN 11/10/2022 Isi Miller OT GO 1    32617543548 HC OT EVAL MOD COMPLEXITY 3 11/10/2022 Isi Miller OT GO 1               Isi Miller OT  11/10/2022

## 2022-11-10 NOTE — PLAN OF CARE
Goal Outcome Evaluation:  Plan of Care Reviewed With: patient        Progress: improving  Outcome Evaluation: Pt tolerated treatment well this date. Increased gait distance to 60ft w/ Rw and min A x2, though required mod A to correct 1 LOb when entering the bathroom. Pt demonstrating shuffled gait pattern, and at one point attmepted to wrap leg around the walker. Several cues for safety throughout, and cues to stay on task. Memory and attention deficits limiting at times. Would recommend acute rehab upon d/c.

## 2022-11-10 NOTE — PROGRESS NOTES
Continued Stay Note  [facility]     Patient Name: Cesilia Lutz  MRN: [id number]  Today's Date: 11/10/2022    Admit Date: 11/3/2022    Plan: [facility] skilled pending [place] Medicare pre-cert   Discharge Plan     Row Name 11/10/22 1505       Plan    Plan [facility] skilled pending [place] Medicare pre-cert    Patient/Family in Agreement with Plan yes    Plan Comments Per [facility] can accept pt pending [place] Medicare pre-cert, initiated today as pt remains restraint free since 11/9 at 0824 AM.  left for both daughters to update per request. Pharmacy updated and packet in CCP office. Yani Marrufo LCSW               Discharge Codes    No documentation.               Expected Discharge Date and Time     Expected Discharge Date Expected Discharge Time    Nov 11, 2022             Renee Marrufo LCSW

## 2022-11-11 NOTE — PLAN OF CARE
Problem: Adult Inpatient Plan of Care  Goal: Plan of Care Review  Outcome: Met  Goal: Patient-Specific Goal (Individualized)  Outcome: Met  Goal: Absence of Hospital-Acquired Illness or Injury  Outcome: Met  Goal: Optimal Comfort and Wellbeing  Outcome: Met  Goal: Readiness for Transition of Care  Outcome: Met     Problem: Fall Injury Risk  Goal: Absence of Fall and Fall-Related Injury  Outcome: Met     Problem: Hypertension Comorbidity  Goal: Blood Pressure in Desired Range  Outcome: Met     Problem: Skin Injury Risk Increased  Goal: Skin Health and Integrity  Outcome: Met   Goal Outcome Evaluation:

## 2022-11-11 NOTE — PROGRESS NOTES
The patient is out of restraints when seen today.  We have started low-dose scheduled haloperidol and the patient slept last evening.  She is in no restraint devices when seen today and charting indicates possible discharge later today.  I would recommend continuation of scheduled low-dose Haldol.

## 2022-11-11 NOTE — PROGRESS NOTES
Continued Stay Note  [facility]     Patient Name: Cesilia Lutz  MRN: [id number]  Today's Date: 11/11/2022    Admit Date: 11/3/2022    Plan: [facility] skilled, [place] Medicare pre-cert approved   Discharge Plan     Row Name 11/11/22 1120       Plan    Plan [facility] skilled, [place] Medicare pre-cert approved    Patient/Family in Agreement with Plan yes    Plan Comments Per [facility] has obtained [place] Medicare pre-cert and can accept pt today. No covid swab needed. Pharmacy updated. Daughter (Sindhu Lutz, [phone]) updated. [place] stretcher scheduled at 4:00 PM (ID: [id number]). Yani Marrufo LCSW               Discharge Codes    No documentation.               Expected Discharge Date and Time     Expected Discharge Date Expected Discharge Time    Nov 11, 2022             Renee Marrufo LCSW

## 2022-11-11 NOTE — DISCHARGE SUMMARY
Date of Admission: 11/3/2022  Date of Discharge:  11/11/2022    Discharge Diagnosis:    Subarachnoid hemorrhage (HCC)    Hypertension    Impaired glucose tolerance    Hyperlipidemia    Nonrheumatic aortic valve stenosis    Visual hallucination    Metabolic encephalopathy    Cerebral amyloid angiopathy (HCC)    Moderate dementia with mood disturbance    Hypotension    Stroke    Consults:   Consults     Date and Time Order Name Status Description    11/8/2022  2:30 PM Inpatient Psychiatrist Consult Completed     11/3/2022  8:09 PM Inpatient Neurology Consult General Completed     11/3/2022  7:32 PM Family Medicine Consult              DETAILS OF HOSPITAL STAY     Hospital Course  Patient is a 82 y.o. female presented with confusion and visual hallucinations.  A small subarachnoid hemorrhage was noted on the CT scan in the emergency room.  There was initial concern about urinary tract infection that was not felt to be the case with negative cultures.  An MRI of the brain later revealed several small strokes including the left parietal lobe, left frontal lobe anteriorly, right occipital lobe posterior lateral felt to be compatible with embolic phenomenon given there are different vascular distributions.  A TTE was unremarkable.  A 48-hour Holter was pending as of this dictation.  Irregardless the patient is not currently a candidate for anticoagulation given her subarachnoid hemorrhage.  There is evidence of amyloid angiopathy in the brain which also would increase her brain bleeding risk.  She likely has underlying dementia related to the amyloid angiopathy and this has been noticed prior to the hospitalization.  She had quite a bit of confusion and hallucinations during the hospital stay.  The hallucinations gradually abated with addition of Haldol.  At the time of discharge she was ambulating pretty well with physical therapy and transferred to subacute rehab to continue to try to rehabilitate and get her stronger.   Lab visit scheduled for 9/22  Neena Wilcox RN     Donepezil 5 mg daily was added to her schedule along with aspirin 81 mg daily.  In 1 month we should repeat a CT of the brain and decide on whether she is a candidate for anticoagulation of any atrial arrhythmias are found on her cardiac monitoring.  Given the likelihood of underlying dementia I would be in favor of not considering anticoagulation even if atrial fibrillation is seen.  She was had a no CODE STATUS during the hospital stay after discussion with the family which was quite reasonable.  She was seen by both neurology and psychiatry during the hospital stay.  Bilateral carotid ultrasound showed mild disease only bilaterally.      Discharge Medications     Discharge Medications      New Medications      Instructions Start Date   aspirin 81 MG chewable tablet   81 mg, Oral, Daily   Start Date: November 12, 2022     donepezil 5 MG tablet  Commonly known as: ARICEPT   5 mg, Oral, Nightly      haloperidol 2 MG tablet  Commonly known as: HALDOL   2 mg, Oral, Nightly      haloperidol 1 MG tablet  Commonly known as: HALDOL   1 mg, Oral, Every 8 Hours Scheduled         Continue These Medications      Instructions Start Date   metoprolol succinate XL 50 MG 24 hr tablet  Commonly known as: TOPROL-XL   Take 1 tablet by mouth once daily      potassium chloride 10 MEQ CR tablet  Commonly known as: K-DUR,KLOR-CON   Take 2 tablets by mouth once daily      rosuvastatin 20 MG tablet  Commonly known as: CRESTOR   Take 1 tablet by mouth once daily      triamterene-hydrochlorothiazide 37.5-25 MG per capsule  Commonly known as: DYAZIDE   Take 1 capsule by mouth once daily in the morning      VITAMIN B 12 PO   1 tablet, Oral, Daily         Stop These Medications    estradiol 0.25 MG/0.25GM gel     medroxyPROGESTERone 2.5 MG tablet  Commonly known as: PROVERA              Pertinent Test Results: CT of brain without contrast, MRI of brain without contrast, carotid ultrasound duplex, TTE    Condition on Discharge: Stable and only  "slightly improved      Vital Signs  Temp:  [98 °F (36.7 °C)-98.3 °F (36.8 °C)] 98.1 °F (36.7 °C)  Heart Rate:  [] 101  Resp:  [16-18] 16  BP: ()/(58-76) 104/65    Flowsheet Rows    Flowsheet Row First Filed Value   Admission Height 149.9 cm (59\") Documented at 11/03/2022 2121   Admission Weight 52 kg (114 lb 10.2 oz) Documented at 11/03/2022 2121            Discharge Disposition  Home or Self Care      Discharge Diet:   Regular    Activity at Discharge:   Up with assistance only    Follow-up Appointments  Future Appointments   Date Time Provider Department Center   11/15/2022  1:15 PM Quinn Cabrera MD [id number]   2/15/2023 11:00 AM Ching Abdul APRN [facility]         Test Results Pending at Discharge  48-hour Holter     Greater than 30 minutes spent coordinating patient's discharge.    Quinn Cabrera MD  11/11/22  12:18 EST    Dragon disclaimer:   Much of this encounter note is an electronic transcription/translation of spoken language to printed text. The electronic translation of spoken language may permit erroneous, or at times, nonsensical words or phrases to be inadvertently transcribed; Although I have reviewed the note for such errors, some may still exist.       "

## 2022-11-11 NOTE — PLAN OF CARE
Goal Outcome Evaluation:      VSS. Patient hypotensive at times. Patient slept better tonight. No other significant events overnight.              Problem: Adult Inpatient Plan of Care  Goal: Plan of Care Review  Outcome: Ongoing, Progressing  Goal: Patient-Specific Goal (Individualized)  Outcome: Ongoing, Progressing  Goal: Absence of Hospital-Acquired Illness or Injury  Outcome: Ongoing, Progressing  Intervention: Identify and Manage Fall Risk  Recent Flowsheet Documentation  Taken 11/11/2022 0400 by Annie Murcia RN  Safety Promotion/Fall Prevention: safety round/check completed  Taken 11/11/2022 0230 by Annie Murcia RN  Safety Promotion/Fall Prevention: safety round/check completed  Taken 11/11/2022 0000 by Annie Murcia RN  Safety Promotion/Fall Prevention: safety round/check completed  Taken 11/10/2022 2233 by Annie Murcia RN  Safety Promotion/Fall Prevention: safety round/check completed  Taken 11/10/2022 2130 by Annie Murcia RN  Safety Promotion/Fall Prevention:   activity supervised   assistive device/personal items within reach   fall prevention program maintained   nonskid shoes/slippers when out of bed   safety round/check completed  Taken 11/10/2022 2000 by Annie Murcia RN  Safety Promotion/Fall Prevention: safety round/check completed  Intervention: Prevent Skin Injury  Recent Flowsheet Documentation  Taken 11/11/2022 0400 by Annie Murcia RN  Body Position:   position changed independently   tilted   left  Taken 11/11/2022 0230 by Annie Murcia RN  Body Position:   position changed independently   weight shifting  Taken 11/11/2022 0000 by Annie Murcia RN  Body Position:   tilted   left  Taken 11/10/2022 2233 by Annie Murcia RN  Body Position:   tilted   right  Taken 11/10/2022 2130 by Annie Murcia RN  Body Position: weight shifting  Taken 11/10/2022 2000 by Annie Murcia RN  Body Position: supine, legs elevated  Intervention: Prevent and Manage VTE (Venous  Thromboembolism) Risk  Recent Flowsheet Documentation  Taken 11/10/2022 2130 by Annie Murcia RN  Activity Management: activity adjusted per tolerance  VTE Prevention/Management:   bilateral   sequential compression devices on  Goal: Optimal Comfort and Wellbeing  Outcome: Ongoing, Progressing  Intervention: Monitor Pain and Promote Comfort  Recent Flowsheet Documentation  Taken 11/11/2022 0000 by Annie Murcia RN  Pain Management Interventions: see MAR  Goal: Readiness for Transition of Care  Outcome: Ongoing, Progressing     Problem: Fall Injury Risk  Goal: Absence of Fall and Fall-Related Injury  Outcome: Ongoing, Progressing  Intervention: Identify and Manage Contributors  Recent Flowsheet Documentation  Taken 11/10/2022 2130 by Annie Murcia RN  Medication Review/Management: medications reviewed  Intervention: Promote Injury-Free Environment  Recent Flowsheet Documentation  Taken 11/11/2022 0400 by Annie Murcia RN  Safety Promotion/Fall Prevention: safety round/check completed  Taken 11/11/2022 0230 by Annie Murcia RN  Safety Promotion/Fall Prevention: safety round/check completed  Taken 11/11/2022 0000 by Annie Murcia RN  Safety Promotion/Fall Prevention: safety round/check completed  Taken 11/10/2022 2233 by Annie Murcia RN  Safety Promotion/Fall Prevention: safety round/check completed  Taken 11/10/2022 2130 by Annie Murcia RN  Safety Promotion/Fall Prevention:   activity supervised   assistive device/personal items within reach   fall prevention program maintained   nonskid shoes/slippers when out of bed   safety round/check completed  Taken 11/10/2022 2000 by Annie Murcia RN  Safety Promotion/Fall Prevention: safety round/check completed     Problem: Hypertension Comorbidity  Goal: Blood Pressure in Desired Range  Outcome: Ongoing, Progressing  Intervention: Maintain Blood Pressure Management  Recent Flowsheet Documentation  Taken 11/10/2022 2130 by Annie Murcia  RN  Medication Review/Management: medications reviewed     Problem: Skin Injury Risk Increased  Goal: Skin Health and Integrity  Outcome: Ongoing, Progressing  Intervention: Optimize Skin Protection  Recent Flowsheet Documentation  Taken 11/11/2022 0400 by Annie Murcia RN  Head of Bed ([facility]) Positioning: HOB at 15 degrees  Taken 11/11/2022 0230 by Annie Murcia RN  Head of Bed (HOB) Positioning: HOB at 20 degrees  Taken 11/11/2022 0000 by Annie Murcia RN  Head of Bed (HOB) Positioning: HOB at 20 degrees  Taken 11/10/2022 2233 by Annie Murcia RN  Head of Bed ([facility]) Positioning: HOB at 20 degrees  Taken 11/10/2022 2130 by Annie Murcia RN  Pressure Reduction Techniques:   frequent weight shift encouraged   weight shift assistance provided  Head of Bed (HOB) Positioning: HOB at 20 degrees  Pressure Reduction Devices: alternating pressure pump (ADD)  Taken 11/10/2022 2000 by Annie Murcia RN  Head of Bed ([facility]) Positioning: HOB at 20 degrees

## 2022-11-11 NOTE — PROGRESS NOTES
"   LOS: 4 days    Patient Care Team:  Quinn Cabrera MD as PCP - General (Internal Medicine)  Quinn Cabrera MD as PCP - Internal Medicine (Internal Medicine)  Xiomara Díaz MD as Consulting Physician (Obstetrics and Gynecology)    Chief Complaint:    Chief Complaint   Patient presents with   • Altered Mental Status     Subjective     Interval History:     Patient Complaints: She is calmer today pleasant and cooperative.  Recognizes me and calls me by name.  Not hallucinating at present.  Clearly much better this morning and the best I have seen so far at this hospitalization.     Objective     Vital Signs  Temp:  [98 °F (36.7 °C)-98.3 °F (36.8 °C)] 98.1 °F (36.7 °C)  Heart Rate:  [] 101  Resp:  [16-18] 16  BP: ()/(58-76) 104/65    Flowsheet Rows    Flowsheet Row First Filed Value   Admission Height 149.9 cm (59\") Documented at 11/03/2022 2121   Admission Weight 52 kg (114 lb 10.2 oz) Documented at 11/03/2022 2121          No intake/output data recorded.  No intake/output data recorded.  No intake or output data in the 24 hours ending 11/11/22 0940    Physical Exam:   General Appearance:    Alert, and awake but very confused, in no acute distress, out of restraints       Eyes:            Conjunctivae and sclerae normal, no   icterus, PERRLA   Neck:          Lungs:     Clear to auscultation,respirations regular, even and                  unlabored    Heart:    Regular rhythm and normal rate, normal S1 and S2, no            murmur, no gallop, no rub, no click       Abdomen:     Normal bowel sounds, no masses, no organomegaly, soft        non-tender, non-distended, no guarding, no rebound                tenderness       Extremities:    Pulses:        Lymph nodes:          Results Review:    I reviewed the patient's new clinical results.  Results from last 7 days   Lab Units 11/07/22  0531 11/06/22  0724   SODIUM mmol/L 143 141   POTASSIUM mmol/L 3.5 3.5   CHLORIDE mmol/L 106 106   CO2 mmol/L 20.6* " 19.0*   BUN mg/dL 28* 29*   CREATININE mg/dL 0.96 0.96   CALCIUM mg/dL 9.7 9.6   BILIRUBIN mg/dL 1.2 0.7   ALK PHOS U/L 87 89   ALT (SGPT) U/L 19 18   AST (SGOT) U/L 35* 36*   GLUCOSE mg/dL 124* 107*       Estimated Creatinine Clearance: 36.2 mL/min (by C-G formula based on SCr of 0.96 mg/dL).    Results from last 7 days   Lab Units 11/06/22  0724   MAGNESIUM mg/dL 2.0             Results from last 7 days   Lab Units 11/07/22  0531 11/06/22  0724   WBC 10*3/mm3 9.37 9.85   HEMOGLOBIN g/dL 13.6 13.6   PLATELETS 10*3/mm3 286 294               Imaging Results (Last 24 Hours)     ** No results found for the last 24 hours. **        aspirin, 81 mg, Oral, Daily  donepezil, 5 mg, Oral, Nightly  haloperidol, 1 mg, Oral, Q8H  haloperidol, 2 mg, Oral, Nightly  metoprolol tartrate, 25 mg, Oral, Q12H  potassium chloride, 20 mEq, Oral, Daily  rosuvastatin, 20 mg, Oral, Nightly  sodium chloride, 10 mL, Intravenous, Q12H  sodium chloride, 10 mL, Intravenous, Q12H  vitamin B-12, 1,000 mcg, Oral, Daily           Medication Review:   Current Facility-Administered Medications   Medication Dose Route Frequency Provider Last Rate Last Admin   • acetaminophen (TYLENOL) tablet 650 mg  650 mg Oral Q4H PRN Quinn Cabrera MD   650 mg at 11/10/22 2358   • aspirin chewable tablet 81 mg  81 mg Oral Daily Ching Abdul APRN   81 mg at 11/11/22 0934   • donepezil (ARICEPT) tablet 5 mg  5 mg Oral Nightly Arnulfo Bennett III, MD   5 mg at 11/10/22 2358   • haloperidol (HALDOL) tablet 1 mg  1 mg Oral Q8H Arnulfo Bennett III, MD       • haloperidol (HALDOL) tablet 2 mg  2 mg Oral Nightly Arnulfo Bennett III, MD   2 mg at 11/10/22 2358   • haloperidol lactate (HALDOL) injection 2 mg  2 mg Intramuscular Q6H PRN Arnulfo Bennett III, MD       • melatonin tablet 5 mg  5 mg Oral Nightly PRN Quinn Cabrera MD   5 mg at 11/06/22 2048   • metoprolol tartrate (LOPRESSOR) tablet 25 mg  25 mg Oral Q12H Deepak,  Caden LIMON MD   25 mg at 11/11/22 0934   • potassium chloride (K-DUR,KLOR-CON) ER tablet 20 mEq  20 mEq Oral Daily Quinn Cabrera MD   20 mEq at 11/11/22 0934   • rosuvastatin (CRESTOR) tablet 20 mg  20 mg Oral Nightly Quinn Cabrera MD   20 mg at 11/10/22 2358   • sodium chloride 0.9 % flush 10 mL  10 mL Intravenous PRN Davon Winston MD       • sodium chloride 0.9 % flush 10 mL  10 mL Intravenous Q12H Quinn Cabrera MD   10 mL at 11/08/22 0949   • sodium chloride 0.9 % flush 10 mL  10 mL Intravenous PRN Quinn Cabrera MD       • sodium chloride 0.9 % flush 10 mL  10 mL Intravenous Q12H Ching Abdul APRN       • sodium chloride 0.9 % flush 10 mL  10 mL Intravenous PRN Ching Abdul APRN       • vitamin B-12 (CYANOCOBALAMIN) tablet 1,000 mcg  1,000 mcg Oral Daily Ching Abdul APRN   1,000 mcg at 11/11/22 0934       Assessment & Plan       Subarachnoid hemorrhage (HCC)    Hypertension    Impaired glucose tolerance    Hyperlipidemia    Nonrheumatic aortic valve stenosis    Visual hallucination    Metabolic encephalopathy    Cerebral amyloid angiopathy (HCC)    Moderate dementia with mood disturbance    Hypotension    Dementia likely related to cerebral amyloid angiopathy.  Tiny subarachnoid hemorrhage related to this process.  MRI of brain that demonstrated several small cortical infarcts in different circulation beds raising the possibility of embolic source.  A TTE demonstrated mild aortic stenosis with a peak gradient of 33 mm and a mean gradient of 18 mm along with a EDUARDO of 0.9 cm.  No arrhythmias noted on the monitor.  Long-term anticoagulation would be problematic given the diagnosis of cerebral amyloid angiopathy as well as the current subarachnoid hemorrhage.  She has scheduled Haldol at bedtime.  She is going to require placement at discharge.  Has been out of restraints for 2 days now.  Could be discharged at any time.  It looks like precertification is pending at [facility].      PPE  today included face mask..    Quinn Cabrera MD  11/11/22  09:40 EST

## 2022-11-12 NOTE — OUTREACH NOTE
Prep Survey    Flowsheet Row Responses   Spiritism facility patient discharged from? [facility]   Is LACE score < 7 ? No   Emergency Room discharge w/ pulse ox? No   Eligibility Not Eligible   What are the reasons patient is not eligible? [facility]   Does the patient have one of the following disease processes/diagnoses(primary or secondary)? Other   Prep survey completed? Yes          JUANA Pruitt Registered Nurse

## 2022-12-02 NOTE — PROGRESS NOTES
December 2, 2022    Hello, may I speak with Cesilia Lutz?    My name is Salina     I am  with DO CABRERA  [facility]  [facility] [address] [address] [address] [phone].    Before we get started may I verify your date of birth? 1940    I am calling to officially welcome you to our practice and ask about your recent visit. Is this a good time to talk? yes    Tell me about your visit with us. What things went well?  My visit was good, Dr. Cabrera is the best.       We're always looking for ways to make our patients' experiences even better. Do you have recommendations on ways we may improve?  no    Overall were you satisfied with your first visit to our practice? yes       I appreciate you taking the time to speak with me today. Is there anything else I can do for you? no      Thank you, and have a great day.

## 2024-03-25 NOTE — PLAN OF CARE
Problem: Restraint, Nonbehavioral (Nonviolent)  Goal: Absence of Harm or Injury  Outcome: Ongoing, Progressing  Intervention: Implement Least Restrictive Safety Strategies  Recent Flowsheet Documentation  Taken 11/8/2022 1800 by Kath Kirkpatrick RN  Medical Device Protection: IV pole/bag removed from visual field  Taken 11/8/2022 1616 by Kath Kirkpatrick RN  Medical Device Protection: IV pole/bag removed from visual field  Less Restrictive Alternative:   environment adjusted   sensory stimulation limited  De-Escalation Techniques:   appropriate behavior reinforced   quiet time facilitated   reoriented   stimulation decreased  Diversional Activities: television  Taken 11/8/2022 1400 by Kath Kirkpatrick RN  Medical Device Protection: IV pole/bag removed from visual field  Less Restrictive Alternative:   environment adjusted   sensory stimulation limited  De-Escalation Techniques:   appropriate behavior reinforced   quiet time facilitated   reoriented   stimulation decreased  Diversional Activities: television  Taken 11/8/2022 1336 by Kath Kirkpatrick RN  Medical Device Protection: IV pole/bag removed from visual field  Less Restrictive Alternative:   environment adjusted   sensory stimulation limited  De-Escalation Techniques:   quiet time facilitated   stimulation decreased   reoriented  Diversional Activities: television  Taken 11/8/2022 1200 by Kath Kirkpatrick RN  Medical Device Protection: IV pole/bag removed from visual field  Less Restrictive Alternative:   environment adjusted   sensory stimulation limited  De-Escalation Techniques:   appropriate behavior reinforced   quiet time facilitated   reoriented   stimulation decreased  Diversional Activities: television  Taken 11/8/2022 1000 by Kath Kirkpatrick RN  Medical Device Protection: (pt off floor) other (see comments)  Less Restrictive Alternative: (pt off floor) other (see comments)  De-Escalation Techniques: (pt off floor)  From: Zoraida Rodriguez  To: Richard Umanzor  Sent: 3/25/2024 12:46 PM CDT  Subject: Asap    This is Zoraida's mom Olena. I NEED a nurse to call me back regarding Zoraida's high blood pressure and headache! My phone number is [phone], thank you   other (see comments)  Diversional Activities: (pt off floor) other (see comments)  Taken 11/8/2022 0932 by Kath Kirkpatrick RN  Diversional Activities: television  Taken 11/8/2022 0800 by Kath Kirkpatrick RN  Medical Device Protection:   IV pole/bag removed from visual field   tubing secured  Less Restrictive Alternative:   bed alarm in use   calming techniques promoted   environment adjusted   medication offered   sensory stimulation limited  De-Escalation Techniques:   quiet time facilitated   reoriented   stimulation decreased  Diversional Activities: television  Intervention: Protect Dignity, Rights, and Personal Wellbeing  Recent Flowsheet Documentation  Taken 11/8/2022 1400 by Kath Kirkpatrick RN  Trust Relationship/Rapport:   care explained   choices provided   questions answered   questions encouraged   thoughts/feelings acknowledged  Taken 11/8/2022 0932 by Kath Kirkpatrick RN  Trust Relationship/Rapport:   care explained   choices provided   questions answered   questions encouraged   thoughts/feelings acknowledged  Intervention: Protect Skin and Joint Integrity  Recent Flowsheet Documentation  Taken 11/8/2022 1800 by Kath Kirkpatrick RN  Body Position: supine  Taken 11/8/2022 1616 by Kath Kirkpatrick RN  Body Position: weight shifting  Taken 11/8/2022 1400 by Kath Kirkpatrick RN  Body Position:   supine   weight shifting  Range of Motion: active ROM (range of motion) encouraged  Taken 11/8/2022 1200 by Kath Kirkpatrick RN  Body Position: supine  Taken 11/8/2022 0932 by Kath Kirkpatrick RN  Body Position: supine  Range of Motion: active ROM (range of motion) encouraged  Taken 11/8/2022 0800 by Kath Kirkpatrick RN  Body Position: supine   Goal Outcome Evaluation: